# Patient Record
Sex: FEMALE | Race: WHITE | HISPANIC OR LATINO | Employment: PART TIME | URBAN - METROPOLITAN AREA
[De-identification: names, ages, dates, MRNs, and addresses within clinical notes are randomized per-mention and may not be internally consistent; named-entity substitution may affect disease eponyms.]

---
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
## 2018-01-26 DIAGNOSIS — G43.909 MIGRAINE WITHOUT STATUS MIGRAINOSUS, NOT INTRACTABLE, UNSPECIFIED MIGRAINE TYPE: ICD-10-CM

## 2018-01-26 NOTE — TELEPHONE ENCOUNTER
"Requested Prescriptions   Pending Prescriptions Disp Refills     amitriptyline (ELAVIL) 25 MG tablet [Pharmacy Med Name: AMITRIPTYLINE HCL 25 MG TAB] 90 tablet 1    Last Written Prescription Date:  06/09/2017  Last Fill Quantity: 90 TABLET,  # refills: 1   Last Office Visit with 06/09/2017:     Future Office Visit:      Sig: TAKE 1 TABLET (25 MG) BY MOUTH AT BEDTIME    Tricyclic Antidepressants Protocol Failed    1/26/2018  1:13 AM       Failed - Blood pressure under 140/90    BP Readings from Last 3 Encounters:   12/29/17 155/88   06/09/17 120/80   05/15/17 106/70                Passed - Recent (12 mo) or future (30 d) visit with authorizing provider's specialty     Patient had office visit in the last year or has a visit in the next 30 days with authorizing provider.  See \"Patient Info\" tab in inbasket, or \"Choose Columns\" in Meds & Orders section of the refill encounter.            Passed - Patient is age 18 or older       Passed - No active pregnancy on record       Passed - No positive pregnancy test in past 12 months          Americo RUEDAT  "

## 2018-01-29 NOTE — TELEPHONE ENCOUNTER
Follow up visit in 6 months, sooner as needed., one month sent with appointment message  Prescription approved per G Refill Protocol.  Pia Santiago RN, BSN

## 2018-02-02 ENCOUNTER — APPOINTMENT (OUTPATIENT)
Dept: GENERAL RADIOLOGY | Facility: CLINIC | Age: 18
End: 2018-02-02
Attending: EMERGENCY MEDICINE
Payer: COMMERCIAL

## 2018-02-02 ENCOUNTER — HOSPITAL ENCOUNTER (EMERGENCY)
Facility: CLINIC | Age: 18
Discharge: HOME | End: 2018-02-02
Attending: EMERGENCY MEDICINE | Admitting: EMERGENCY MEDICINE
Payer: COMMERCIAL

## 2018-02-02 ENCOUNTER — APPOINTMENT (OUTPATIENT)
Dept: ULTRASOUND IMAGING | Facility: CLINIC | Age: 18
End: 2018-02-02
Attending: EMERGENCY MEDICINE
Payer: COMMERCIAL

## 2018-02-02 VITALS
BODY MASS INDEX: 34.36 KG/M2 | RESPIRATION RATE: 20 BRPM | WEIGHT: 219.36 LBS | TEMPERATURE: 97.2 F | SYSTOLIC BLOOD PRESSURE: 134 MMHG | OXYGEN SATURATION: 100 % | DIASTOLIC BLOOD PRESSURE: 86 MMHG

## 2018-02-02 DIAGNOSIS — R07.89 CHEST WALL PAIN: ICD-10-CM

## 2018-02-02 DIAGNOSIS — R10.11 ABDOMINAL PAIN, RIGHT UPPER QUADRANT: ICD-10-CM

## 2018-02-02 LAB
ANION GAP SERPL CALCULATED.3IONS-SCNC: 6 MMOL/L (ref 3–14)
BUN SERPL-MCNC: 14 MG/DL (ref 7–30)
CALCIUM SERPL-MCNC: 8.8 MG/DL (ref 8.5–10.1)
CHLORIDE SERPL-SCNC: 107 MMOL/L (ref 96–110)
CO2 SERPL-SCNC: 26 MMOL/L (ref 20–32)
CREAT SERPL-MCNC: 0.62 MG/DL (ref 0.5–1)
ERYTHROCYTE [DISTWIDTH] IN BLOOD BY AUTOMATED COUNT: 12.4 % (ref 10–15)
GFR SERPL CREATININE-BSD FRML MDRD: >90 ML/MIN/1.7M2
GLUCOSE SERPL-MCNC: 113 MG/DL (ref 70–99)
HCG UR QL: NEGATIVE
HCT VFR BLD AUTO: 43.7 % (ref 35–47)
HGB BLD-MCNC: 14.8 G/DL (ref 11.7–15.7)
MCH RBC QN AUTO: 29.2 PG (ref 26.5–33)
MCHC RBC AUTO-ENTMCNC: 33.9 G/DL (ref 31.5–36.5)
MCV RBC AUTO: 86 FL (ref 78–100)
PLATELET # BLD AUTO: 291 10E9/L (ref 150–450)
POTASSIUM SERPL-SCNC: 3.6 MMOL/L (ref 3.4–5.3)
RBC # BLD AUTO: 5.06 10E12/L (ref 3.8–5.2)
SODIUM SERPL-SCNC: 139 MMOL/L (ref 133–144)
WBC # BLD AUTO: 8.8 10E9/L (ref 4–11)

## 2018-02-02 PROCEDURE — 93976 VASCULAR STUDY: CPT | Mod: XS

## 2018-02-02 PROCEDURE — 71046 X-RAY EXAM CHEST 2 VIEWS: CPT

## 2018-02-02 PROCEDURE — 85027 COMPLETE CBC AUTOMATED: CPT | Performed by: EMERGENCY MEDICINE

## 2018-02-02 PROCEDURE — 80048 BASIC METABOLIC PNL TOTAL CA: CPT | Performed by: EMERGENCY MEDICINE

## 2018-02-02 PROCEDURE — 81025 URINE PREGNANCY TEST: CPT | Performed by: EMERGENCY MEDICINE

## 2018-02-02 PROCEDURE — 99285 EMERGENCY DEPT VISIT HI MDM: CPT | Mod: 25

## 2018-02-02 ASSESSMENT — ENCOUNTER SYMPTOMS
HEMATURIA: 0
FEVER: 0
NUMBNESS: 1
ABDOMINAL PAIN: 1
DYSURIA: 0
COUGH: 1
WEAKNESS: 1
FREQUENCY: 0

## 2018-02-02 NOTE — ED NOTES
Patient alert and oriented times 3 .  Abc intact cough for 2 days now pain in right upper abdomen started at 1530. Ate at 2300 no change in pain

## 2018-02-02 NOTE — ED PROVIDER NOTES
History     Chief Complaint:  Abdominal Pain    HPI   Kailey Argueta is a 17 year old female smoker who presents to the ED for evaluation of abdominal pain. The patient reports that she has had a cough for the past 2 days. This afternoon at 1530, she also developed a sharp, intermittent abdominal pain localized to her RUQ under her rib cage, that she felt was initiated by her cough.  She has had 2 episodes of this pain, the second around 2300, both 10 minutes in duration. She also ate around 2300, and notes no change in her pain at that time. She denies any fevers, dysuria, hematuria, or frequency. Additionally, she denies any history of abdominal surgery. She notes that she had a similar pain in the past on the other side, and it was an ovarian cyst. She has taken 2 ibuprofen for the pain.  No traumatic injuries.      PE/DVT Risk Factors:  The patient denies any personal or familial history of PE, DVT, or clotting disorder. The patient reports no recent travel, surgery, or other immobilizations. She denies any birth control use.    Allergies:  NKDA    Medications:    Elavil  Lamotrigine  Voltaren  Vyvanse    Past Medical History:    ADHD  Migraines  Panic attack    Past Surgical History:    The patient does not have any pertinent past surgical history.    Family History:    No past pertinent family history.    Social History:  Marital Status:  Single [1]  Current Smoker  Negative for alcohol use.    Review of Systems   Constitutional: Negative for fever.   Respiratory: Positive for cough.    Gastrointestinal: Positive for abdominal pain.   Genitourinary: Negative for dysuria, frequency and hematuria.   Neurological: Positive for weakness and numbness.   All other systems reviewed and are negative.    Physical Exam     Patient Vitals for the past 24 hrs:   BP Temp Temp src Heart Rate Resp SpO2 Weight   02/02/18 0026 140/80 97.2  F (36.2  C) Temporal 96 20 100 % 99.5 kg (219 lb 5.7 oz)     Physical Exam  General:                         Well-nourished                        Speaking in full sentences                        Well appearing  Eyes:                        Conjunctiva without injection or scleral icterus                        PERRL  ENT:                        Moist mucous membranes                        Posterior oropharynx clear without erythema or exudate                        Nares patent                        Pinnae normal  Neck:                        Full ROM                        No stiffness appreciated  Resp:                        Lungs CTAB                        No crackles, wheezing or audible rubs                        Good air movement  CV:                                        Normal rate, regular rhythm                        S1 and S2 present                        No murmur, gallop or rub                        Mild tenderness to deep palpation over lower right thoracic margin  GI:                        BS present                        Abdomen soft without distention                        No focal RLQ pain or LLQ pain                        No guarding or rebound tenderness  Skin:                        Warm, dry, well perfused                        No rashes or open wounds on exposed skin  MSK:                        Moves all extremities                        No focal deformities or swelling  Neuro:                        Alert                        Answers questions appropriately                        Moves all extremities equally                        Gait stable  Psych:                        Normal affect, normal mood     PERC Rule for risk stratifying PE to low risk (calculator)  Background  Risk stratifies patients to low risk of PE if all 8 criteria are present including age <50, heart rate <100, O2 Sat >94%, no unilateral leg edema, no hemoptysis, no recent surgery or trauma, no prior VTE, and no hormone use.  Data  17 year old  has CARDIOVASCULAR SCREENING; LDL GOAL LESS  THAN 130; Panic attack; and Migraine without status migrainosus, not intractable, unspecified migraine type on her problem list.  SpO2: 100 %  Criteria                        Of  8 possible items (all criteria must be present):  Age <50 years  Heart rate <100 bpm  Oxygen Saturation >94%  No unilateral leg swelling  No hemoptysis  No surgery or trauma within 4 weeks  No prior DVT or PE  No hormone use (oral, transdermal and intravaginal estrogens)  Interpretation  All eight criteria are met AND low clinical PE suspicion: No further evaluation for PE required    Emergency Department Course     Imaging:  Radiographic findings were communicated with the patient who voiced understanding of the findings.  XR Chest PA & LAT:   No acute abnormality, as per radiology.     US Pelvic, Complete w Transvaginal & Abd/Pel Duplex Limited:  Normal pelvis, as per radiology.     Laboratory:  CBC: WBC: 8.8, HGB: 14.8, PLT: 291  BMP: Glucose 113 (H), o/w WNL (Creatinine: 0.62)    HCG: negative    Emergency Department Course:  Nursing notes and vitals reviewed. (0037) I performed an exam of the patient as documented above.     IV inserted. Blood drawn. This was sent to the lab for further testing, results above.    The patient provided a urine sample here in the emergency department. This was sent for laboratory testing, findings above.     The patient was sent for a Pelvic US & Chest XR while in the emergency department, findings above.     (1007) I rechecked the patient and discussed the results of her workup thus far.     Findings and plan explained to the Patient. Patient discharged home with instructions regarding supportive care, medications, and reasons to return. The importance of close follow-up was reviewed.    I personally reviewed the laboratory results with the Patient and answered all related questions prior to discharge.     Impression & Plan      Medical Decision Making:  Kailey Argueta is a previously healthy 17-year-old  female presenting to the emergency department for evaluation of right-sided abdominal pain.  VS on presentation reveal elevated BP though otherwise are unremarkable.  Differential diagnosis includes musculoskeletal pain, pneumothorax, PE, referred biliary process, zoster, ectopic pregnancy, urinary tract infection, referred ovarian process, acute appendicitis, among others.  Workup included imaging and laboratory studies.  Symptoms that are present are most suspicious for musculoskeletal pain.  She noted a distinct onset of symptoms when she had a coughing spell.  She does have reproducible tenderness to palpation to the right lower costal margin.  Chest x-ray is negative for pneumothorax, pneumonia, or hemothorax. PE unlikely as patient is low risk by Wells Criteria and PERC negative. Abdominal exam is soft without localizing tenderness.  Patient acknowledges similar symptoms with a prior ovarian cyst.  Pelvic ultrasound does not reveal evidence of cyst or mass.  Blood flow is documented bilaterally and in the absence of lead point, I feel ovarian torsion to be unlikely.  Patient is clinically well-appearing, and denies need for any analgesia.  She notes no associated urinary symptoms.  There are no overlying skin changes consistent with zoster.  Given the transient nature of symptoms, absence of localizing tenderness, and reassuring abdominal exam, acute appendicitis is felt to be unlikely and risks of further advanced imaging outweigh benefits.  Pregnancy test is negative.  Clinical impression discussed with patient.  At this point I feel she can be safely discharged home with close outpatient follow-up.  Recommended Tylenol or ibuprofen as needed for pain or discomfort.  Follow-up with PCP in 2-3 days if symptoms persist.  Return to ER with severe pain, vomiting, fevers, inability to tolerate p.o., or any other concerns.  All questions are answered prior to discharge.     Diagnosis:    ICD-10-CM   1. Abdominal  pain, right upper quadrant R10.11   2. Chest wall pain R07.89     Disposition:  discharged to home    Scribe Disclosure:  I, Zoila Tom, am serving as a scribe on 2/2/2018 at 12:33 AM to personally document services performed by Tenzin Roberts MD based on my observations and the provider's statements to me.     Zoila Santos  2/2/2018   [facility] EMERGENCY DEPARTMENT       Tenzin Roberts MD  02/02/18 2190

## 2018-02-02 NOTE — ED AVS SNAPSHOT
[facility] Emergency Department    [address]    [facility] [address]    Phone:  [phone]    Fax:  [phone]                                       Kailey Argueta   MRN: [id number]    Department:  [facility] Emergency Department   Date of Visit:  2/2/2018           After Visit Summary Signature Page     I have received my discharge instructions, and my questions have been answered. I have discussed any challenges I see with this plan with the nurse or doctor.    ..........................................................................................................................................  Patient/Patient Representative Signature      ..........................................................................................................................................  Patient Representative Print Name and Relationship to Patient    ..................................................               ................................................  Date                                            Time    ..........................................................................................................................................  Reviewed by Signature/Title    ...................................................              ..............................................  Date                                                            Time

## 2018-02-02 NOTE — ED AVS SNAPSHOT
[facility] Emergency Department    [address]    [facility] [address]    Phone:  [phone]    Fax:  [phone]                                       Kailey Argueta   MRN: [id number]    Department:  [facility] Emergency Department   Date of Visit:  2/2/2018           Patient Information     Date Of Birth          2000        Your diagnoses for this visit were:     Abdominal pain, right upper quadrant     Chest wall pain        You were seen by Tenzin Roberts MD.      Follow-up Information     Follow up with [facility]. Schedule an appointment as soon as possible for a visit in 2 days.    Contact information:    [address]  [facility] [address] [phone]          Follow up with [facility] Emergency Department.    Specialty:  EMERGENCY MEDICINE    Why:  If symptoms worsen    Contact information:    [address]  [facility] [id number] [phone]        Discharge Instructions       Discharge Instructions  Abdominal Pain    Abdominal pain (belly pain) can be caused by many things. Your evaluation today does not show the exact cause for your pain. Your provider today has decided that it is unlikely your pain is due to a life threatening problem, or a problem requiring surgery or hospital admission. Sometimes those problems cannot be found right away, so it is very important that you follow up as directed.  Sometimes only the changes which occur over time allow the cause of your pain to be found.    Generally, every Emergency Department visit should have a follow-up clinic visit with either a primary or a specialty clinic/provider. Please follow-up as instructed by your emergency provider today. With abdominal pain, we often recommend very close follow-up, such as the following day.    ADULTS:  Return to the Emergency Department right away if:      You get an oral temperature above 102oF or as directed by  your provider.    You have blood in your stools. This may be bright red or appear as black, tarry stools.      You keep vomiting (throwing up) or cannot drink liquids.    You see blood when you vomit.     You cannot have a bowel movement or you cannot pass gas.    Your stomach gets bloated or bigger.    Your skin or the whites of your eyes look yellow.    You faint.    You have bloody, frequent or painful urination (peeing).    You have new symptoms or anything that worries you.    CHILDREN:  Return to the Emergency Department right away if your child has any of the above-listed symptoms or the following:      Pushes your hand away or screams/cries when his/her belly is touched.    You notice your child is very fussy or weak.    Your child is very tired and is too tired to eat or drink.    Your child is dehydrated.  Signs of dehydration can be:  o Significant change in the amount of wet diapers/urine.  o Your infant or child starts to have dry mouth and lips, or no saliva (spit) or tears.    PREGNANT WOMEN:  Return to the Emergency Department right away if you have any of the above-listed symptoms or the following:      You have bleeding, leaking fluid or passing tissue from the vagina.    You have worse pain or cramping, or pain in your shoulder or back.    You have vomiting that will not stop.    You have a temperature of 100oF or more.    Your baby is not moving as much as usual.    You faint.    You get a bad headache with or without eye problems and abdominal pain.    You have a seizure.    You have unusual discharge from your vagina and abdominal pain.    Abdominal pain is pretty common during pregnancy.  Your pain may or may not be related to your pregnancy. You should follow-up closely with your OB provider so they can evaluate you and your baby.  Until you follow-up with your regular provider, do the following:       Avoid sex and do not put anything in your vagina.    Drink clear fluids.    Only take  "medications approved by your provider.    MORE INFORMATION:    Appendicitis:  A possible cause of abdominal pain in any person who still has their appendix is acute appendicitis. Appendicitis is often hard to diagnose.  Testing does not always rule out early appendicitis or other causes of abdominal pain. Close follow-up with your provider and re-evaluations may be needed to figure out the reason for your abdominal pain.    Follow-up:  It is very important that you make an appointment with your clinic and go to the appointment.  If you do not follow-up with your primary provider, it may result in missing an important development which could result in permanent injury or disability and/or lasting pain.  If there is any problem keeping your appointment, call your provider or return to the Emergency Department.    Medications:  Take your medications as directed by your provider today.  Before using over-the-counter medications, ask your provider and make sure to take the medications as directed.  If you have any questions about medications, ask your provider.    Diet:  Resume your normal diet as much as possible, but do not eat fried, fatty or spicy foods while you have pain.  Do not drink alcohol or have caffeine.  Do not smoke tobacco.    Probiotics: If you have been given an antibiotic, you may want to also take a probiotic pill or eat yogurt with live cultures. Probiotics have \"good bacteria\" to help your intestines stay healthy. Studies have shown that probiotics help prevent diarrhea (loose stools) and other intestine problems (including C. diff infection) when you take antibiotics. You can buy these without a prescription in the pharmacy section of the store.     If you were given a prescription for medicine here today, be sure to read all of the information (including the package insert) that comes with your prescription.  This will include important information about the medicine, its side effects, and any " warnings that you need to know about.  The pharmacist who fills the prescription can provide more information and answer questions you may have about the medicine.  If you have questions or concerns that the pharmacist cannot address, please call or return to the Emergency Department.       Remember that you can always come back to the Emergency Department if you are not able to see your regular provider in the amount of time listed above, if you get any new symptoms, or if there is anything that worries you.      24 Hour Appointment Hotline       To make an appointment at any [facility], call [phone] ([phone]). If you don't have a family doctor or clinic, we will help you find one. [facility] are conveniently located to serve the needs of you and your family.             Review of your medicines      Our records show that you are taking the medicines listed below. If these are incorrect, please call your family doctor or clinic.        Dose / Directions Last dose taken    amitriptyline 25 MG tablet   Commonly known as:  ELAVIL   Quantity:  30 tablet        TAKE 1 TABLET (25 MG) BY MOUTH AT BEDTIME   Refills:  0        diclofenac 1 % Gel topical gel   Commonly known as:  VOLTAREN   Quantity:  100 g        Apply 4 grams to knees or 2 grams to hands four times daily using enclosed dosing card.   Refills:  1        LAMICTAL 100 MG tablet   Quantity:  60 tablet   Generic drug:  lamoTRIgine        2 tab QD   Refills:  0        VYVANSE 70 MG capsule   Dose:  70 mg   Quantity:  30 capsule   Generic drug:  lisdexamfetamine        Take 1 capsule (70 mg) by mouth every morning   Refills:  0                Procedures and tests performed during your visit     Basic metabolic panel (BMP)    CBC (platelets, no diff)    Chest XR,  PA & LAT    HCG qualitative urine    US Pelvic Complete w Transvaginal & Abd/Pel Duplex Limited      Orders Needing Specimen Collection     None      Pending Results     Date and  Time Order Name Status Description    2/2/2018 0045 US Pelvic Complete w Transvaginal & Abd/Pel Duplex Limited Preliminary     2/2/2018 0045 Chest XR,  PA & LAT Preliminary             Pending Culture Results     No orders found from 1/31/2018 to 2/3/2018.            Pending Results Instructions     If you had any lab results that were not finalized at the time of your Discharge, you can call the ED Lab Result RN at [phone]. You will be contacted by this team for any positive Lab results or changes in treatment. The nurses are available 7 days a week from 10A to 6:30P.  You can leave a message 24 hours per day and they will return your call.        Test Results From Your Hospital Stay        2/2/2018  1:34 AM      Component Results     Component Value Ref Range & Units Status    HCG Qual Urine Negative NEG^Negative Final    This test is for screening purposes.  Results should be interpreted along with   the clinical picture.  Confirmation testing is available if warranted by   ordering [id number], HCG Quantitative Pregnancy.           2/2/2018  1:02 AM      Component Results     Component Value Ref Range & Units Status    WBC 8.8 4.0 - 11.0 10e9/L Final    RBC Count 5.06 3.8 - 5.2 10e12/L Final    Hemoglobin 14.8 11.7 - 15.7 g/dL Final    Hematocrit 43.7 35.0 - 47.0 % Final    MCV 86 78 - 100 fl Final    MCH 29.2 26.5 - 33.0 pg Final    MCHC 33.9 31.5 - 36.5 g/dL Final    RDW 12.4 10.0 - 15.0 % Final    Platelet Count 291 150 - 450 10e9/L Final         2/2/2018  1:59 AM      Component Results     Component Value Ref Range & Units Status    Sodium 139 133 - 144 mmol/L Final    Potassium 3.6 3.4 - 5.3 mmol/L Final    Chloride 107 96 - 110 mmol/L Final    Carbon Dioxide 26 20 - 32 mmol/L Final    Anion Gap 6 3 - 14 mmol/L Final    Glucose 113 (H) 70 - 99 mg/dL Final    Urea Nitrogen 14 7 - 30 mg/dL Final    Creatinine 0.62 0.50 - 1.00 mg/dL Final    GFR Estimate >90 >60 mL/min/1.7m2 Final    Non African American GFR  Calc    GFR Estimate If Black >90 >60 mL/min/1.7m2 Final    African American GFR Calc    Calcium 8.8 8.5 - 10.1 mg/dL Final         2/2/2018  2:29 AM      Narrative     XR CHEST 2 VIEWS   2/2/2018 2:22 AM     HISTORY: Cough.     COMPARISON: 12/29/2017.    FINDINGS: The heart size is normal. The lungs are clear. No  pneumothorax or pleural effusion.        Impression     IMPRESSION: No acute abnormality.         2/2/2018  2:26 AM      Narrative     PELVIC ULTRASOUND WITH ENDOVAGINAL TRANSDUCER    2/2/2018 2:18 AM     HISTORY: Lower right pain, history of ovarian cysts, states this feels  similar.    TECHNIQUE:  Endovaginal sonography was added to the transabdominal  exam to better evaluate the uterus and ovaries.    COMPARISON: None.    FINDINGS: The uterus is normal in size and position measuring 6.9 x  3.6 x 4.1 cm. The endometrium is normal in thickness at 1.0 cm. The  ovaries are normal in size and appearance bilaterally. Color Doppler  and Doppler waveform analysis of the ovary shows blood flow  bilaterally. No adnexal mass. There is a trace amount of free pelvic  fluid, within normal limits.        Impression     IMPRESSION: Normal pelvis.                  Clinical Quality Measure: Blood Pressure Screening     Your blood pressure was checked while you were in the emergency department today. The last reading we obtained was  BP: 140/80 . Please read the guidelines below about what these numbers mean and what you should do about them.  If your systolic blood pressure (the top number) is less than 120 and your diastolic blood pressure (the bottom number) is less than 80, then your blood pressure is normal. There is nothing more that you need to do about it.  If your systolic blood pressure (the top number) is 120-139 or your diastolic blood pressure (the bottom number) is 80-89, your blood pressure may be higher than it should be. You should have your blood pressure rechecked within a year by a primary care  provider.  If your systolic blood pressure (the top number) is 140 or greater or your diastolic blood pressure (the bottom number) is 90 or greater, you may have high blood pressure. High blood pressure is treatable, but if left untreated over time it can put you at risk for heart attack, stroke, or kidney failure. You should have your blood pressure rechecked by a primary care provider within the next 4 weeks.  If your provider in the emergency department today gave you specific instructions to follow-up with your doctor or provider even sooner than that, you should follow that instruction and not wait for up to 4 weeks for your follow-up visit.        Thank you for choosing [facility]       Thank you for choosing [facility] for your care. Our goal is always to provide you with excellent care. Hearing back from our patients is one way we can continue to improve our services. Please take a few minutes to complete the written survey that you may receive in the mail after you visit with us. Thank you!        2smshart Information     [email or web address] gives you secure access to your electronic health record. If you see a primary care provider, you can also send messages to your care team and make appointments. If you have questions, please call your primary care clinic.  If you do not have a primary care provider, please call [phone] and they will assist you.        Care EveryWhere ID     This is your Care EveryWhere ID. This could be used by other organizations to access your [facility] medical records  [id number]        Equal Access to Services     MEHDI BLACKBURN : Katherin Cota, wapedro forrest, qaybta coraalmary calvillo. So [facility] [phone].    ATENCIÓN: Si habla español, tiene a quevedo disposición servicios gratuitos de asistencia lingüística. Llame al [phone].    We comply with applicable federal civil rights laws and [place] laws. We do not discriminate on the  basis of race, color, national origin, age, disability, sex, sexual orientation, or gender identity.            After Visit Summary       This is your record. Keep this with you and show to your community pharmacist(s) and doctor(s) at your next visit.

## 2018-02-06 ENCOUNTER — OFFICE VISIT (OUTPATIENT)
Dept: FAMILY MEDICINE | Facility: CLINIC | Age: 18
End: 2018-02-06
Payer: COMMERCIAL

## 2018-02-06 VITALS
TEMPERATURE: 99.1 F | SYSTOLIC BLOOD PRESSURE: 122 MMHG | BODY MASS INDEX: 34.06 KG/M2 | DIASTOLIC BLOOD PRESSURE: 81 MMHG | HEIGHT: 67 IN | RESPIRATION RATE: 20 BRPM | WEIGHT: 217 LBS | OXYGEN SATURATION: 97 % | HEART RATE: 86 BPM

## 2018-02-06 DIAGNOSIS — R10.31 ABDOMINAL PAIN, RIGHT LOWER QUADRANT: Primary | ICD-10-CM

## 2018-02-06 DIAGNOSIS — Z71.6 ENCOUNTER FOR SMOKING CESSATION COUNSELING: ICD-10-CM

## 2018-02-06 PROCEDURE — 99214 OFFICE O/P EST MOD 30 MIN: CPT | Performed by: FAMILY MEDICINE

## 2018-02-06 NOTE — MR AVS SNAPSHOT
"              After Visit Summary   2/6/2018    Kailey Argueta    MRN: [id number]           Patient Information     Date Of Birth          2000        Visit Information        Provider Department      2/6/2018 1:15 PM Layne Elizondo MD [facility]        Today's Diagnoses     Abdominal pain, right lower quadrant    -  1    Encounter for smoking cessation counseling           Follow-ups after your visit        Who to contact     If you have questions or need follow up information about today's clinic visit or your schedule please contact [facility] directly at [phone].  Normal or non-critical lab and imaging results will be communicated to you by [employer], letter or phone within 4 business days after the clinic has received the results. If you do not hear from us within 7 days, please contact the clinic through [employer] or phone. If you have a critical or abnormal lab result, we will notify you by phone as soon as possible.  Submit refill requests through [employer] or call your pharmacy and they will forward the refill request to us. Please allow 3 business days for your refill to be completed.          Additional Information About Your Visit        MyChart Information     [employer] gives you secure access to your electronic health record. If you see a primary care provider, you can also send messages to your care team and make appointments. If you have questions, please call your primary care clinic.  If you do not have a primary care provider, please call [phone] and they will assist you.        Care EveryWhere ID     This is your Care EveryWhere ID. This could be used by other organizations to access your [facility] medical records  [id number]        Your Vitals Were     Pulse Temperature Respirations Height Last Period Pulse Oximetry    86 99.1  F (37.3  C) (Oral) 20 5' 7\" (1.702 m) 01/17/2018 97%    BMI (Body Mass Index)                   33.99 kg/m2            Blood " Pressure from Last 3 Encounters:   02/06/18 122/81   02/02/18 134/86   12/29/17 155/88    Weight from Last 3 Encounters:   02/06/18 217 lb (98.4 kg) (99 %)*   02/02/18 219 lb 5.7 oz (99.5 kg) (99 %)*   12/29/17 218 lb 4.1 oz (99 kg) (99 %)*     * Growth percentiles are based on [facility] 2-20 Years data.              Today, you had the following     No orders found for display       Primary Care Provider Office Phone # Fax #    [facility] [phone] [address] [facility] [address]        Equal Access to Services     MEHDI BLACKBURN : Katherin Cota, wapedro forrest, qaybta kaalmada adelucie, mary nielsen. So [facility] [phone].    ATENCIÓN: Si habla español, tiene a quevedo disposición servicios gratuitos de asistencia lingüística. Llame al [phone].    We comply with applicable federal civil rights laws and [place] laws. We do not discriminate on the basis of race, color, national origin, age, disability, sex, sexual orientation, or gender identity.            Thank you!     Thank you for choosing [facility]  for your care. Our goal is always to provide you with excellent care. Hearing back from our patients is one way we can continue to improve our services. Please take a few minutes to complete the written survey that you may receive in the mail after your visit with us. Thank you!             Your Updated Medication List - Protect others around you: Learn how to safely use, store and throw away your medicines at www.disposemymeds.org.          This list is accurate as of 2/6/18  1:33 PM.  Always use your most recent med list.                   Brand Name Dispense Instructions for use Diagnosis    amitriptyline 25 MG tablet    ELAVIL    30 tablet    TAKE 1 TABLET (25 MG) BY MOUTH AT BEDTIME    Migraine without status migrainosus, not intractable, unspecified migraine type       diclofenac 1 % Gel topical gel     VOLTAREN    100 g    Apply 4 grams to knees or 2 grams to hands four times daily using enclosed dosing card.    Right foot pain, Tendinitis of right foot, Pes cavus, congenital       LAMICTAL 100 MG tablet   Generic drug:  lamoTRIgine     60 tablet    2 tab QD        VYVANSE 70 MG capsule   Generic drug:  lisdexamfetamine     30 capsule    Take 1 capsule (70 mg) by mouth every morning

## 2018-02-06 NOTE — PROGRESS NOTES
SUBJECTIVE:   Kailey Argueta is a 17 year old female who presents to clinic today for the following health issues:      ED/UC Followup:    Facility:  [facility]  Date of visit: 2/2/18  Reason for visit: rib pain  Current Status: improvement     Since going to [facility], pt went home, and went to bed, when woke up, the pain has improved, significantly, then went away completely.    smoking      Duration: started December 2017    Description (location/character/radiation): smokes about 2 a day.    Accompanying signs and symptoms: no couging.    History (similar episodes/previous evaluation): None    Precipitating or alleviating factors: None    Therapies tried and outcome: switched over to electronic cigarette        Problem list and histories reviewed & adjusted, as indicated.  Additional history: as documented    Patient Active Problem List   Diagnosis     CARDIOVASCULAR SCREENING; LDL GOAL LESS THAN 130     Panic attack     Migraine without status migrainosus, not intractable, unspecified migraine type     History reviewed. No pertinent surgical history.    Social History   Substance Use Topics     Smoking status: Current Some Day Smoker     Smokeless tobacco: Never Used     Alcohol use No     Family History   Problem Relation Age of Onset     DIABETES Mother      Hypertension Mother          Current Outpatient Prescriptions   Medication Sig Dispense Refill     amitriptyline (ELAVIL) 25 MG tablet TAKE 1 TABLET (25 MG) BY MOUTH AT BEDTIME 30 tablet 0     diclofenac (VOLTAREN) 1 % GEL topical gel Apply 4 grams to knees or 2 grams to hands four times daily using enclosed dosing card. 100 g 1     lamoTRIgine (LAMICTAL) 100 MG tablet 2 tab QD 60 tablet      lisdexamfetamine (VYVANSE) 70 MG capsule Take 1 capsule (70 mg) by mouth every morning 30 capsule 0       Reviewed and updated as needed this visit by clinical staff  Tobacco  Allergies  Meds  Med Hx  Surg Hx  Fam Hx  Soc Hx      Reviewed and updated as needed this visit by  "Provider         ROS:  C: NEGATIVE for fever, chills, change in weight  R: NEGATIVE for significant cough or SOB  CV: NEGATIVE for chest pain, palpitations or peripheral edema    OBJECTIVE:     /81 (BP Location: Right arm, Patient Position: Chair, Cuff Size: Adult Large)  Pulse 86  Temp 99.1  F (37.3  C) (Oral)  Resp 20  Ht 5' 7\" (1.702 m)  Wt 217 lb (98.4 kg)  LMP 01/17/2018  SpO2 97%  BMI 33.99 kg/m2  Body mass index is 33.99 kg/(m^2).  GENERAL: healthy, alert and no distress  RESP: lungs clear to auscultation - no rales, rhonchi or wheezes  CV: regular rate and rhythm, normal S1 S2, no S3 or S4, no murmur, click or rub, no peripheral edema and peripheral pulses strong  ABDOMEN: soft, nontender, no hepatosplenomegaly, no masses and bowel sounds normal          ASSESSMENT/PLAN:             1. Abdominal pain, right lower quadrant  Resolved.    2. Encounter for smoking cessation counseling  Talked about smoking cessation. Advised against E cigarette.          Layne Elizondo MD  [facility]    "

## 2018-02-16 ENCOUNTER — OFFICE VISIT (OUTPATIENT)
Dept: FAMILY MEDICINE | Facility: CLINIC | Age: 18
End: 2018-02-16
Payer: COMMERCIAL

## 2018-02-16 VITALS
WEIGHT: 217.8 LBS | TEMPERATURE: 97.6 F | HEIGHT: 66 IN | BODY MASS INDEX: 35 KG/M2 | RESPIRATION RATE: 20 BRPM | SYSTOLIC BLOOD PRESSURE: 128 MMHG | DIASTOLIC BLOOD PRESSURE: 84 MMHG | OXYGEN SATURATION: 98 % | HEART RATE: 76 BPM

## 2018-02-16 DIAGNOSIS — R07.89 ATYPICAL CHEST PAIN: Primary | ICD-10-CM

## 2018-02-16 PROCEDURE — 99213 OFFICE O/P EST LOW 20 MIN: CPT | Performed by: FAMILY MEDICINE

## 2018-02-16 ASSESSMENT — ENCOUNTER SYMPTOMS
DEPRESSION: 1
WHEEZING: 0
PALPITATIONS: 0
NAUSEA: 1
SHORTNESS OF BREATH: 1
NERVOUS/ANXIOUS: 0
COUGH: 1
VOMITING: 0

## 2018-02-16 NOTE — MR AVS SNAPSHOT
After Visit Summary   2/16/2018    Kailey Argueta    MRN: [id number]           Patient Information     Date Of Birth          2000        Visit Information        Provider Department      2/16/2018 2:40 PM Didier Dennison MD [facility]        Today's Diagnoses     Atypical chest pain    -  1      Care Instructions    Prilosec (omeprazole) 2 x 20mg tablets daily for two weeks.          Follow-ups after your visit        Follow-up notes from your care team     Return in about 2 weeks (around 3/2/2018).      Who to contact     If you have questions or need follow up information about today's clinic visit or your schedule please contact [facility] directly at [phone].  Normal or non-critical lab and imaging results will be communicated to you by MiiPharoshart, letter or phone within 4 business days after the clinic has received the results. If you do not hear from us within 7 days, please contact the clinic through MiiPharoshart or phone. If you have a critical or abnormal lab result, we will notify you by phone as soon as possible.  Submit refill requests through [employer] or call your pharmacy and they will forward the refill request to us. Please allow 3 business days for your refill to be completed.          Additional Information About Your Visit        MyChart Information     [employer] gives you secure access to your electronic health record. If you see a primary care provider, you can also send messages to your care team and make appointments. If you have questions, please call your primary care clinic.  If you do not have a primary care provider, please call [phone] and they will assist you.        Care EveryWhere ID     This is your Care EveryWhere ID. This could be used by other organizations to access your [facility] medical records  [id number]        Your Vitals Were     Pulse Temperature Respirations Height Last Period Pulse Oximetry    76 97.6  F (36.4  " C) (Oral) 20 5' 6\" (1.676 m) 01/17/2018 98%    BMI (Body Mass Index)                   35.15 kg/m2            Blood Pressure from Last 3 Encounters:   02/16/18 128/84   02/06/18 122/81   02/02/18 134/86    Weight from Last 3 Encounters:   02/16/18 217 lb 12.8 oz (98.8 kg)   02/06/18 217 lb (98.4 kg) (99 %)*   02/02/18 219 lb 5.7 oz (99.5 kg) (99 %)*     * Growth percentiles are based on [facility] 2-20 Years data.              Today, you had the following     No orders found for display         Today's Medication Changes          These changes are accurate as of 2/16/18  3:26 PM.  If you have any questions, ask your nurse or doctor.               Stop taking these medicines if you haven't already. Please contact your care team if you have questions.     VYVANSE 70 MG capsule   Generic drug:  lisdexamfetamine   Stopped by:  Didier Dennison MD                    Primary Care Provider Office Phone # Fax #    [facility] [phone] [address] [facility] [address]        Equal Access to Services     MEHDI BLACKBURN : Katherin jonaso Soolga, waaxda luqadaha, qaybta kaalmada adeegyada, mary nielsen. So [facility] [phone].    ATENCIÓN: Si habla español, tiene a quevedo disposición servicios gratuitos de asistencia lingüística. [facility] [phone].    We comply with applicable federal civil rights laws and [place] laws. We do not discriminate on the basis of race, color, national origin, age, disability, sex, sexual orientation, or gender identity.            Thank you!     Thank you for choosing [facility]  for your care. Our goal is always to provide you with excellent care. Hearing back from our patients is one way we can continue to improve our services. Please take a few minutes to complete the written survey that you may receive in the mail after your visit with us. Thank you!             Your Updated Medication List - " Protect others around you: Learn how to safely use, store and throw away your medicines at www.disposemymeds.org.          This list is accurate as of 2/16/18  3:26 PM.  Always use your most recent med list.                   Brand Name Dispense Instructions for use Diagnosis    amitriptyline 25 MG tablet    ELAVIL    30 tablet    TAKE 1 TABLET (25 MG) BY MOUTH AT BEDTIME    Migraine without status migrainosus, not intractable, unspecified migraine type       LAMICTAL 100 MG tablet   Generic drug:  lamoTRIgine     60 tablet    2 tab QD

## 2018-02-16 NOTE — PROGRESS NOTES
HPI     SUBJECTIVE:   Kailey Argueta is a 18 year old female who presents to clinic today for the following health issues:      Chest Pain      Onset: Ongoing for 13 hours.    Description (location/character/radiation/duration): Squeezing pain in the middle of the chest towards the heart. Ongoing pain for 13 hours. Pt states she also gets numbness in her arms and her face.    Intensity:  8/10    Accompanying signs and symptoms:        Shortness of breath: YES       Sweating: no        Nausea/vomitting: YES- Just Nausea       Palpitations: YES       Other (fevers/chills/cough/heartburn/lightheadedness): YES- Chills, Minor Cough, and Lightheadness    History (similar episodes/previous evaluation): None    Precipitating or alleviating factors:       Worse with exertion: YES- Lifting things makes pt feel more lightheaded       Worse with breathing: YES       Related to eating: no        Better with burping: no     Therapies tried and outcome: Rest.     **Pt states she had a 2 5 hour energy at work before the onset of symptoms and is concerned if it has anything to do with her caffeine intake**.  Pain is in central chest.  Does feel lightheaded, like she can't get a full breath.  No palpitations.  Slihgt cough.  No wheezing.  No heart burn, but nausea without vomiting.  No change in bowel habits.  Noting more issues with depression in the last two months.  Has had panic attack in the past, nothing recently and this does not feel the same.  Sleep is difficult - difficulty falling asleep, but no trouble staying asleep.  Some passing thoughts of harming self, but no plans, nothing sustained.  Last appt with psych was over one year ago.    Meds reviewed.      Review of Systems   Respiratory: Positive for cough and shortness of breath. Negative for wheezing.    Cardiovascular: Positive for chest pain. Negative for palpitations.   Gastrointestinal: Positive for nausea. Negative for vomiting.   Psychiatric/Behavioral: Positive  for depression. The patient is not nervous/anxious.          Physical Exam   Constitutional: She is oriented to person, place, and time and well-developed, well-nourished, and in no distress. No distress.   HENT:   Right Ear: Tympanic membrane, external ear and ear canal normal.   Left Ear: Tympanic membrane, external ear and ear canal normal.   Mouth/Throat: Oropharynx is clear and moist. No oropharyngeal exudate.   Eyes: Conjunctivae and EOM are normal.   Cardiovascular: Normal rate, regular rhythm and normal heart sounds.    Pulmonary/Chest: Effort normal and breath sounds normal.   Musculoskeletal: She exhibits no edema.   Lymphadenopathy:     She has no cervical adenopathy.   Neurological: She is alert and oriented to person, place, and time.   Skin: Skin is warm and dry. No rash noted.   Nursing note and vitals reviewed.    (R07.89) Atypical chest pain  (primary encounter diagnosis)  Comment: primary concerns is for anxiety or other mood d/c - advise f/u with psychiatry.  Avoid caffeine, if from this should fade within the next 12-24 hours. Avoid further caffeine.  Trial PPI for reflux.  If sx worsen f/u ASAP  Plan:       RTC in 2w    Didier Dennison MD

## 2018-03-05 DIAGNOSIS — G43.909 MIGRAINE WITHOUT STATUS MIGRAINOSUS, NOT INTRACTABLE, UNSPECIFIED MIGRAINE TYPE: ICD-10-CM

## 2018-03-05 NOTE — TELEPHONE ENCOUNTER
"Requested Prescriptions   Pending Prescriptions Disp Refills     amitriptyline (ELAVIL) 25 MG tablet [Pharmacy Med Name: AMITRIPTYLINE HCL 25 MG TAB] 30 tablet 0    Last Written Prescription Date:  1/29/18  Last Fill Quantity: 30,  # refills: 0   Last Office Visit: 2/16/2018   Future Office Visit:      Sig: TAKE 1 TABLET (25 MG) BY MOUTH AT BEDTIME    Tricyclic Antidepressants Protocol Passed    3/5/2018  1:20 AM       Passed - Blood pressure under 140/90 in past 12 months    BP Readings from Last 3 Encounters:   02/16/18 128/84   02/06/18 122/81   02/02/18 134/86          Passed - Recent (12 mo) or future (30 days) visit within the authorizing provider's specialty     Patient had office visit in the last year or has a visit in the next 30 days with authorizing provider.  See \"Patient Info\" tab in inbasket, or \"Choose Columns\" in Meds & Orders section of the refill encounter.        Passed - Patient is age 18 or older       Passed - No active pregnancy on record       Passed - No positive pregnancy test in past 12 months          "

## 2018-03-06 NOTE — TELEPHONE ENCOUNTER
Pt has been seen 2 times since prescription was sent in January bu migraines hasn't been addressed.  Ok to refill?    Missael Frazier RN, BSN

## 2018-04-22 DIAGNOSIS — G43.909 MIGRAINE WITHOUT STATUS MIGRAINOSUS, NOT INTRACTABLE, UNSPECIFIED MIGRAINE TYPE: ICD-10-CM

## 2018-04-22 NOTE — LETTER
April 24, 2018      Kailey Argueta  [address] [facility] [address]        Dear Kailey,     We recently received a call from your pharmacy requesting a refill of your medication (amitriptyline).    A review of your chart indicates that an appointment is required.  Please contact our office at [phone] to schedule your doctor's appointment for your migraines.     We have authorized one refill of your medication to allow time for you to schedule your appointment.    Taking care of your health is important to us and ongoing visits with your provider are vital to your care.  We look forward to seeing you in the near future.    Sincerely,    Susan Haase, CNP/Pia VIEYRA

## 2018-04-23 NOTE — TELEPHONE ENCOUNTER
"Requested Prescriptions   Pending Prescriptions Disp Refills     amitriptyline (ELAVIL) 25 MG tablet [Pharmacy Med Name: AMITRIPTYLINE HCL 25 MG TAB] 30 tablet 0    Last Written Prescription Date:  3/6/18  Last Fill Quantity: 30,  # refills: 0   Last Office Visit: 2/16/2018   Future Office Visit:      Sig: TAKE 1 TABLET (25 MG) BY MOUTH AT BEDTIME    Tricyclic Agents ( Annual appt and no PHQ9) Passed    4/22/2018  3:00 PM       Passed - Blood Pressure under 140/90 in past 12 mos    BP Readings from Last 3 Encounters:   02/16/18 128/84   02/06/18 122/81   02/02/18 134/86                Passed - Recent (12 mo) or future (30 days) visit within authorizing provider's specialty    Patient had office visit in the last 12 months or has a visit in the next 30 days with authorizing provider or within the authorizing provider's specialty.  See \"Patient Info\" tab in inbasket, or \"Choose Columns\" in Meds & Orders section of the refill encounter.           Passed - Patient is age 18 or older       Passed - Patient is not pregnant       Passed - No positive pregnancy test on record in past 12 mos          "

## 2018-04-24 NOTE — TELEPHONE ENCOUNTER
Follow up visit in 6 months, sooner as needed.  Due for [facility] migraine visit, letter sent  Prescription approved per [facility] Refill Protocol.  Pia Santiago RN, BSN

## 2018-05-23 DIAGNOSIS — G43.909 MIGRAINE WITHOUT STATUS MIGRAINOSUS, NOT INTRACTABLE, UNSPECIFIED MIGRAINE TYPE: ICD-10-CM

## 2018-05-24 NOTE — TELEPHONE ENCOUNTER
"Last Written Prescription Date:  4/24/18  Last Fill Quantity: 30 tablet,  # refills: 0   Last office visit: 6/9/2017 with prescribing provider:  Haase   Future Office Visit:      Notes to Pharmacy: Last refill, inform pt needs appointment with [facility] before next refill due    Requested Prescriptions   Pending Prescriptions Disp Refills     amitriptyline (ELAVIL) 25 MG tablet [Pharmacy Med Name: AMITRIPTYLINE HCL 25 MG TAB] 30 tablet 0     Sig: TAKE 1 TABLET (25 MG) BY MOUTH AT BEDTIME    Tricyclic Agents ( Annual appt and no PHQ9) Passed    5/23/2018 12:40 PM       Passed - Blood Pressure under 140/90 in past 12 mos    BP Readings from Last 3 Encounters:   02/16/18 128/84   02/06/18 122/81   02/02/18 134/86                Passed - Recent (12 mo) or future (30 days) visit within authorizing provider's specialty    Patient had office visit in the last 12 months or has a visit in the next 30 days with authorizing provider or within the authorizing provider's specialty.  See \"Patient Info\" tab in inbasket, or \"Choose Columns\" in Meds & Orders section of the refill encounter.           Passed - Patient is age 18 or older       Passed - Patient is not pregnant       Passed - No positive pregnancy test on record in past 12 mos          "

## 2018-05-25 NOTE — TELEPHONE ENCOUNTER
See last refill, maybe auto fill, sent 2 weeks with another message, letter sent to pt last month  Prescription approved per [facility] Refill Protocol.  Pia Santiago, RN, BSN

## 2018-09-29 ENCOUNTER — OFFICE VISIT (OUTPATIENT)
Dept: URGENT CARE | Facility: URGENT CARE | Age: 18
End: 2018-09-29
Payer: COMMERCIAL

## 2018-09-29 VITALS
SYSTOLIC BLOOD PRESSURE: 120 MMHG | OXYGEN SATURATION: 98 % | HEART RATE: 87 BPM | DIASTOLIC BLOOD PRESSURE: 80 MMHG | TEMPERATURE: 98.4 F

## 2018-09-29 DIAGNOSIS — J01.90 ACUTE SINUSITIS WITH COEXISTING CONDITION REQUIRING PROPHYLACTIC TREATMENT: Primary | ICD-10-CM

## 2018-09-29 PROCEDURE — 99213 OFFICE O/P EST LOW 20 MIN: CPT | Performed by: FAMILY MEDICINE

## 2018-09-29 RX ORDER — FLUTICASONE PROPIONATE 50 MCG
1-2 SPRAY, SUSPENSION (ML) NASAL DAILY
Qty: 1 BOTTLE | Refills: 11 | Status: SHIPPED | OUTPATIENT
Start: 2018-09-29 | End: 2019-04-08

## 2018-09-29 RX ORDER — CEFDINIR 300 MG/1
300 CAPSULE ORAL 2 TIMES DAILY
Qty: 20 CAPSULE | Refills: 0 | Status: SHIPPED | OUTPATIENT
Start: 2018-09-29 | End: 2018-12-17

## 2018-09-29 NOTE — PROGRESS NOTES
SUBJECTIVE: Kailey Argueta is a 18 year old female patient complaining of 2 days sinus congestion, fullness, pain and pressure    OBJECTIVE: The patient appears alert and mild distress.   EARS: External ears normal. Canals clear. TM's normal.  NOSE/SINUS: positive findings: mucosa erythematous and swollen  Sinus palpation: Frontal sinus tender to palpation   THROAT: moderate erythema   NECK:positive findings: moderate anterior cervical nodes   CHEST: Clear    ASSESSMENT: Acute Sinusitis    PLAN: See orders.   In addition, I have suggested that the patient   Push fluids.

## 2018-09-29 NOTE — MR AVS SNAPSHOT
After Visit Summary   9/29/2018    Kailey Argueta    MRN: [id number]           Patient Information     Date Of Birth          2000        Visit Information        Provider Department      9/29/2018 12:30 PM Ankur Briggs MD [facility]        Today's Diagnoses     Acute sinusitis with coexisting condition requiring prophylactic treatment    -  1       Follow-ups after your visit        Who to contact     If you have questions or need follow up information about today's clinic visit or your schedule please contact [facility] directly at [phone].  Normal or non-critical lab and imaging results will be communicated to you by [employer], letter or phone within 4 business days after the clinic has received the results. If you do not hear from us within 7 days, please contact the clinic through [email or web address] or phone. If you have a critical or abnormal lab result, we will notify you by phone as soon as possible.  Submit refill requests through [employer] or call your pharmacy and they will forward the refill request to us. Please allow 3 business days for your refill to be completed.          Additional Information About Your Visit        MyChart Information     [employer] gives you secure access to your electronic health record. If you see a primary care provider, you can also send messages to your care team and make appointments. If you have questions, please call your primary care clinic.  If you do not have a primary care provider, please call [phone] and they will assist you.        Care EveryWhere ID     This is your Care EveryWhere ID. This could be used by other organizations to access your [facility] medical records  [id number]        Your Vitals Were     Pulse Temperature Pulse Oximetry             87 98.4  F (36.9  C) (Oral) 98%          Blood Pressure from Last 3 Encounters:   09/29/18 120/80   02/16/18 128/84   02/06/18 122/81    Weight from Last 3  Encounters:   02/16/18 217 lb 12.8 oz (98.8 kg)   02/06/18 217 lb (98.4 kg) (99 %)*   02/02/18 219 lb 5.7 oz (99.5 kg) (99 %)*     * Growth percentiles are based on [facility] 2-20 Years data.              Today, you had the following     No orders found for display         Today's Medication Changes          These changes are accurate as of 9/29/18  1:04 PM.  If you have any questions, ask your nurse or doctor.               Start taking these medicines.        Dose/Directions    cefdinir 300 MG capsule   Commonly known as:  OMNICEF   Used for:  Acute sinusitis with coexisting condition requiring prophylactic treatment   Started by:  Ankur Briggs MD        Dose:  300 mg   Take 1 capsule (300 mg) by mouth 2 times daily   Quantity:  20 capsule   Refills:  0       fluticasone 50 MCG/ACT spray   Commonly known as:  FLONASE   Used for:  Acute sinusitis with coexisting condition requiring prophylactic treatment   Started by:  Ankur Briggs MD        Dose:  1-2 spray   Spray 1-2 sprays into both nostrils daily   Quantity:  1 Bottle   Refills:  11            Where to get your medicines      These medications were sent to [facility]/pharmacy #[id number] - [facility] [address]  [address] [facility] [address]     Phone:  [phone]     cefdinir 300 MG capsule    fluticasone 50 MCG/ACT spray                Primary Care Provider Office Phone # Fax #    [facility] [phone] [address] [facility] [address]        Equal Access to Services     NEL BLACKBURN AH: Hadii aad ku hadasho Soomaali, waaxda luqadaha, qaybta kaalmada adeegyada, waxay janet nielsen. So [facility] [phone].    ATENCIÓN: Si habla español, tiene a quevedo disposición servicios gratuitos de asistencia lingüística. Llame al [phone].    We comply with applicable federal civil rights laws and [place] laws. We do not discriminate on the basis of race, color, national origin, age,  disability, sex, sexual orientation, or gender identity.            Thank you!     Thank you for choosing [facility]  for your care. Our goal is always to provide you with excellent care. Hearing back from our patients is one way we can continue to improve our services. Please take a few minutes to complete the written survey that you may receive in the mail after your visit with us. Thank you!             Your Updated Medication List - Protect others around you: Learn how to safely use, store and throw away your medicines at www.disposemymeds.org.          This list is accurate as of 9/29/18  1:04 PM.  Always use your most recent med list.                   Brand Name Dispense Instructions for use Diagnosis    amitriptyline 25 MG tablet    ELAVIL    14 tablet    TAKE 1 TABLET (25 MG) BY MOUTH AT BEDTIME    Migraine without status migrainosus, not intractable, unspecified migraine type       cefdinir 300 MG capsule    OMNICEF    20 capsule    Take 1 capsule (300 mg) by mouth 2 times daily    Acute sinusitis with coexisting condition requiring prophylactic treatment       fluticasone 50 MCG/ACT spray    FLONASE    1 Bottle    Spray 1-2 sprays into both nostrils daily    Acute sinusitis with coexisting condition requiring prophylactic treatment       LAMICTAL 100 MG tablet   Generic drug:  lamoTRIgine     60 tablet    2 tab QD

## 2018-10-04 ENCOUNTER — HOSPITAL ENCOUNTER (EMERGENCY)
Facility: CLINIC | Age: 18
Discharge: HOME | End: 2018-10-05
Attending: EMERGENCY MEDICINE | Admitting: EMERGENCY MEDICINE
Payer: COMMERCIAL

## 2018-10-04 DIAGNOSIS — F31.9 BIPOLAR AFFECTIVE DISORDER, REMISSION STATUS UNSPECIFIED (H): ICD-10-CM

## 2018-10-04 PROCEDURE — 99284 EMERGENCY DEPT VISIT MOD MDM: CPT | Mod: Z6 | Performed by: EMERGENCY MEDICINE

## 2018-10-04 PROCEDURE — 90791 PSYCH DIAGNOSTIC EVALUATION: CPT

## 2018-10-04 PROCEDURE — 99285 EMERGENCY DEPT VISIT HI MDM: CPT | Mod: 25 | Performed by: EMERGENCY MEDICINE

## 2018-10-04 ASSESSMENT — ENCOUNTER SYMPTOMS
DYSPHORIC MOOD: 1
NERVOUS/ANXIOUS: 1

## 2018-10-04 NOTE — ED AVS SNAPSHOT
" [facility], Emergency Department    [address]    [facility] [place] [address]    Phone:  [phone]    Fax:  [phone]                                       Kailey Argueta   MRN: [id number]    Department:  [facility], Emergency Department   Date of Visit:  10/4/2018           Patient Information     Date Of Birth          2000        Your diagnoses for this visit were:     Bipolar affective disorder, remission status unspecified (H)        You were seen by Elvis Ramirez MD.        Discharge Instructions         Bipolar Disorder  Bipolar disorder is an illness that causes strong mood swings between depression and  angel . It used to be called \"manic depression.\" The mood swings are different from the normal ups and downs we all experience in our lives. They are more severe, last longer, and can interfere with work and relationships. These episodes are changes from our usual moods and behavior. Their severity can be mild, or drastic and explosive.    In a manic episode, you may think fast and do things quickly. It may seem like you are getting a lot done. At first, this may feel very good. But in the extreme this can lead to a lifestyle that is disorganized, chaotic, and includes risky behavior (spending sprees, sexual acting-out, or drug use). In later stages, it may affect eating (no interest in food) and sleeping (unable to sleep for days at a time). Speech may speed up and become difficult for others to understand. You may appear to others as if you are in your own world.    In a depressive episode, you may feel a lack of interest in normal activities. Sometimes there is sadness or guilt without any clear reason. Thinking may become slow and there can be a lack energy or feeling of hopelessness. Some people have thoughts of harming themselves at this stage. Thoughts can even turn to suicide.  Between these phases you may actually feel OK. This does not mean that the illness is gone. People " with this disorder will usually have to treat it all of their life. Medicine and good care can greatly reduce the symptoms.  The exact cause of this illness is unknown. However, there is a genetic link that makes a person more likely to get this problem. Also, the use of drugs such as speed (amphetamine) and cocaine increase the chances of this illness appearing.  Home care  Here is what you can do at home:    Ongoing care and support help people manage this disease. Find a healthcare provider and therapist who meet your needs. Seek help when you feel like you may be heading into either a manic episode or a depressive state.    Be sure to take your medicine and get regular blood work to check the levels of medicine in your body. Take the medicine and get the follow-up lab work as prescribed, even if you think you don t need to do it.    Be certain to tell each of your healthcare providers about all of the prescription and over-the-counter medicines, and supplements you take. Certain supplements interact with medicines and result in dangerous side effects. You can also use your pharmacist as a resource person when you have questions about medicine interactions.    Talk with your family and trusted friends about your thoughts and feelings. Ask them to help you recognize behavior changes early so you can get help and medicines can be adjusted.    Alcohol and drugs can bring on an episode, and make them worse    If your life is severely impacted by this illness, the Americans with Disabilities Act (ADA) may provide help. The ADA protects people with chronic physical and mental health problems. If you are having trouble keeping jobs, managing workplace issues, or caring for yourself because of your bipolar disorder, contact your local ADA office to see if it can help. The US Department of Justice operates a toll-free ADA information line at: 879.647.2433 (Voice); or 242-304-9230 (TTY). It can help you locate a local  office.    Follow-up care  Follow up with your healthcare provider or therapist as advised. They can help you to find ways to improve your life.  Call 911  Call 911 if any of these happen:    You have suicidal thoughts, a plan, and the means to  harm yourself, or serious thoughts of hurting someone else    Trouble breathing    Confusion    Drowsiness or trouble wakening    Fainting or loss of consciousness    Rapid heart rate, very low heart rate, or a new irregular heart rate    Seizure    New chest pain that becomes more severe, lasts longer, or spreads into your shoulder, arm, neck, jaw, or back  When to seek medical advice  Call your healthcare provider right away if any of these happen:    Feeling like your symptoms are getting worse (depression, agitation, and excess energy)    Unable to eat or sleep for more than 48 hours    Feeling out of control (racing thoughts, or poor concentration)    Feeling like you want to harm yourself or another    Being unable to care for yourself  Date Last Reviewed: 10/1/2017    8694-6147 [employer]. [address] [place] [address]. All rights reserved. This information is not intended as a substitute for professional medical care. Always follow your healthcare professional's instructions.          24 Hour Appointment Hotline       To make an appointment at any [facility], call [phone] ([phone]). If you don't have a family doctor or clinic, we will help you find one. [facility] are conveniently located to serve the needs of you and your family.             Review of your medicines      Our records show that you are taking the medicines listed below. If these are incorrect, please call your family doctor or clinic.        Dose / Directions Last dose taken    amitriptyline 25 MG tablet   Commonly known as:  ELAVIL   Quantity:  14 tablet        TAKE 1 TABLET (25 MG) BY MOUTH AT BEDTIME   Refills:  0        cefdinir 300 MG capsule    Commonly known as:  OMNICEF   Dose:  300 mg   Quantity:  20 capsule        Take 1 capsule (300 mg) by mouth 2 times daily   Refills:  0        fluticasone 50 MCG/ACT spray   Commonly known as:  FLONASE   Dose:  1-2 spray   Quantity:  1 Bottle        Spray 1-2 sprays into both nostrils daily   Refills:  11        LAMICTAL 100 MG tablet   Quantity:  60 tablet   Generic drug:  lamoTRIgine        2 tab QD   Refills:  0                Orders Needing Specimen Collection     Ordered          10/04/18 2321  Drug abuse screen 6 urine (tox) - STAT, [facility]: STAT, Needs to be Collected     Scheduled Task Status   10/04/18 2322 Collect Drug abuse screen 6 urine (tox) Open   Order Class:  PCU Collect                10/04/18 2321  HCG qualitative urine - STAT, [facility]: STAT, Needs to be Collected     Scheduled Task Status   10/04/18 2322 Collect HCG qualitative urine Open   Order Class:  PCU Collect                  Pending Results     No orders found from 10/2/2018 to 10/5/2018.            Pending Culture Results     No orders found from 10/2/2018 to 10/5/2018.            Pending Results Instructions     If you had any lab results that were not finalized at the time of your Discharge, you can call the ED Lab Result RN at [phone]. You will be contacted by this team for any positive Lab results or changes in treatment. The nurses are available 7 days a week from 10A to 6:30P.  You can leave a message 24 hours per day and they will return your call.        Thank you for choosing [facility]       Thank you for choosing [facility] for your care. Our goal is always to provide you with excellent care. Hearing back from our patients is one way we can continue to improve our services. Please take a few minutes to complete the written survey that you may receive in the mail after you visit with us. Thank you!        [employer] Information     "[employer]" gives you secure access to your electronic health record. If you see a primary care provider,  you can also send messages to your care team and make appointments. If you have questions, please call your primary care clinic.  If you do not have a primary care provider, please call [phone] and they will assist you.        Care EveryWhere ID     This is your Care EveryWhere ID. This could be used by other organizations to access your [place] medical records  [id number]        Equal Access to Services     MEHDI BLACKBURN : Hadii christiano Cota, wawyattda lon, qasarahta kaalmanicole bond, mary nielsen. So [facility] [phone].    ATENCIÓN: Si habla español, tiene a quevedo disposición servicios gratuitos de asistencia lingüística. Llame al [phone].    We comply with applicable federal civil rights laws and [place] laws. We do not discriminate on the basis of race, color, national origin, age, disability, sex, sexual orientation, or gender identity.            After Visit Summary       This is your record. Keep this with you and show to your community pharmacist(s) and doctor(s) at your next visit.

## 2018-10-04 NOTE — ED AVS SNAPSHOT
[facility], [place], Emergency Department    [address]    [facility] [address]    Phone:  [phone]    Fax:  [phone]                                       Kailey Argueta   MRN: [id number]    Department:  [facility], Emergency Department   Date of Visit:  10/4/2018           After Visit Summary Signature Page     I have received my discharge instructions, and my questions have been answered. I have discussed any challenges I see with this plan with the nurse or doctor.    ..........................................................................................................................................  Patient/Patient Representative Signature      ..........................................................................................................................................  Patient Representative Print Name and Relationship to Patient    ..................................................               ................................................  Date                                   Time    ..........................................................................................................................................  Reviewed by Signature/Title    ...................................................              ..............................................  Date                                               Time          22EPIC Rev 08/18

## 2018-10-05 VITALS
RESPIRATION RATE: 18 BRPM | HEART RATE: 94 BPM | TEMPERATURE: 97.8 F | OXYGEN SATURATION: 98 % | SYSTOLIC BLOOD PRESSURE: 140 MMHG | WEIGHT: 217 LBS | DIASTOLIC BLOOD PRESSURE: 97 MMHG | BODY MASS INDEX: 35.02 KG/M2
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
## 2018-10-05 NOTE — DISCHARGE INSTRUCTIONS
"  Bipolar Disorder  Bipolar disorder is an illness that causes strong mood swings between depression and  angel . It used to be called \"manic depression.\" The mood swings are different from the normal ups and downs we all experience in our lives. They are more severe, last longer, and can interfere with work and relationships. These episodes are changes from our usual moods and behavior. Their severity can be mild, or drastic and explosive.    In a manic episode, you may think fast and do things quickly. It may seem like you are getting a lot done. At first, this may feel very good. But in the extreme this can lead to a lifestyle that is disorganized, chaotic, and includes risky behavior (spending sprees, sexual acting-out, or drug use). In later stages, it may affect eating (no interest in food) and sleeping (unable to sleep for days at a time). Speech may speed up and become difficult for others to understand. You may appear to others as if you are in your own world.    In a depressive episode, you may feel a lack of interest in normal activities. Sometimes there is sadness or guilt without any clear reason. Thinking may become slow and there can be a lack energy or feeling of hopelessness. Some people have thoughts of harming themselves at this stage. Thoughts can even turn to suicide.  Between these phases you may actually feel OK. This does not mean that the illness is gone. People with this disorder will usually have to treat it all of their life. Medicine and good care can greatly reduce the symptoms.  The exact cause of this illness is unknown. However, there is a genetic link that makes a person more likely to get this problem. Also, the use of drugs such as speed (amphetamine) and cocaine increase the chances of this illness appearing.  Home care  Here is what you can do at home:    Ongoing care and support help people manage this disease. Find a healthcare provider and therapist who meet your needs. Seek " help when you feel like you may be heading into either a manic episode or a depressive state.    Be sure to take your medicine and get regular blood work to check the levels of medicine in your body. Take the medicine and get the follow-up lab work as prescribed, even if you think you don t need to do it.    Be certain to tell each of your healthcare providers about all of the prescription and over-the-counter medicines, and supplements you take. Certain supplements interact with medicines and result in dangerous side effects. You can also use your pharmacist as a resource person when you have questions about medicine interactions.    Talk with your family and trusted friends about your thoughts and feelings. Ask them to help you recognize behavior changes early so you can get help and medicines can be adjusted.    Alcohol and drugs can bring on an episode, and make them worse    If your life is severely impacted by this illness, the Americans with Disabilities Act (ADA) may provide help. The ADA protects people with chronic physical and mental health problems. If you are having trouble keeping jobs, managing workplace issues, or caring for yourself because of your bipolar disorder, contact your local ADA office to see if it can help. The US Department of Justice operates a toll-free ADA information line at: 238.621.8063 (Voice); or 113-701-1518 (TTY). It can help you locate a local office.    Follow-up care  Follow up with your healthcare provider or therapist as advised. They can help you to find ways to improve your life.  Call 911  Call 911 if any of these happen:    You have suicidal thoughts, a plan, and the means to  harm yourself, or serious thoughts of hurting someone else    Trouble breathing    Confusion    Drowsiness or trouble wakening    Fainting or loss of consciousness    Rapid heart rate, very low heart rate, or a new irregular heart rate    Seizure    New chest pain that becomes more severe, lasts  longer, or spreads into your shoulder, arm, neck, jaw, or back  When to seek medical advice  Call your healthcare provider right away if any of these happen:    Feeling like your symptoms are getting worse (depression, agitation, and excess energy)    Unable to eat or sleep for more than 48 hours    Feeling out of control (racing thoughts, or poor concentration)    Feeling like you want to harm yourself or another    Being unable to care for yourself  Date Last Reviewed: 10/1/2017    7851-1100 [employer]. [address] [place], [place] [id number]. All rights reserved. This information is not intended as a substitute for professional medical care. Always follow your healthcare professional's instructions.

## 2018-10-26 ENCOUNTER — OFFICE VISIT (OUTPATIENT)
Dept: URGENT CARE | Facility: URGENT CARE | Age: 18
End: 2018-10-26
Payer: COMMERCIAL

## 2018-10-26 VITALS
DIASTOLIC BLOOD PRESSURE: 86 MMHG | SYSTOLIC BLOOD PRESSURE: 144 MMHG | HEART RATE: 113 BPM | TEMPERATURE: 99.6 F | WEIGHT: 217 LBS | OXYGEN SATURATION: 97 % | BODY MASS INDEX: 35.02 KG/M2

## 2018-10-26 DIAGNOSIS — R07.0 THROAT PAIN: Primary | ICD-10-CM

## 2018-10-26 DIAGNOSIS — J03.90 TONSILLITIS: ICD-10-CM

## 2018-10-26 LAB
DEPRECATED S PYO AG THROAT QL EIA: NORMAL
HETEROPH AB SER QL: NEGATIVE
SPECIMEN SOURCE: NORMAL

## 2018-10-26 PROCEDURE — 36415 COLL VENOUS BLD VENIPUNCTURE: CPT | Performed by: PHYSICIAN ASSISTANT

## 2018-10-26 PROCEDURE — 87081 CULTURE SCREEN ONLY: CPT | Performed by: PHYSICIAN ASSISTANT

## 2018-10-26 PROCEDURE — 99213 OFFICE O/P EST LOW 20 MIN: CPT | Performed by: PHYSICIAN ASSISTANT

## 2018-10-26 PROCEDURE — 86308 HETEROPHILE ANTIBODY SCREEN: CPT | Performed by: PHYSICIAN ASSISTANT

## 2018-10-26 PROCEDURE — 87880 STREP A ASSAY W/OPTIC: CPT | Performed by: PHYSICIAN ASSISTANT

## 2018-10-26 RX ORDER — AZITHROMYCIN 200 MG/5ML
POWDER, FOR SUSPENSION ORAL
Qty: 40 ML | Refills: 0 | Status: SHIPPED | OUTPATIENT
Start: 2018-10-26 | End: 2018-12-17

## 2018-10-26 ASSESSMENT — ENCOUNTER SYMPTOMS
SORE THROAT: 1
COUGH: 0
VOMITING: 0
DIARRHEA: 0
FEVER: 1
HEADACHES: 1
NAUSEA: 0
CHILLS: 1

## 2018-10-26 NOTE — MR AVS SNAPSHOT
After Visit Summary   10/26/2018    Kailey Argueta    MRN: [id number]           Patient Information     Date Of Birth          2000        Visit Information        Provider Department      10/26/2018 1:15 PM Opal Porras PA-C [facility]        Today's Diagnoses     Throat pain    -  1    Tonsillitis           Follow-ups after your visit        Who to contact     If you have questions or need follow up information about today's clinic visit or your schedule please contact [facility] directly at [phone].  Normal or non-critical lab and imaging results will be communicated to you by [employer], letter or phone within 4 business days after the clinic has received the results. If you do not hear from us within 7 days, please contact the clinic through [employer] or phone. If you have a critical or abnormal lab result, we will notify you by phone as soon as possible.  Submit refill requests through [email or web address] or call your pharmacy and they will forward the refill request to us. Please allow 3 business days for your refill to be completed.          Additional Information About Your Visit        MyChart Information     [email or web address] gives you secure access to your electronic health record. If you see a primary care provider, you can also send messages to your care team and make appointments. If you have questions, please call your primary care clinic.  If you do not have a primary care provider, please call [phone] and they will assist you.        Care EveryWhere ID     This is your Care EveryWhere ID. This could be used by other organizations to access your [facility] medical records  [id number]        Your Vitals Were     Pulse Temperature Pulse Oximetry BMI (Body Mass Index)          113 99.6  F (37.6  C) (Oral) 97% 35.02 kg/m2         Blood Pressure from Last 3 Encounters:   10/26/18 144/86   10/05/18 (!) 140/97   09/29/18 120/80    Weight from Last 3  Encounters:   10/26/18 217 lb (98.4 kg)   10/04/18 217 lb (98.4 kg)   02/16/18 217 lb 12.8 oz (98.8 kg)              We Performed the Following     Beta strep group A culture     Mononucleosis screen     Strep, Rapid Screen          Today's Medication Changes          These changes are accurate as of 10/26/18  4:23 PM.  If you have any questions, ask your nurse or doctor.               Start taking these medicines.        Dose/Directions    azithromycin 200 MG/5ML suspension   Commonly known as:  ZITHROMAX   Used for:  Tonsillitis   Started by:  Opal Porras PA-C        Take 12.5 ml on day 1 then 6.25 ml on days 2 - 5   Quantity:  40 mL   Refills:  0            Where to get your medicines      These medications were sent to [facility]/pharmacy #[id number] - [place], [place] - [address]  [address] [address] [facility] [address]     Phone:  [phone]     azithromycin 200 MG/5ML suspension                Primary Care Provider Office Phone # Fax #    [facility] [phone] [address] [facility] [address]        Equal Access to Services     NEL [facility] : Hadii christiano jonaso Soolga, waaxda luarashadaha, qaybta kaalmada adeegyada, mary cid . So [facility] [phone].    ATENCIÓN: Si habla español, tiene a quevedo disposición servicios gratuitos de asistencia lingüística. Llame al [phone].    We comply with applicable federal civil rights laws and [place] laws. We do not discriminate on the basis of race, color, national origin, age, disability, sex, sexual orientation, or gender identity.            Thank you!     Thank you for choosing [facility]  for your care. Our goal is always to provide you with excellent care. Hearing back from our patients is one way we can continue to improve our services. Please take a few minutes to complete the written survey that you may receive in the mail after your visit with us. Thank  you!             Your Updated Medication List - Protect others around you: Learn how to safely use, store and throw away your medicines at www.disposemymeds.org.          This list is accurate as of 10/26/18  4:23 PM.  Always use your most recent med list.                   Brand Name Dispense Instructions for use Diagnosis    amitriptyline 25 MG tablet    ELAVIL    14 tablet    TAKE 1 TABLET (25 MG) BY MOUTH AT BEDTIME    Migraine without status migrainosus, not intractable, unspecified migraine type       azithromycin 200 MG/5ML suspension    ZITHROMAX    40 mL    Take 12.5 ml on day 1 then 6.25 ml on days 2 - 5    Tonsillitis       cefdinir 300 MG capsule    OMNICEF    20 capsule    Take 1 capsule (300 mg) by mouth 2 times daily    Acute sinusitis with coexisting condition requiring prophylactic treatment       fluticasone 50 MCG/ACT spray    FLONASE    1 Bottle    Spray 1-2 sprays into both nostrils daily    Acute sinusitis with coexisting condition requiring prophylactic treatment       LAMICTAL 100 MG tablet   Generic drug:  lamoTRIgine     60 tablet    2 tab QD

## 2018-10-26 NOTE — PROGRESS NOTES
SUBJECTIVE:   Kailey Argueta is a 18 year old female presenting with a chief complaint of   Chief Complaint   Patient presents with     Urgent Care     Pharyngitis     Feels like throat is closing, hard time breathing, painnful to swallow, 12 hours        She is an established patient of [facility].    UR Adult    Onset of symptoms was 2 day(s) ago.  Course of illness is worsening.    Severity moderate  Current and Associated symptoms: fever and sore throat  Treatment measures tried include Fluids and None tried.  Predisposing factors include None.        Review of Systems   Constitutional: Positive for chills and fever.   HENT: Positive for sore throat.    Respiratory: Negative for cough.    Gastrointestinal: Negative for diarrhea, nausea and vomiting.   Neurological: Positive for headaches.       Past Medical History:   Diagnosis Date     ADHD (attention deficit hyperactivity disorder)      Bipolar 1 disorder (H)      Headache      Family History   Problem Relation Age of Onset     Diabetes Mother      Hypertension Mother      Current Outpatient Prescriptions   Medication Sig Dispense Refill     azithromycin (ZITHROMAX) 200 MG/5ML suspension Take 12.5 ml on day 1 then 6.25 ml on days 2 - 5 40 mL 0     amitriptyline (ELAVIL) 25 MG tablet TAKE 1 TABLET (25 MG) BY MOUTH AT BEDTIME (Patient not taking: Reported on 9/29/2018) 14 tablet 0     cefdinir (OMNICEF) 300 MG capsule Take 1 capsule (300 mg) by mouth 2 times daily 20 capsule 0     fluticasone (FLONASE) 50 MCG/ACT spray Spray 1-2 sprays into both nostrils daily 1 Bottle 11     lamoTRIgine (LAMICTAL) 100 MG tablet 2 tab QD 60 tablet      Social History   Substance Use Topics     Smoking status: Current Some Day Smoker     Smokeless tobacco: Never Used     Alcohol use No       OBJECTIVE  /86  Pulse 113  Temp 99.6  F (37.6  C) (Oral)  Wt 217 lb (98.4 kg)  SpO2 97%  BMI 35.02 kg/m2    Physical Exam   Constitutional: She appears well-developed and  well-nourished. No distress.   HENT:   Head: Normocephalic and atraumatic.   Right Ear: Tympanic membrane normal.   Left Ear: Tympanic membrane normal.   Mouth/Throat: Posterior oropharyngeal erythema present. Tonsils are 2+ on the right. Tonsils are 2+ on the left. Tonsillar exudate.   Eyes: Conjunctivae are normal.   Neck: Normal range of motion.   Cardiovascular: Regular rhythm and normal heart sounds.    Pulmonary/Chest: Effort normal and breath sounds normal. No respiratory distress.   Neurological: She is alert.   Skin: Skin is warm and dry.   Psychiatric: She has a normal mood and affect.       Labs:  Results for orders placed or performed in visit on 10/26/18 (from the past 24 hour(s))   Strep, Rapid Screen   Result Value Ref Range    Specimen Description Throat     Rapid Strep A Screen       NEGATIVE: No Group A streptococcal antigen detected by immunoassay, await culture report.   Mononucleosis screen   Result Value Ref Range    Mononucleosis Screen Negative NEG^Negative         ASSESSMENT:      ICD-10-CM    1. Throat pain R07.0 Strep, Rapid Screen     Beta strep group A culture     Mononucleosis screen   2. Tonsillitis J03.90 azithromycin (ZITHROMAX) 200 MG/5ML suspension        Medical Decision Making:    Differential Diagnosis:  URI Adult/Peds:  Strep pharyngitis, Tonsilitis, Viral pharyngitis, Viral syndrome and Viral upper respiratory illness    Serious Comorbid Conditions:  Adult:  None    PLAN:  Acute tonsillitis: Zithromax Rx. Suspension Rx as patient finds it very difficult to swallow pills due to pain.Tylenol or motrin prn pain. We will communicate any positive findings on the throat culture result. Follow up if any worsening symptoms. She agrees.    Followup:    If not improving or if condition worsens, follow up with your Primary Care Provider

## 2018-10-27 LAB
BACTERIA SPEC CULT: NORMAL
SPECIMEN SOURCE: NORMAL

## 2018-12-16 ENCOUNTER — NURSE TRIAGE (OUTPATIENT)
Dept: NURSING | Facility: CLINIC | Age: 18
End: 2018-12-16

## 2018-12-16 NOTE — TELEPHONE ENCOUNTER
Kailey is 5 to 7 weeks pregnant  is calling and states that Kailey is pregnant and is having black discharge and red spotting.  Second time for spotting.      Reason for Disposition    Patient sounds very sick or weak to the triager    Additional Information    Negative: Shock suspected (e.g., cold/pale/clammy skin, too weak to stand, low BP, rapid pulse)    Negative: Difficult to awaken or acting confused  (e.g., disoriented, slurred speech)    Negative: Passed out (i.e., lost consciousness, collapsed and was not responding)    Negative: Sounds like a life-threatening emergency to the triager    Negative: SEVERE abdominal pain    Negative: [1] SEVERE vaginal bleeding (i.e., soaking 2 pads / hour, large blood clots) AND [2] present 2 or more hours    Negative: [1] MODERATE vaginal bleeding (i.e., soaking 1 pad / hour; clots) AND [2] present > 6 hours    Negative: [1] MODERATE vaginal bleeding (i.e., soaking 1 pad / hour; clots) AND [2] pregnant > 12 weeks    Negative: Passed tissue (e.g., gray-white)    Negative: Shoulder pain    Negative: Pale skin (pallor) of new onset or worsening    Protocols used: PREGNANCY - VAGINAL BLEEDING LESS THAN 20 WEEKS EGA-ADULT-

## 2018-12-17 ENCOUNTER — APPOINTMENT (OUTPATIENT)
Dept: ULTRASOUND IMAGING | Facility: CLINIC | Age: 18
End: 2018-12-17
Attending: EMERGENCY MEDICINE
Payer: COMMERCIAL

## 2018-12-17 ENCOUNTER — HOSPITAL ENCOUNTER (EMERGENCY)
Facility: CLINIC | Age: 18
Discharge: HOME | End: 2018-12-17
Attending: EMERGENCY MEDICINE | Admitting: EMERGENCY MEDICINE
Payer: COMMERCIAL

## 2018-12-17 VITALS
DIASTOLIC BLOOD PRESSURE: 91 MMHG | BODY MASS INDEX: 35.51 KG/M2 | SYSTOLIC BLOOD PRESSURE: 131 MMHG | RESPIRATION RATE: 16 BRPM | TEMPERATURE: 97.9 F | OXYGEN SATURATION: 99 % | WEIGHT: 220 LBS

## 2018-12-17 DIAGNOSIS — O20.0 THREATENED MISCARRIAGE: ICD-10-CM

## 2018-12-17 LAB
ABO + RH BLD: NORMAL
ABO + RH BLD: NORMAL
ALBUMIN UR-MCNC: NEGATIVE MG/DL
APPEARANCE UR: ABNORMAL
B-HCG SERPL-ACNC: ABNORMAL IU/L (ref 0–5)
BASOPHILS # BLD AUTO: 0 10E9/L (ref 0–0.2)
BASOPHILS NFR BLD AUTO: 0.5 %
BILIRUB UR QL STRIP: NEGATIVE
BLD GP AB SCN SERPL QL: NORMAL
BLOOD BANK CMNT PATIENT-IMP: NORMAL
COLOR UR AUTO: YELLOW
DIFFERENTIAL METHOD BLD: NORMAL
EOSINOPHIL # BLD AUTO: 0.5 10E9/L (ref 0–0.7)
EOSINOPHIL NFR BLD AUTO: 5.6 %
ERYTHROCYTE [DISTWIDTH] IN BLOOD BY AUTOMATED COUNT: 12.8 % (ref 10–15)
GLUCOSE UR STRIP-MCNC: NEGATIVE MG/DL
HCT VFR BLD AUTO: 46.1 % (ref 35–47)
HGB BLD-MCNC: 15.3 G/DL (ref 11.7–15.7)
HGB UR QL STRIP: ABNORMAL
IMM GRANULOCYTES # BLD: 0 10E9/L (ref 0–0.4)
IMM GRANULOCYTES NFR BLD: 0.2 %
KETONES UR STRIP-MCNC: NEGATIVE MG/DL
LEUKOCYTE ESTERASE UR QL STRIP: NEGATIVE
LYMPHOCYTES # BLD AUTO: 3.3 10E9/L (ref 0.8–5.3)
LYMPHOCYTES NFR BLD AUTO: 37.7 %
MCH RBC QN AUTO: 29.8 PG (ref 26.5–33)
MCHC RBC AUTO-ENTMCNC: 33.2 G/DL (ref 31.5–36.5)
MCV RBC AUTO: 90 FL (ref 78–100)
MONOCYTES # BLD AUTO: 0.5 10E9/L (ref 0–1.3)
MONOCYTES NFR BLD AUTO: 5.2 %
MUCOUS THREADS #/AREA URNS LPF: PRESENT /LPF
NEUTROPHILS # BLD AUTO: 4.5 10E9/L (ref 1.6–8.3)
NEUTROPHILS NFR BLD AUTO: 50.8 %
NITRATE UR QL: NEGATIVE
NRBC # BLD AUTO: 0 10*3/UL
NRBC BLD AUTO-RTO: 0 /100
PH UR STRIP: 5 PH (ref 5–7)
PLATELET # BLD AUTO: 274 10E9/L (ref 150–450)
RBC # BLD AUTO: 5.14 10E12/L (ref 3.8–5.2)
RBC #/AREA URNS AUTO: 1 /HPF (ref 0–2)
SOURCE: ABNORMAL
SP GR UR STRIP: 1.02 (ref 1–1.03)
SPECIMEN EXP DATE BLD: NORMAL
SPECIMEN SOURCE: NORMAL
SQUAMOUS #/AREA URNS AUTO: 1 /HPF (ref 0–1)
UROBILINOGEN UR STRIP-MCNC: 0 MG/DL (ref 0–2)
WBC # BLD AUTO: 8.8 10E9/L (ref 4–11)
WBC #/AREA URNS AUTO: 2 /HPF (ref 0–5)
WET PREP SPEC: NORMAL

## 2018-12-17 PROCEDURE — 86850 RBC ANTIBODY SCREEN: CPT | Performed by: EMERGENCY MEDICINE

## 2018-12-17 PROCEDURE — 99285 EMERGENCY DEPT VISIT HI MDM: CPT | Mod: 25

## 2018-12-17 PROCEDURE — 87591 N.GONORRHOEAE DNA AMP PROB: CPT | Performed by: EMERGENCY MEDICINE

## 2018-12-17 PROCEDURE — 86901 BLOOD TYPING SEROLOGIC RH(D): CPT | Performed by: EMERGENCY MEDICINE

## 2018-12-17 PROCEDURE — 81001 URINALYSIS AUTO W/SCOPE: CPT | Performed by: EMERGENCY MEDICINE

## 2018-12-17 PROCEDURE — 84702 CHORIONIC GONADOTROPIN TEST: CPT | Performed by: EMERGENCY MEDICINE

## 2018-12-17 PROCEDURE — 86900 BLOOD TYPING SEROLOGIC ABO: CPT | Performed by: EMERGENCY MEDICINE

## 2018-12-17 PROCEDURE — 85025 COMPLETE CBC W/AUTO DIFF WBC: CPT | Performed by: EMERGENCY MEDICINE

## 2018-12-17 PROCEDURE — 87210 SMEAR WET MOUNT SALINE/INK: CPT | Performed by: EMERGENCY MEDICINE

## 2018-12-17 PROCEDURE — 93976 VASCULAR STUDY: CPT

## 2018-12-17 PROCEDURE — 87491 CHLMYD TRACH DNA AMP PROBE: CPT | Performed by: EMERGENCY MEDICINE

## 2018-12-17 RX ORDER — ACETAMINOPHEN 325 MG/1
650 TABLET ORAL EVERY 6 HOURS PRN
Qty: 20 TABLET | Refills: 0 | Status: SHIPPED | OUTPATIENT
Start: 2018-12-17 | End: 2019-04-08

## 2018-12-17 ASSESSMENT — ENCOUNTER SYMPTOMS
NAUSEA: 0
FEVER: 0
VOMITING: 0
LIGHT-HEADEDNESS: 0
ABDOMINAL PAIN: 1
FATIGUE: 1

## 2018-12-17 NOTE — ED AVS SNAPSHOT
[facility] Emergency Department  [address]  [facility] [address]  Phone:  [phone]  Fax:  [phone]                                    Kailey Argueta   MRN: [id number]    Department:  [facility] Emergency Department   Date of Visit:  12/17/2018           After Visit Summary Signature Page    I have received my discharge instructions, and my questions have been answered. I have discussed any challenges I see with this plan with the nurse or doctor.    ..........................................................................................................................................  Patient/Patient Representative Signature      ..........................................................................................................................................  Patient Representative Print Name and Relationship to Patient    ..................................................               ................................................  Date                                   Time    ..........................................................................................................................................  Reviewed by Signature/Title    ...................................................              ..............................................  Date                                               Time          22EPIC Rev 08/18

## 2018-12-17 NOTE — ED TRIAGE NOTES
"Patient is approximately 6-7 weeks pregnant and is having cramping and \"black\" vaginal bleeding.   "

## 2018-12-17 NOTE — ED PROVIDER NOTES
"  History     Chief Complaint:  Vaginal Bleeding    HPI   Kailey Argueta is a 18 year old female  who is currently about 6 weeks pregnant based on LMP 10/31/18 who presents to the emergency department for evaluation of vaginal bleeding. For the last 48 hours, she reports an onset of bleeding. Initially, it was bright red blood, but as the bleeding has continued the discharge has become darker almost \"black.\" This color change prompted her to present to the ED. Here, she reports additional abdominal cramping only when she walks, as well as increased fatigue. She states that she has been going through about 1 pad per day and has only passed about 2 small clots this morning. She denies any fever, emesis, dysuria or other symptoms. Patient has yet to see OBGYN. No h/o STDs.    Allergies:  NKDA    Medications:    The patient is currently on no regular medications.    Past Medical History:    ADHD  Migraines  Bipolar I disorder    Past Surgical History:    The patient does not have any pertinent past surgical history.    Family History:    Diabetes   HTN    Social History:  Marital Status:  Single [1]  Boyfriend at bedside  ~6 weeks pregnant.   Positive for tobacco use. \"1 cigarette per week\"  Positive for alcohol use.   Positive for marijuana use.      Review of Systems   Constitutional: Positive for fatigue. Negative for fever.   HENT: Negative.    Gastrointestinal: Positive for abdominal pain. Negative for nausea and vomiting.   Genitourinary: Positive for vaginal bleeding.   Neurological: Negative for light-headedness.   All other systems reviewed and are negative.      Physical Exam     Patient Vitals for the past 24 hrs:   BP Temp Temp src Heart Rate Resp SpO2 Weight   18 1336 (!) 131/91 97.9  F (36.6  C) Temporal 99 16 99 % 99.8 kg (220 lb)     Physical Exam  Nursing note and vitals reviewed.  Constitutional: Well nourished. Resting comfortably.   Eyes: Conjunctiva normal.  Pupils are equal, round, and " reactive to light.   ENT: Nose normal. Mucous membranes pink and moist.    Neck: Normal range of motion.  CVS: Normal rate, regular rhythm.  Normal heart sounds.  No murmur.  Pulmonary: Lungs clear to auscultation bilaterally. No wheezes/rales/rhonchi.  GI: Abdomen soft. Nontender, nondistended. No rigidity or guarding.  Pelvic: Normal external genitalia.  No active vaginal bleeding, scant amount over older appearing blood in vaginal vault, cervical os closed.  No cervical discharge.  No CMT or adnexal tenderness noted.  Chaperone EMT Nicole    MSK: No calf tenderness or swelling.  Neuro: Alert. Follows simple commands.  Skin: Skin is warm and dry. No rash noted.   Psychiatric: Normal affect.       Emergency Department Course   Imaging:  Radiographic findings were communicated with the patient who voiced understanding of the findings.    US Pelvic, Complete w Transvaginal:  Probable intrauterine gestational sac and fetal pole.  Fetal cardiac activity not detected. This may just be too early. Early  intrauterine fetal demise not excluded. Recommend follow-up ultrasound  in one week. No sonographic evidence of hemorrhage, as per radiology.     Laboratory:  CBC: WBC: 8.8, HGB: 15.3, PLT: 274    UA with Microscopic: Blood: Small (A), Mucous: Present (A), o/w WNL    HCG (blood): 13,876 (H)    Blood type: O+    Wet prep: Pending    Neisseria gonorrhoeae: Pending    Chlamydia trachomatis: Pending    Emergency Department Course:  (1189) Nursing notes and vitals reviewed. I performed an exam of the patient as documented above.      IV inserted. Medicine administered as documented above. Blood drawn. This was sent to the lab for further testing, results above.     The patient was sent for a pelvic US while in the emergency department, findings above.      The patient provided a urine sample here in the emergency department. This was sent for laboratory testing, findings above.     (3439) I rechecked the patient and discussed  the results of her workup thus far.      Findings and plan explained to the Patient. Patient discharged home with instructions regarding supportive care, medications, and reasons to return. The importance of close follow-up was reviewed. She was prescribed Tylenol. I also provided a referral for an OB and pressed the importance of follow up within 48 hours.      I personally reviewed the laboratory and imaging results with the Patient and answered all related questions prior to discharge.        Impression & Plan      Medical Decision Making:  Kailey Argueta is a 18 year old female who presents for evaluation of vaginal bleeding.  The workup here suggests threatened miscarriage.  Supportive outpatient management is therefore indicated.  I discussed with patient her ultrasound suggests early IUP though no fetal heart tones detected which could be related to early pregnancy.  Patient is not a candidate for RhoGam.  UA without infection.  H/H stable.  Abdomen is non-peritoneal.  Plan is home, close follow-up with OB which I provided with plans to repeat BHCG in 48 hours.  Counseled likely follow-up repeat  ultrasound in 1 week.  Return to ED for worsening pain, heavy vaginal bleeding (more than 1 pad soaked every hour).  Questions were answered.        Critical Care time:  none    Diagnosis:    ICD-10-CM    1. Threatened miscarriage O20.0 ABO/Rh type and screen       Disposition:  discharged to home    Discharge Medications:     Dose / Directions   acetaminophen 325 MG tablet  Commonly known as:  TYLENOL      Dose:  650 mg  Take 2 tablets (650 mg) by mouth every 6 hours as needed for mild pain  Quantity:  20 tablet  Refills:  0   Scribe Disclosure:  I, Roxy Alvarenga, am serving as a scribe on 12/17/2018 at 3:48  PM to personally document services performed by Tara Boudreaux DO based on my observations and the provider's statements to me.     Roxy Alvarenga  12/17/2018   [facility] EMERGENCY  DEPARTMENT       Tara Boudreaux,   12/17/18 1739

## 2018-12-18 LAB
C TRACH DNA SPEC QL NAA+PROBE: NEGATIVE
N GONORRHOEA DNA SPEC QL NAA+PROBE: NEGATIVE
SPECIMEN SOURCE: NORMAL
SPECIMEN SOURCE: NORMAL

## 2019-01-16 ENCOUNTER — APPOINTMENT (OUTPATIENT)
Dept: ULTRASOUND IMAGING | Facility: CLINIC | Age: 19
End: 2019-01-16
Payer: COMMERCIAL

## 2019-01-16 ENCOUNTER — HOSPITAL ENCOUNTER (EMERGENCY)
Facility: CLINIC | Age: 19
Discharge: HOME | End: 2019-01-16
Attending: EMERGENCY MEDICINE | Admitting: EMERGENCY MEDICINE
Payer: COMMERCIAL

## 2019-01-16 VITALS
OXYGEN SATURATION: 93 % | HEIGHT: 67 IN | RESPIRATION RATE: 18 BRPM | BODY MASS INDEX: 35.31 KG/M2 | DIASTOLIC BLOOD PRESSURE: 81 MMHG | WEIGHT: 225 LBS | TEMPERATURE: 98.1 F | SYSTOLIC BLOOD PRESSURE: 142 MMHG

## 2019-01-16 DIAGNOSIS — O26.899 ABDOMINAL PAIN DURING INTRAUTERINE PREGNANCY: ICD-10-CM

## 2019-01-16 DIAGNOSIS — R10.9 ABDOMINAL PAIN DURING INTRAUTERINE PREGNANCY: ICD-10-CM

## 2019-01-16 DIAGNOSIS — Z34.91 FIRST TRIMESTER PREGNANCY: ICD-10-CM

## 2019-01-16 LAB
ALBUMIN SERPL-MCNC: 3.6 G/DL (ref 3.4–5)
ALBUMIN UR-MCNC: NEGATIVE MG/DL
ALP SERPL-CCNC: 73 U/L (ref 40–150)
ALT SERPL W P-5'-P-CCNC: 32 U/L (ref 0–50)
ANION GAP SERPL CALCULATED.3IONS-SCNC: 4 MMOL/L (ref 3–14)
APPEARANCE UR: ABNORMAL
AST SERPL W P-5'-P-CCNC: 15 U/L (ref 0–45)
B-HCG FREE SERPL-ACNC: >2000 IU/L
B-HCG SERPL-ACNC: ABNORMAL IU/L (ref 0–5)
BACTERIA #/AREA URNS HPF: ABNORMAL /HPF
BASOPHILS # BLD AUTO: 0 10E9/L (ref 0–0.2)
BASOPHILS NFR BLD AUTO: 0.3 %
BILIRUB SERPL-MCNC: 0.8 MG/DL (ref 0.2–1.3)
BILIRUB UR QL STRIP: NEGATIVE
BUN SERPL-MCNC: 5 MG/DL (ref 7–30)
CALCIUM SERPL-MCNC: 8.8 MG/DL (ref 8.5–10.1)
CHLORIDE SERPL-SCNC: 108 MMOL/L (ref 94–109)
CO2 SERPL-SCNC: 26 MMOL/L (ref 20–32)
COLOR UR AUTO: YELLOW
CREAT SERPL-MCNC: 0.6 MG/DL (ref 0.52–1.04)
DIFFERENTIAL METHOD BLD: NORMAL
EOSINOPHIL # BLD AUTO: 0.2 10E9/L (ref 0–0.7)
EOSINOPHIL NFR BLD AUTO: 3 %
ERYTHROCYTE [DISTWIDTH] IN BLOOD BY AUTOMATED COUNT: 12.7 % (ref 10–15)
GFR SERPL CREATININE-BSD FRML MDRD: >90 ML/MIN/{1.73_M2}
GLUCOSE SERPL-MCNC: 82 MG/DL (ref 70–99)
GLUCOSE UR STRIP-MCNC: NEGATIVE MG/DL
HCT VFR BLD AUTO: 42.6 % (ref 35–47)
HGB BLD-MCNC: 14.3 G/DL (ref 11.7–15.7)
HGB UR QL STRIP: NEGATIVE
IMM GRANULOCYTES # BLD: 0 10E9/L (ref 0–0.4)
IMM GRANULOCYTES NFR BLD: 0.1 %
KETONES UR STRIP-MCNC: NEGATIVE MG/DL
LEUKOCYTE ESTERASE UR QL STRIP: NEGATIVE
LYMPHOCYTES # BLD AUTO: 2.5 10E9/L (ref 0.8–5.3)
LYMPHOCYTES NFR BLD AUTO: 33.6 %
MCH RBC QN AUTO: 29.4 PG (ref 26.5–33)
MCHC RBC AUTO-ENTMCNC: 33.6 G/DL (ref 31.5–36.5)
MCV RBC AUTO: 88 FL (ref 78–100)
MONOCYTES # BLD AUTO: 0.5 10E9/L (ref 0–1.3)
MONOCYTES NFR BLD AUTO: 6.2 %
MUCOUS THREADS #/AREA URNS LPF: PRESENT /LPF
NEUTROPHILS # BLD AUTO: 4.2 10E9/L (ref 1.6–8.3)
NEUTROPHILS NFR BLD AUTO: 56.8 %
NITRATE UR QL: NEGATIVE
NRBC # BLD AUTO: 0 10*3/UL
NRBC BLD AUTO-RTO: 0 /100
PH UR STRIP: 7 PH (ref 5–7)
PLATELET # BLD AUTO: 275 10E9/L (ref 150–450)
POTASSIUM SERPL-SCNC: 3.5 MMOL/L (ref 3.4–5.3)
PROT SERPL-MCNC: 6.9 G/DL (ref 6.8–8.8)
RBC # BLD AUTO: 4.87 10E12/L (ref 3.8–5.2)
RBC #/AREA URNS AUTO: 1 /HPF (ref 0–2)
SODIUM SERPL-SCNC: 138 MMOL/L (ref 133–144)
SOURCE: ABNORMAL
SP GR UR STRIP: 1.02 (ref 1–1.03)
SQUAMOUS #/AREA URNS AUTO: 3 /HPF (ref 0–1)
UROBILINOGEN UR STRIP-MCNC: 0 MG/DL (ref 0–2)
WBC # BLD AUTO: 7.4 10E9/L (ref 4–11)
WBC #/AREA URNS AUTO: 1 /HPF (ref 0–5)

## 2019-01-16 PROCEDURE — 25000132 ZZH RX MED GY IP 250 OP 250 PS 637: Performed by: EMERGENCY MEDICINE

## 2019-01-16 PROCEDURE — 81001 URINALYSIS AUTO W/SCOPE: CPT | Performed by: EMERGENCY MEDICINE

## 2019-01-16 PROCEDURE — 96365 THER/PROPH/DIAG IV INF INIT: CPT

## 2019-01-16 PROCEDURE — 25000128 H RX IP 250 OP 636: Performed by: EMERGENCY MEDICINE

## 2019-01-16 PROCEDURE — 84702 CHORIONIC GONADOTROPIN TEST: CPT | Performed by: EMERGENCY MEDICINE

## 2019-01-16 PROCEDURE — 76801 OB US < 14 WKS SINGLE FETUS: CPT

## 2019-01-16 PROCEDURE — 99284 EMERGENCY DEPT VISIT MOD MDM: CPT | Mod: 25

## 2019-01-16 PROCEDURE — 85025 COMPLETE CBC W/AUTO DIFF WBC: CPT | Performed by: EMERGENCY MEDICINE

## 2019-01-16 PROCEDURE — 84702 CHORIONIC GONADOTROPIN TEST: CPT

## 2019-01-16 PROCEDURE — 96374 THER/PROPH/DIAG INJ IV PUSH: CPT

## 2019-01-16 PROCEDURE — 80053 COMPREHEN METABOLIC PANEL: CPT | Performed by: EMERGENCY MEDICINE

## 2019-01-16 RX ORDER — ONDANSETRON 2 MG/ML
4 INJECTION INTRAMUSCULAR; INTRAVENOUS ONCE
Status: COMPLETED | OUTPATIENT
Start: 2019-01-16 | End: 2019-01-16

## 2019-01-16 RX ORDER — ACETAMINOPHEN 325 MG/1
650 TABLET ORAL ONCE
Status: COMPLETED | OUTPATIENT
Start: 2019-01-16 | End: 2019-01-16

## 2019-01-16 RX ORDER — LAMOTRIGINE 150 MG/1
300 TABLET ORAL DAILY
COMMUNITY
End: 2019-04-08

## 2019-01-16 RX ADMIN — ONDANSETRON 4 MG: 2 INJECTION INTRAMUSCULAR; INTRAVENOUS at 18:53

## 2019-01-16 RX ADMIN — SODIUM CHLORIDE 1000 ML: 9 INJECTION, SOLUTION INTRAVENOUS at 18:53

## 2019-01-16 RX ADMIN — ACETAMINOPHEN 650 MG: 325 TABLET, FILM COATED ORAL at 18:53

## 2019-01-16 ASSESSMENT — ENCOUNTER SYMPTOMS
NAUSEA: 1
HEMATURIA: 0
DYSURIA: 0
ABDOMINAL PAIN: 1
BACK PAIN: 0
APPETITE CHANGE: 0
CONSTIPATION: 1

## 2019-01-16 ASSESSMENT — MIFFLIN-ST. JEOR: SCORE: 1823.22

## 2019-01-16 NOTE — ED PROVIDER NOTES
"  History     Chief Complaint:  Abdominal Pain    The history is provided by the patient.      Kailey Argueta is a 18 year old female with history of bipolar disorder and migraines who presents for evaluation of abdominal pain. Yesterday morning she woke from sleep with \"stabbing,\" non-radiating, right lower quadrant abdominal pain exacerbated with movement and palpation. She notes pain is improved when she \"curl[s] up into a ball.\" Pain is constant and associated with nausea and a single episode of emesis with small streaks of blood. Her appetite is normal. She has been constipated recently with last BM yesterday morning. She denies vaginal discharge, concern for STD, dysuria, or hematuria. She remarks she has had some \"mucous strands\" in her urine. She has been using ibuprofen for pain with last dose 30 minutes prior to arrival.    Notably patient's LMP was 10/31/18. She became pregnant () and was seen here for threatened miscarriage 18. She has not follow up after that ER visit and believes she had a miscarriage because she doesn't \"feel pregnant\" and doesn't have \"any of the symptoms.\" She has been using \"pull out\" for contraception in the last month. She has no history of abdominal surgeries. She smokes marijuana daily \"for pain.\"     Allergies:  No Known Drug Allergies      Medications:    acetaminophen (TYLENOL) 325 MG tablet  lamoTRIgine (LAMICTAL) 150 MG tablet     Past Medical History:    ADHD (attention deficit and hyperactivity disorder)  Bipolar 1 disorder  Migraines    Past Surgical History:    The patient does not have any pertinent past surgical history.     Family History:    Diabetes  Hypertension    Social History:  Relationship status: Single  Tobacco use: Former  Alcohol use: Yes, rarely.  Drug use: Yes, marijuana daily.   The patient presents with her mother and boyfriend.    Marital Status:  Single     Review of Systems   Constitutional: Negative for appetite change. " "  Gastrointestinal: Positive for abdominal pain, constipation, nausea and vomiting.   Genitourinary: Positive for menstrual problem. Negative for dysuria, hematuria and vaginal discharge.        Positive for mucus is urine   Musculoskeletal: Negative for back pain.   All other systems reviewed and are negative.    Physical Exam     Patient Vitals for the past 24 hrs:   BP Heart Rate Resp SpO2 Height Weight   01/16/19 1753 (!) 139/100 82 18 94 % 1.702 m (5' 7\") 102.1 kg (225 lb)        Physical Exam  General: Well-developed. Obese. Well appearing young  woman. Cooperative.  Head:  Atraumatic.  Eyes:  Conjunctivae, lids, and sclerae are normal.  ENT:    Normal nose. Moist mucous membranes.  Neck:  Supple. Normal range of motion.  CV:  Regular rate and rhythm. Normal heart sounds with no murmurs, rubs, or gallops detected.  Resp:  No respiratory distress. Clear to auscultation bilaterally without decreased breath sounds, wheezing, rales, or rhonchi.  GI:  Soft. Non-distended. Tenderness in the right inferior pelvis. No tenderness at McBurney's point. No rebound, guarding, or rigidity.    MS:  Normal ROM.   Skin:  Warm. Non-diaphoretic. No pallor.  Neuro:  Awake. A&Ox3. Normal strength.  Psych: Normal mood and affect. Normal speech.  Vitals reviewed.       Emergency Department Course     Imaging:  Radiology findings were communicated with the patient and family who voiced understanding of the findings.  US OB < 14 Weeks Single   Preliminary Result   IMPRESSION:    1. Single live intrauterine pregnancy at 10 weeks 0 days estimated   gestational age based upon crown-rump length measurement on this   study. The estimated date of delivery is 8/14/2019.   2. Tiny subchorionic hemorrhage.   3. Unremarkable appearance of the right ovary.   4. No free fluid in the pelvis.        Laboratory:  Laboratory findings were communicated with the patient and family who voiced understanding of the findings.    CBC: AWNL (WBC " "7.4, HGB 14.3, )  CMP: BUN: 5(L) o/w WNL (Creatinine 0.60)   HCG Quantitative Urine: 75,728(H)   ISTAT HCG quantitative pregnancy POCT: >2,000.0(H)  UA with Microscopic: Bacteria: moderate(A), Squamous epithelial: 3(H), Mucous: present(A) o/w WNL     Interventions:   Normal Saline 1000 mL IV    Tylenol 650 mg Oral    Zofran 4 mg IV    Emergency Department Course:  Nursing notes and vitals reviewed.   IV was inserted and blood was drawn for laboratory testing, results above.   The patient was sent for a US while in the emergency department, results above.    The patient provided a urine sample here in the emergency department. This was sent for laboratory testing, findings above.      I performed an exam of the patient as documented above.    I updated the patient.    I reassessed the patient. She states her pain is \"dulled down\" but she and boyfriend would like her to get stronger pain medications. I informed her that the pain medications would not be safe during pregnancy. Her boyfriend then states that she \"will have to resort to weed.\"     Findings and plan explained to the patient, significant other, and mother. She was discharged home with instructions regarding supportive care, medications, and reasons to return. The importance of close follow-up was reviewed.      I personally reviewed the laboratory results with the patient and significant other and mother and answered all related questions prior to discharge.    Impression & Plan      Medical Decision Making:  Kailey is a 18-year-old  who believes she had a miscarriage last month.  She is presenting with right lower quadrant pain that she describes as stabbing.  She believes there was some streaks of blood in a single episode of emesis.  She has had no vaginal bleeding or discharge and no other concerns.  She appears well on exam and comfortable.  She has tenderness in the low right pelvis without tenderness at McBurney's " "point and no rebound, guarding, or rigidity.  My suspicion for an acute intra-abdominal process is very low as this seems to be a pelvic etiology.  Patient did not ever have follow-up after her last visit for threatened miscarriage. She felt follow up wasn't necessary because she didn't \"feel pregnant.\" As such, pregnancy was repeated and hCG was found to be significantly elevated at 75,728, consistent with continued pregnancy rather than miscarriage. EGA 11w0d by stated LMP.  Fortunately, she has no leukocytosis or anemia and no significant electrolyte abnormalities or LATOSHA.  She has no hepatitis or evidence of biliary obstruction and there is no evidence of urinary tract infection either.  She was sent for ultrasound of the pelvis which reveals a single live IUP at 10 weeks 0 days with a tiny subchorionic hemorrhage and an unremarkable right ovary.  There is no evidence of ovarian torsion or ectopic pregnancy and, again, I have very low suspicion for an intra-abdominal process such as appendicitis given her exam.  Patient's pain may simply be round ligament pain. Pelvic exam can safely be deferred without vaginal bleeding or discharge and completed pelvic ultrasound.  Patient was given IV fluids in addition to Zofran and Tylenol and had improvement, though not resolution, of her pain.  She and her boyfriend are requesting further pain management which I discussed is contraindicated because of her pregnancy. Boyfriend indicates the patient will \"have to resort to weed.\"  I do not believe she needs narcotics as there is no acute process at hand and there is no need for further workup or intervention.  I have recommended continued warm packs or Tylenol.  I have discussed appropriate changes in lifestyle now that she has a confirmed IUP including, but not limited to, cessation of marijuana and alcohol as well as initiation of prenatal vitamins which I prescribed, and cessation of ibuprofen.  She was instructed that " this is not safe in pregnancy.  I recommended she call OB to schedule appointment soon as possible to initiate prenatal care and I have provided return precautions including, but not limited to, vaginal bleeding, severe pain, and fever.  I answered all the patient's, her boyfriend's, and her mother's questions and they verbalized understanding.  Amenable to discharge.    Diagnosis:    ICD-10-CM    1. Abdominal pain during intrauterine pregnancy O99.89     R10.9    2. First trimester pregnancy Z34.91        Disposition:   Discharged home     Discharge Medications:  Current Discharge Medication List      START taking these medications    Details   Prenatal MV-Min-Fe Fum-FA-DHA (PRENATAL 1) 30-0.975-200 MG CAPS Take 1 capsule by mouth daily  Qty: 30 capsule, Refills: 8          Scribe Disclosure:  I, Gilda Sainz, am serving as a scribe at 5:58 PM on 1/16/2019 to document services personally performed by Briseida Flores MD, based on my observations and the provider's statements to me.     Gilda Sainz  1/16/2019   [facility] EMERGENCY DEPARTMENT       Briseida Flores MD  01/21/19 7512

## 2019-01-16 NOTE — ED AVS SNAPSHOT
[facility] Emergency Department  [address]  [facility] [address]  Phone:  [phone]  Fax:  [phone]                                    Kailey Argueta   MRN: [id number]    Department:  [facility] Emergency Department   Date of Visit:  1/16/2019           After Visit Summary Signature Page    I have received my discharge instructions, and my questions have been answered. I have discussed any challenges I see with this plan with the nurse or doctor.    ..........................................................................................................................................  Patient/Patient Representative Signature      ..........................................................................................................................................  Patient Representative Print Name and Relationship to Patient    ..................................................               ................................................  Date                                   Time    ..........................................................................................................................................  Reviewed by Signature/Title    ...................................................              ..............................................  Date                                               Time          22EPIC Rev 08/18

## 2019-01-16 NOTE — ED TRIAGE NOTES
"Yesterday woke with \"excrutiating, stabbing pain on lower right side\", took a total of 10 200mg ibuprofen t/o the day yesterday with no help.  Awoke today with same pain. Chronically takes ibuprofen of 2 or more a day. This morning emesis with bright pink blood specks.  Only one emesis.  Denies nausea. Pain rating 8.5/10 at this time to RLQ abd.  ABCD's intact; A/O x 4.   "

## 2019-01-17 NOTE — DISCHARGE INSTRUCTIONS
Tylenol as needed for pain. Other options not safe.  No drugs or alcohol.  Warm pack to abdomen as needed.  Start prenatal vitamins.  Call OB to schedule appointment as soon as possible.  Return with vaginal bleeding, severe pain, fever, or any other issues.

## 2019-01-21 ASSESSMENT — ENCOUNTER SYMPTOMS: VOMITING: 1

## 2019-02-06 ENCOUNTER — TELEPHONE (OUTPATIENT)
Dept: FAMILY MEDICINE | Facility: CLINIC | Age: 19
End: 2019-02-06

## 2019-02-06 NOTE — TELEPHONE ENCOUNTER
"Patient called stating that she had an  on 19 and now she is having \"extreme\" cramping in her pelvic area. Pain is in the front and the back. She is actively bleeding without clots. Stated pain is an 8 and goes up to an 11. Pain woke her up out of bed. Has been about an hour. She has taken Ibuprofen, heating pad and CBD oil. Patient did not want nurse to go over home care instruction. Patient stated \"I just want to know if it's normal?\"    Appears patient has been to the ED multiple times in past year for Abdominal pain and had similar symptoms.     Requested patient come into the clinic today for a pelvic exam. Patient stated she is \"too busy\" to come in today and will not be able to make it in until Saturday. Stated \"I have a lot of things to do.\"     Patient was tearful at the beginning of call by the end of call was calm and appeared unconcerned.     Appointment scheduled for 19.     Pt expressed understanding and acceptance of the plan.  Pt had no further questions at this time.  Advised can call back to clinic at any time with concerns.     Tiana Navarrete RN Flex    "

## 2019-04-02 ENCOUNTER — HOSPITAL ENCOUNTER (EMERGENCY)
Facility: CLINIC | Age: 19
Discharge: HOME | End: 2019-04-02
Attending: EMERGENCY MEDICINE | Admitting: EMERGENCY MEDICINE
Payer: COMMERCIAL

## 2019-04-02 ENCOUNTER — APPOINTMENT (OUTPATIENT)
Dept: ULTRASOUND IMAGING | Facility: CLINIC | Age: 19
End: 2019-04-02
Attending: EMERGENCY MEDICINE
Payer: COMMERCIAL

## 2019-04-02 VITALS
TEMPERATURE: 98.2 F | RESPIRATION RATE: 16 BRPM | DIASTOLIC BLOOD PRESSURE: 67 MMHG | SYSTOLIC BLOOD PRESSURE: 110 MMHG | HEART RATE: 71 BPM | OXYGEN SATURATION: 98 %

## 2019-04-02 DIAGNOSIS — R93.89 INCREASED ENDOMETRIAL STRIPE THICKNESS: ICD-10-CM

## 2019-04-02 DIAGNOSIS — O03.4 RETAINED PRODUCTS OF CONCEPTION FOLLOWING ABORTION: ICD-10-CM

## 2019-04-02 DIAGNOSIS — N93.9 ABNORMAL UTERINE BLEEDING: ICD-10-CM

## 2019-04-02 LAB
ABO + RH BLD: NORMAL
ABO + RH BLD: NORMAL
ALBUMIN UR-MCNC: NEGATIVE MG/DL
APPEARANCE UR: CLEAR
B-HCG FREE SERPL-ACNC: 23.5 IU/L
B-HCG SERPL-ACNC: 19 IU/L (ref 0–5)
BASOPHILS # BLD AUTO: 0 10E9/L (ref 0–0.2)
BASOPHILS NFR BLD AUTO: 0.2 %
BILIRUB UR QL STRIP: NEGATIVE
COLOR UR AUTO: YELLOW
DIFFERENTIAL METHOD BLD: NORMAL
EOSINOPHIL # BLD AUTO: 0.4 10E9/L (ref 0–0.7)
EOSINOPHIL NFR BLD AUTO: 3.9 %
ERYTHROCYTE [DISTWIDTH] IN BLOOD BY AUTOMATED COUNT: 12.5 % (ref 10–15)
GLUCOSE UR STRIP-MCNC: NEGATIVE MG/DL
HCT VFR BLD AUTO: 43.5 % (ref 35–47)
HGB BLD-MCNC: 14.5 G/DL (ref 11.7–15.7)
HGB UR QL STRIP: ABNORMAL
IMM GRANULOCYTES # BLD: 0 10E9/L (ref 0–0.4)
IMM GRANULOCYTES NFR BLD: 0.2 %
KETONES UR STRIP-MCNC: NEGATIVE MG/DL
LEUKOCYTE ESTERASE UR QL STRIP: NEGATIVE
LYMPHOCYTES # BLD AUTO: 3.7 10E9/L (ref 0.8–5.3)
LYMPHOCYTES NFR BLD AUTO: 38.8 %
MCH RBC QN AUTO: 29.4 PG (ref 26.5–33)
MCHC RBC AUTO-ENTMCNC: 33.3 G/DL (ref 31.5–36.5)
MCV RBC AUTO: 88 FL (ref 78–100)
MONOCYTES # BLD AUTO: 0.5 10E9/L (ref 0–1.3)
MONOCYTES NFR BLD AUTO: 5.4 %
MUCOUS THREADS #/AREA URNS LPF: PRESENT /LPF
NEUTROPHILS # BLD AUTO: 4.9 10E9/L (ref 1.6–8.3)
NEUTROPHILS NFR BLD AUTO: 51.5 %
NITRATE UR QL: NEGATIVE
NRBC # BLD AUTO: 0 10*3/UL
NRBC BLD AUTO-RTO: 0 /100
PH UR STRIP: 6 PH (ref 5–7)
PLATELET # BLD AUTO: 257 10E9/L (ref 150–450)
RBC # BLD AUTO: 4.94 10E12/L (ref 3.8–5.2)
RBC #/AREA URNS AUTO: 2 /HPF (ref 0–2)
SOURCE: ABNORMAL
SP GR UR STRIP: 1.02 (ref 1–1.03)
SPECIMEN EXP DATE BLD: NORMAL
SPECIMEN SOURCE: ABNORMAL
SQUAMOUS #/AREA URNS AUTO: 2 /HPF (ref 0–1)
UROBILINOGEN UR STRIP-MCNC: NORMAL MG/DL (ref 0–2)
WBC # BLD AUTO: 9.6 10E9/L (ref 4–11)
WBC #/AREA URNS AUTO: 1 /HPF (ref 0–5)
WET PREP SPEC: ABNORMAL

## 2019-04-02 PROCEDURE — 93976 VASCULAR STUDY: CPT

## 2019-04-02 PROCEDURE — 86901 BLOOD TYPING SEROLOGIC RH(D): CPT | Performed by: EMERGENCY MEDICINE

## 2019-04-02 PROCEDURE — 87491 CHLMYD TRACH DNA AMP PROBE: CPT | Performed by: EMERGENCY MEDICINE

## 2019-04-02 PROCEDURE — 81001 URINALYSIS AUTO W/SCOPE: CPT | Performed by: EMERGENCY MEDICINE

## 2019-04-02 PROCEDURE — 84702 CHORIONIC GONADOTROPIN TEST: CPT

## 2019-04-02 PROCEDURE — 87591 N.GONORRHOEAE DNA AMP PROB: CPT | Performed by: EMERGENCY MEDICINE

## 2019-04-02 PROCEDURE — 84702 CHORIONIC GONADOTROPIN TEST: CPT | Performed by: EMERGENCY MEDICINE

## 2019-04-02 PROCEDURE — 25000128 H RX IP 250 OP 636: Performed by: EMERGENCY MEDICINE

## 2019-04-02 PROCEDURE — 87210 SMEAR WET MOUNT SALINE/INK: CPT | Performed by: EMERGENCY MEDICINE

## 2019-04-02 PROCEDURE — 96361 HYDRATE IV INFUSION ADD-ON: CPT

## 2019-04-02 PROCEDURE — 85025 COMPLETE CBC W/AUTO DIFF WBC: CPT | Performed by: EMERGENCY MEDICINE

## 2019-04-02 PROCEDURE — 99284 EMERGENCY DEPT VISIT MOD MDM: CPT | Mod: 25

## 2019-04-02 PROCEDURE — 96360 HYDRATION IV INFUSION INIT: CPT

## 2019-04-02 RX ADMIN — SODIUM CHLORIDE 1000 ML: 9 INJECTION, SOLUTION INTRAVENOUS at 15:56

## 2019-04-02 ASSESSMENT — ENCOUNTER SYMPTOMS
DIZZINESS: 0
LIGHT-HEADEDNESS: 1
HEMATURIA: 0
FREQUENCY: 0
DYSURIA: 0
SHORTNESS OF BREATH: 0

## 2019-04-02 NOTE — ED PROVIDER NOTES
History     Chief Complaint:  Vaginal Bleeding    HPI   Kailey Argueta is a 19 year old female with a history of migraines who presents to the ED for evaluation of vaginal bleeding. The patient reports that she had an  with subsequent IUD placement back on 19, and there were no known complications with the surgery. She has had persistent vaginal bleeding since then, in addition to a sharp, intermittent pelvic pain, so she presented to the ED for evaluation. Here in the ED, she states that she had greater pelvic pain on the left than the right. She does state she has a history of ovarian cyst rupture. She has been using light pads, and she changes these every 1.5 hours. She has had some recent lightheadedness, particularly with standing up. She has had headaches, though she has a history of this and these feel like her typical headaches. Her pain is somewhat alleviated curled up into a fetal position. Her last bowel movement was today, and she states she has had some hard stools intermittently. She has had urinary frequency, though denies other urinary changes. She denies any dizziness, diarrhea, chest pain, shortness of breath, leg swelling, or other symptoms or concerns. Of note, she states the string from her IUD is still in place.    Allergies:  NKDA     Medications:    Flonase  Lamictal    Past Medical History:    ADHD  Bipolar disorder  Migraines  Panic attack    Past Surgical History:    The patient does not have any pertinent past surgical history.    Family History:    HTN  Diabetes    Social History:  Marital Status:  Single [1]  Former Smoker  Alcohol use: yes  Drug use: marijuana     Review of Systems   Respiratory: Negative for shortness of breath.    Cardiovascular: Negative for chest pain and leg swelling.   Genitourinary: Positive for pelvic pain and vaginal bleeding. Negative for dysuria, frequency, hematuria and urgency.   Neurological: Positive for light-headedness. Negative for  dizziness.   All other systems reviewed and are negative.      Physical Exam     Patient Vitals for the past 24 hrs:   BP Temp Pulse Resp SpO2   04/02/19 1730 110/67 -- 71 -- 98 %   04/02/19 1704 -- -- -- -- 98 %   04/02/19 1703 132/80 -- 91 -- --   04/02/19 1552 120/84 -- 67 -- 99 %   04/02/19 1430 (!) 155/100 98.2  F (36.8  C) 96 16 97 %     Physical Exam  Vital signs and nursing notes reviewed.     Constitutional: laying on gurney appears comfortable  HENT: Oropharynx is clear and moist  Eyes: Conjunctivae are normal bilaterally. Pupils equal  Neck: normal range of motion  Cardiovascular: Normal rate, regular rhythm, normal heart sounds.   Pulmonary/Chest: Effort normal and breath sounds normal. No respiratory distress.   Abdominal: Soft. Bowel sounds are normal. No tenderness to palpation. No rebound or guarding.   Pelvic: IUD string visualized. No cervical motion tenderness. No active bleeding. No discharge.   Musculoskeletal: No joint swelling or edema.   Neurological: Alert and oriented. No focal weakness  Skin: Skin is warm and dry. No rash noted.   Psych: normal affect    Emergency Department Course     Imaging:  Radiographic findings were communicated with the patient who voiced understanding of the findings.  US Pelvic, Complete w Transvaginal & Abd/Pel Duplex Limited:  1. An intrauterine device is present within an atypical location low position in the uterus with its stem in the endocervical canal.  2. The endometrial stripe is relatively prominent in thickness in the uterine fundus, measuring 0.8 cm in thickness. There is no convincing evidence of retained products of conception, but retained products of conception cannot be excluded with this thickness of endometrium.  3. Otherwise, unremarkable appearance of uterus and ovaries. Blood flow is visualized in both ovaries.   4. No free fluid in the pelvis, as per radiology.     Laboratory:  CBC: WBC: 9.6, HGB: 14.5, PLT: 257    UA with Microscopic: blood  small (A), SE 2 (H), mucous present (A), o/w WNL  Wet prep: clue cells seen (A), otherwise negative  Chlamydia trachomatis: pending  Neisseria gonorrhoea: pending  ISTAT HC.5 (H)  HC (H)  Rh: O+    Interventions:  1556 NS 1L IV    Emergency Department Course:  Nursing notes and vitals reviewed. (2879) I performed an exam of the patient as documented above.     IV inserted. Medicine administered as documented above. Blood drawn. This was sent to the lab for further testing, results above.    The patient was sent for a Pelvic US while in the emergency department, findings above.     (1243) I consulted with Dr. Gonzales, [facility], regarding the patient's history and presentation here in the emergency department.    (7965) I rechecked the patient and discussed the results of her workup thus far.     Findings and plan explained to the Patient. Patient discharged home with instructions regarding supportive care, medications, and reasons to return. The importance of close follow-up was reviewed.    I personally reviewed the laboratory results with the Patient and answered all related questions prior to discharge.    Impression & Plan     Medical Decision Making:  Kailey Argueta is a pleasant 19 year old female who presents with fairly persistent vaginal bleeding since she had her IUD placed after having suction procedure for an  at [facility] on 19. She denies having any significant abdominal pain, fevers, chills, etc. On examination, her pelvic exam did not show anything to suggest PID. She had no active bleeding, and IUD appeared to be in position. Ultrasound did not show any evidence of ovarian pathology, though did show evidence of what could represent small retained products. She also had very minimally elevated HCG, which is odd, suggesting she does in fact have some retained products. Her labs are otherwise unremarkable. I discussed the case with Dr. Gonzales from OBGYN. He agreed that the  patient should be seen in their clinic to discuss a D&C to ensure she has no persistent products remaining in the uterus. I discussed this with her, she is in agreement with the plan, and will contact Dr. Gonzales's office tomorrow for close follow up. I did discuss with her if she has significant worsening heavy bleeding, fever, chills, significant abdominal pain he is to return here for evaluation. The patient understands the plan and was discharged home.     Diagnosis:    ICD-10-CM    1. Abnormal uterine bleeding N93.9    2. Increased endometrial stripe thickness R93.89    3. Retained products of conception following  O03.4      Disposition:  discharged to home    Scribe Disclosure:  I, Zoila Santos, am serving as a scribe on 2019 at 3:10 PM to personally document services performed by Jeremi Jackson MD based on my observations and the provider's statements to me.     Zoila Santos  2019   [facility] EMERGENCY DEPARTMENT       Jeremi Jackson MD  19

## 2019-04-02 NOTE — ED AVS SNAPSHOT
[facility] Emergency Department  [address]  [facility] [address]  Phone:  [phone]  Fax:  [phone]                                    Kailey Argueta   MRN: [id number]    Department:  [facility] Emergency Department   Date of Visit:  4/2/2019           After Visit Summary Signature Page    I have received my discharge instructions, and my questions have been answered. I have discussed any challenges I see with this plan with the nurse or doctor.    ..........................................................................................................................................  Patient/Patient Representative Signature      ..........................................................................................................................................  Patient Representative Print Name and Relationship to Patient    ..................................................               ................................................  Date                                   Time    ..........................................................................................................................................  Reviewed by Signature/Title    ...................................................              ..............................................  Date                                               Time          22EPIC Rev 08/18

## 2019-04-02 NOTE — ED TRIAGE NOTES
Patient presents to the ED with ongoing vaginal bleeding. Reports has been having bleeding since having an  and an IUD placed . Reports is alternating between mild spotting and some heavier bleeding. Also reports some cramping.

## 2019-04-08 ENCOUNTER — OFFICE VISIT (OUTPATIENT)
Dept: OBGYN | Facility: CLINIC | Age: 19
End: 2019-04-08
Payer: COMMERCIAL

## 2019-04-08 VITALS
BODY MASS INDEX: 29.66 KG/M2 | HEIGHT: 67 IN | SYSTOLIC BLOOD PRESSURE: 120 MMHG | WEIGHT: 189 LBS | DIASTOLIC BLOOD PRESSURE: 80 MMHG

## 2019-04-08 DIAGNOSIS — Z98.890 HISTORY OF ELECTIVE ABORTION: ICD-10-CM

## 2019-04-08 DIAGNOSIS — Z97.5 IUD (INTRAUTERINE DEVICE) IN PLACE: ICD-10-CM

## 2019-04-08 DIAGNOSIS — N92.6 IRREGULAR BLEEDING: ICD-10-CM

## 2019-04-08 DIAGNOSIS — F31.9 BIPOLAR DEPRESSION (H): Primary | ICD-10-CM

## 2019-04-08 PROCEDURE — 84702 CHORIONIC GONADOTROPIN TEST: CPT | Performed by: OBSTETRICS & GYNECOLOGY

## 2019-04-08 PROCEDURE — 36415 COLL VENOUS BLD VENIPUNCTURE: CPT | Performed by: OBSTETRICS & GYNECOLOGY

## 2019-04-08 PROCEDURE — 99203 OFFICE O/P NEW LOW 30 MIN: CPT | Performed by: OBSTETRICS & GYNECOLOGY

## 2019-04-08 ASSESSMENT — MIFFLIN-ST. JEOR: SCORE: 1654.93

## 2019-04-08 NOTE — PROGRESS NOTES
SUBJECTIVE:                                                   Kailey Argueta is a 19 year old female who presents to clinic today for the following health issue(s):  Patient presents with:  Hospital F/U: patient states last night she had heavy bleeding and large clotting chunks the size of a baseball. had an  in , IUD Lamonte placed at the same time. was seen in ER a few days ago because because of extreme sharp stabbing pain in her right abdomin/ovary. patient also c/o weight loss. 10-15 lbs in the last couple of months        HPI:  NP    Had 16wk AB at [facility] in January. Had lamonte placed at same time. Was having constant spotting. Started having pain/pulling/stabbing in left side mostly starting 1wk ago. Went into ER. Epic records reviewed. Had US, showed IUD low in CAROLIN with 1cm ES concerning for retained products. Hgb wnl.  Uses menstrual cup for periods. Also HCG quant elevated yet at 23.5.  Last night had heavy bleeding and baseball sized clot that passed.    BF states he can feel a hard thing during sex, like IUD, not just strings.     Greyish white discharge since .      Hx bipolar, depression/anxiety. Does not have a therapist right now, but is looking for one. Was with one since she was in grade school but thinks it stopped being beneficial, got too familiar and the provider wasn't helpful any more.  Was on lithium until 1yr ago, came off and has been trying to manage on her own.     Review of PMH, SocHx, SurHx, FHx, medications completed. Epic updated.      Patient's last menstrual period was 02/10/2019 (exact date)..   Patient is sexually active, .  Using IUD for contraception.    reports that she has been smoking cigarettes.  She has never used smokeless tobacco.  Tobacco Cessation Action Plan: Self help information given to patient  STD testing offered?  Declined    Health maintenance updated:  yes    Today's PHQ-2 Score:   PHQ-2 (  [employer]) 2018   Q1: Little interest or  pleasure in doing things 1   Q2: Feeling down, depressed or hopeless 1   PHQ-2 Score 2     Today's PHQ-9 Score:   PHQ-9 SCORE 6/24/2016   PHQ-9 Total Score 10     Today's JOE-7 Score:   JOE-7 SCORE 5/31/2016   Total Score 15       Problem list and histories reviewed & adjusted, as indicated.  Additional history: as documented.    Patient Active Problem List   Diagnosis     CARDIOVASCULAR SCREENING; LDL GOAL LESS THAN 130     Panic attack     Migraine without status migrainosus, not intractable, unspecified migraine type     Past Surgical History:   Procedure Laterality Date     AS INDUCED ABORTN BY D&C  01/2019    16 weeks. [facility]      Social History     Tobacco Use     Smoking status: Current Every Day Smoker     Types: Cigarettes     Smokeless tobacco: Never Used     Tobacco comment: 5 cigs per day   Substance Use Topics     Alcohol use: Yes     Alcohol/week: 0.0 oz     Comment: once a month      Problem (# of Occurrences) Relation (Name,Age of Onset)    Diabetes (2) Mother, Father    Hypertension (1) Mother    Skin Cancer (1) Mother            Current Outpatient Medications   Medication Sig     levonorgestrel (MARY BETH) 13.5 MG IUD 1 each by Intrauterine route once     No current facility-administered medications for this visit.      No Known Allergies    ROS:  12 point review of systems negative other than symptoms noted below.  Constitutional: Fatigue, Loss of Appetite and Weight Loss  Eyes: Vision Loss  Head: Earache, Nasal Congestion, Ringing and Sore Throat  Cardiovascular: Chest Pain and Lightheadedness  Respiratory: Cough and Wheezing  Gastrointestinal: Abdominal Pain, Bloating and Diarrhea  Genitourinary: Cramps, Heavy Bleeding with Period, Hot Flashes, Incontinence, Irregular Menses, Night Sweats, Spotting and Vaginal Discharge  Skin: Acne and Skin Dryness  Neurologic: Dizziness and Headaches  Musculoskeletal: Height Loss and Muscle Cramps  Endocrine: Excessive Thirst  Psychiatric: Anxiety, Depression,  "Difficulty Sleeping, Vizcarra and Significant Memory Loss  Blood/Lymph: Lymph Node Enlargement    OBJECTIVE:     /80   Ht 1.702 m (5' 7\")   Wt 85.7 kg (189 lb)   LMP 02/10/2019 (Exact Date)   BMI 29.60 kg/m    Body mass index is 29.6 kg/m .    Exam:  Constitutional:  Appearance: Well nourished, well developed alert, in no acute distress  Chest:  Respiratory Effort:  Breathing unlabored  Gastrointestinal: soft, nondistended, no masses, skin wnl, +LLQ ttp, no rebound/guard  Lymphatic: Lymph Nodes:  No other lymphadenopathy present  Skin:General Inspection:  No rashes present, no lesions present, no areas of discoloration  Neurologic/Psychiatric:  Mental Status:  Oriented X3        ASSESSMENT/PLAN:                                                        ICD-10-CM    1. Bipolar depression (H) F31.30 MENTAL HEALTH REFERRAL  - Adult; Outpatient Treatment; Individual/Couples/Family/Group Therapy/Health Psychology; Other: [facility] Providers [phone]; We will contact you to schedule the appointment or please call with any questi...   2. History of elective  Z98.890 HCG Quantitative Pregnancy, Blood ([id number])   3. Irregular bleeding N92.6 US Transvaginal Non OB     HCG Quantitative Pregnancy, Blood ([id number])   4. IUD (intrauterine device) in place Z97.5 US Transvaginal Non OB       -Hx EAB with + quant and US with concerns for RPOC. Passed large clot yesterday. Will repeat US, if still thickened, will discuss D&C. Repeat quant today.   Rec no sex until we figure out what is causing the continued elevation of HCG. Discussed could be new pregnancy.  -IUD mispositioned by ER US. Discussed lack of efficacy if not properly located, may also be contributing to cramping/pain. Bleeding irregularity not abnormal with IUD, berna Liza. May also be related to RPOC. Discussed removal of IUD. Pt desires replacement. Rec Kyleena for better bleeding profile and up to 5 yrs of efficacy. Pt will return for pelvic " US and IUD exchange. Discussed pre procedure NSAID/tylenol.   Additionally is using menstrual cup. Discussed this may contribute to dislodging an IUD. Would rec she avoid use at least for first 1-2 mo of IUD placement if not indefinitely.   Questions answered.   -Bipolar, depression, anxiety, ADHD. Referral to mental health for therapist. No red flags currently. Is off meds x 1 yr.     Sonia Benito Masters, [facility] [place]

## 2019-04-09 ENCOUNTER — TELEPHONE (OUTPATIENT)
Dept: FAMILY MEDICINE | Facility: CLINIC | Age: 19
End: 2019-04-09

## 2019-04-09 ENCOUNTER — TELEPHONE (OUTPATIENT)
Dept: OBGYN | Facility: CLINIC | Age: 19
End: 2019-04-09

## 2019-04-09 LAB — B-HCG SERPL-ACNC: 8 IU/L (ref 0–5)

## 2019-04-09 NOTE — TELEPHONE ENCOUNTER
"Pt calling having severe abd pain-pt is crying   Lookout fine yesterday is now feels like \"my entire uterus is madan in on itself\"  Pt also having heavy bleeding and is going through \"5-6 tampons in a couple hours\"    Advised ED for evaluation-pt agrees to go. Does have a f/u appt scheduled with Dr. Garay for IUD replacement see OV 4/8/19    Janee Xavier RN on 4/9/2019 at 3:39 PM      "

## 2019-04-09 NOTE — TELEPHONE ENCOUNTER
Pt calls, saw OB yesterday, c/o pelvic pain due to IUD, see past ER notes, recommend call OB now, will defer to them, pt agrees  Pia Santiago RN, BSN  Message handled by Nurse Triage.

## 2019-04-10 ENCOUNTER — TELEPHONE (OUTPATIENT)
Dept: OBGYN | Facility: CLINIC | Age: 19
End: 2019-04-10

## 2019-04-10 ENCOUNTER — HOSPITAL ENCOUNTER (EMERGENCY)
Facility: CLINIC | Age: 19
Discharge: HOME | End: 2019-04-10
Attending: EMERGENCY MEDICINE | Admitting: EMERGENCY MEDICINE
Payer: COMMERCIAL

## 2019-04-10 VITALS
DIASTOLIC BLOOD PRESSURE: 82 MMHG | WEIGHT: 189 LBS | RESPIRATION RATE: 16 BRPM | SYSTOLIC BLOOD PRESSURE: 122 MMHG | HEART RATE: 91 BPM | BODY MASS INDEX: 29.6 KG/M2 | TEMPERATURE: 97.7 F | OXYGEN SATURATION: 99 %

## 2019-04-10 DIAGNOSIS — T83.32XA MALPOSITIONED IUD, INITIAL ENCOUNTER: ICD-10-CM

## 2019-04-10 PROCEDURE — 58301 REMOVE INTRAUTERINE DEVICE: CPT

## 2019-04-10 PROCEDURE — 99283 EMERGENCY DEPT VISIT LOW MDM: CPT | Mod: 25

## 2019-04-10 ASSESSMENT — ENCOUNTER SYMPTOMS: ABDOMINAL PAIN: 1

## 2019-04-10 NOTE — DISCHARGE INSTRUCTIONS
Return to the Emergency Room or see your gynecologist if you have worsening pelvic pain, fever more than 101, or other new symptoms.

## 2019-04-10 NOTE — ED PROVIDER NOTES
History     Chief Complaint:  Abdominal Pain    HPI   Kailey Argueta is a 19 year old female who presents with concerns that her IUD strings are hanging out. She states that she noticed this 10 minutes before coming in. The patient states that she was taking out her tampon and then looked down and noticed that her IUD strings were hanging out. She currently has a Liza IUD in place.     She notes that she had some abdominal discomfort earlier in the day and also adds to the history by mentioning that she was informed that there IUD was placed low. Otherwise she denies any other medical concerns.     Allergies:  No Known Allergies     Medications:    Liza      Past Medical History:    ADHD (attention deficit hyperactivity disorder)   Anxiety   Bipolar 1 disorder (H)   Headache   Hypertension   IUD contraception     Past Surgical History:    Induced  by D&C     Family History:    Hypertension   Skin Cancer   Diabetes     Social History:  The patient  reports that she has been smoking cigarettes.  She has never used smokeless tobacco. She reports that she drinks alcohol. She reports that she has current or past drug history. Drug: Marijuana. Frequency: 7.00 times per week.      Review of Systems   Gastrointestinal: Positive for abdominal pain.   All other systems reviewed and are negative.      Physical Exam     Vital signs  Patient Vitals for the past 24 hrs:   BP Temp Temp src Pulse Resp SpO2 Weight   04/10/19 0409 122/82 97.7  F (36.5  C) Temporal 91 16 99 % 85.7 kg (189 lb)          Physical Exam    General: Patient is alert and interactive when I enter the room.  Appears in      Distress with the pain.   Head:  The scalp, face, and head appear normal  Eyes:  The pupils are equal, round, and reactive to light    Conjunctivae and sclerae are normal  ENT:    External acoustic canals are normal    The oropharynx is normal without erythema.     Uvula is in the midline  Neck:  Normal range of  motion  CV:  Regular rate. S1/S2. No murmurs.   Resp:  Lungs are clear without wheezes or rales. No distress  GI (first exam):    Abdomen is soft, no rigidity.    No distension.     She is minimally tender to palpation suprapubic quadrant.     There is no rebound or guarding.     Bowel sounds are normal.       No hernias or bruising are noted in detailed exam.     No CVA tenderness.   GI (second exam):    There is minimal tenderness to palpation  MS:  Normal tone. Joints grossly normal without effusions.     No asymmetric leg swelling, calf or thigh tenderness.      Normal motor assessment of all extremities.  Skin:  No rash or lesions noted. Normal capillary refill noted  Neuro:  Speech is normal and fluent. Face is symmetric.     Moving all extremities well.   Psych: Awake. Alert.  Normal affect.  Appropriate interactions.  Lymph: No anterior cervical lymphadenopathy noted      Emergency Department Course   Procedures:    PROCEDURE: Removal of foreign body from cervix/IUD malpositioned  CONSENT: Verbal  INDICATION:  Malpositioned IUD  POST-PROCEDURE DIAGNOSIS: successful removal of IUD cervical os   PROVIDER: Didier Grider MD  DESCRIPTION OF PROCEDURE:    Informed verbal consent. Site was correctly identified.  The IUD was identified in the cervix and easily removed with gentle pressure on the strings downward. The IUD was intact after removal. No complications.     Emergency Department Course:  Past medical records, nursing notes, and vitals reviewed.  0424: I performed an exam of the patient as documented above.    I removed the IUD as detailed above.     Clinical findings and plan explained to the Patient. Patient discharged home with instructions regarding supportive care, medications, and reasons to return as well as the importance of close follow-up were reviewed.         Impression & Plan    Medical Decision Makin yo female w/ malpositioned IUD.  No signs of PID or perforation.  IUD removed  successfully.   See Gyn in follow up.  No need for advanced imaging with benign exam and hx.     Diagnosis:    ICD-10-CM    1. Malpositioned IUD, initial encounter T83.32XA        Disposition:  discharged to home    Melanie GUEVARA am serving as a scribe at 4:36 AM on 4/10/2019 to document services personally performed by Didier Grider MD based on my observations and the provider's statements to me.    [facility] EMERGENCY DEPARTMENT       Didier Grider MD  04/10/19 0544

## 2019-04-10 NOTE — ED TRIAGE NOTES
Pt in with concern that her IUD is not in place properly. In addition pt reports abdominal pain on the L side.

## 2019-04-10 NOTE — TELEPHONE ENCOUNTER
Seen in ED for IUD removal. IUD fell out. Pt has an appointment schedule to have IUD inserted on 4/25/19. Wanted to know if she should have the US Dr. Garay recommended. Informed she should. Pt has us scheduled.

## 2019-04-10 NOTE — ED AVS SNAPSHOT
[facility] Emergency Department  [address]  [facility] [address]  Phone:  [phone]  Fax:  [phone]                                    Kailey Argueta   MRN: [id number]    Department:  [facility] Emergency Department   Date of Visit:  4/10/2019           After Visit Summary Signature Page    I have received my discharge instructions, and my questions have been answered. I have discussed any challenges I see with this plan with the nurse or doctor.    ..........................................................................................................................................  Patient/Patient Representative Signature      ..........................................................................................................................................  Patient Representative Print Name and Relationship to Patient    ..................................................               ................................................  Date                                   Time    ..........................................................................................................................................  Reviewed by Signature/Title    ...................................................              ..............................................  Date                                               Time          22EPIC Rev 08/18

## 2019-04-19 ENCOUNTER — HEALTH MAINTENANCE LETTER (OUTPATIENT)
Age: 19
End: 2019-04-19

## 2019-04-25 ENCOUNTER — OFFICE VISIT (OUTPATIENT)
Dept: OBGYN | Facility: CLINIC | Age: 19
End: 2019-04-25
Payer: COMMERCIAL

## 2019-04-25 ENCOUNTER — ANCILLARY PROCEDURE (OUTPATIENT)
Dept: ULTRASOUND IMAGING | Facility: CLINIC | Age: 19
End: 2019-04-25
Attending: OBSTETRICS & GYNECOLOGY
Payer: COMMERCIAL

## 2019-04-25 VITALS — WEIGHT: 188.4 LBS | BODY MASS INDEX: 29.51 KG/M2 | DIASTOLIC BLOOD PRESSURE: 74 MMHG | SYSTOLIC BLOOD PRESSURE: 122 MMHG

## 2019-04-25 DIAGNOSIS — N92.6 IRREGULAR BLEEDING: ICD-10-CM

## 2019-04-25 DIAGNOSIS — N93.8 DUB (DYSFUNCTIONAL UTERINE BLEEDING): Primary | ICD-10-CM

## 2019-04-25 DIAGNOSIS — Z97.5 IUD (INTRAUTERINE DEVICE) IN PLACE: ICD-10-CM

## 2019-04-25 DIAGNOSIS — N84.0 ENDOMETRIAL POLYP: ICD-10-CM

## 2019-04-25 DIAGNOSIS — Z01.812 PRE-PROCEDURE LAB EXAM: ICD-10-CM

## 2019-04-25 LAB — HCG UR QL: NEGATIVE

## 2019-04-25 PROCEDURE — 76830 TRANSVAGINAL US NON-OB: CPT | Performed by: OBSTETRICS & GYNECOLOGY

## 2019-04-25 PROCEDURE — 99213 OFFICE O/P EST LOW 20 MIN: CPT | Performed by: OBSTETRICS & GYNECOLOGY

## 2019-04-25 PROCEDURE — 81025 URINE PREGNANCY TEST: CPT | Performed by: OBSTETRICS & GYNECOLOGY

## 2019-04-25 RX ORDER — ALPRAZOLAM 0.5 MG
.5-1 TABLET ORAL ONCE
Qty: 2 TABLET | Refills: 0 | Status: SHIPPED | OUTPATIENT
Start: 2019-04-25 | End: 2019-05-15

## 2019-04-25 RX ORDER — HYDROCODONE BITARTRATE AND ACETAMINOPHEN 5; 325 MG/1; MG/1
1-2 TABLET ORAL ONCE
Qty: 2 TABLET | Refills: 0 | Status: SHIPPED | OUTPATIENT
Start: 2019-04-25 | End: 2019-05-15

## 2019-04-25 NOTE — PROGRESS NOTES
Please schedule for surgery:    Patient Name:  Kailey Argueta ([id number]).  :  2000      Physician requests assist from:  None  Requested Dates:  19  Schedule based on:  above  Amount of time needed for the procedure:  1hr   Expected time off from work:  1-2d  Surgeon:  Sonia Garay, DO  Surgery permit to read:  Hysteroscopy, polypectomy, kyleena IUD placement  Preop Needed:  No  Location for surgery to performed:   In Clinic  Anesthesia:  Oral meds, local   No Known Allergies  DIAGNOSIS:  DUB, endometrial polyp    Special instructions:  none  Vendor Rep:  none  Meds Needed: Vicodin: 1 hour prior to procedure, Quantity 4, XANAX: 0.5mg 1 hour prior to procedure and TORADOL: 60mg IM 30-45 minutes before procedure        SUBJECTIVE:                                                   Kailey Argueta is a 19 year old female who presents to clinic today for the following health issue(s):  Patient presents with:  IUD: Insertion      HPI:  Here with  for IUD placement and US f/u.  Seen in ER 2 d after last visit for IUD expulsion. IUD removed.    LMP 4/10/19.  No bleeding since IUD came out.     HCG negative today.    Review of PMH, SocHx, SurHx, FHx, medications completed. Epic updated.      Patient's last menstrual period was 04/10/2019 (approximate)..   Patient is sexually active, .  Using none for contraception.    reports that she has been smoking cigarettes.  She has never used smokeless tobacco.        Today's PHQ-2 Score:   PHQ-2 (  [employer]) 2018   Q1: Little interest or pleasure in doing things 1   Q2: Feeling down, depressed or hopeless 1   PHQ-2 Score 2     Today's PHQ-9 Score:   PHQ-9 SCORE 2016   PHQ-9 Total Score 10     Today's JOE-7 Score:   JOE-7 SCORE 2016   Total Score 15       Problem list and histories reviewed & adjusted, as indicated.  Additional history: as documented.    Patient Active Problem List   Diagnosis     CARDIOVASCULAR SCREENING; LDL GOAL LESS  THAN 130     Panic attack     Migraine without status migrainosus, not intractable, unspecified migraine type     Past Surgical History:   Procedure Laterality Date     AS INDUCED ABORTN BY D&C  01/2019    16 weeks. PP      Social History     Tobacco Use     Smoking status: Current Every Day Smoker     Types: Cigarettes     Smokeless tobacco: Never Used     Tobacco comment: 5 cigs per day   Substance Use Topics     Alcohol use: Yes     Alcohol/week: 0.0 oz     Comment: once a month      Problem (# of Occurrences) Relation (Name,Age of Onset)    Diabetes (2) Mother, Father    Hypertension (1) Mother    Skin Cancer (1) Mother            No current outpatient medications on file.     No current facility-administered medications for this visit.      No Known Allergies    ROS:  12 point review of systems negative other than symptoms noted below.    OBJECTIVE:     /74   Wt 85.5 kg (188 lb 6.4 oz)   LMP 04/10/2019 (Approximate)   Breastfeeding? No   BMI 29.51 kg/m    Body mass index is 29.51 kg/m .    Exam:  Constitutional:  Appearance: Well nourished, well developed alert, in no acute distress  Chest:  Respiratory Effort:  Breathing unlabored  Neurologic/Psychiatric:  Mental Status:  Oriented X3      In-Clinic Test Results:  No results found for this or any previous visit (from the past 24 hour(s)).    ASSESSMENT/PLAN:                                                        ICD-10-CM    1. DUB (dysfunctional uterine bleeding) N93.8 ALPRAZolam (XANAX) 0.5 MG tablet     HYDROcodone-acetaminophen (NORCO) 5-325 MG tablet   2. Pre-procedure lab exam Z01.812 HCG Qual, Urine ([id number])     HCG Qual, Urine ([id number])   3. Endometrial polyp N84.0 ALPRAZolam (XANAX) 0.5 MG tablet     HYDROcodone-acetaminophen (NORCO) 5-325 MG tablet       -Reviewed US findings with pt. Area of flow at fundal region which may represent polyp or retained tissue as source of bleeding post AB. Rec hysteroscopy and resection if needed. Described  the procedure, risks and benefits. Scheduled for next week with Kyleena IUD placement same day  Questions answered.    Sonia Benito Masters, [facility] [place]

## 2019-04-26 ENCOUNTER — TELEPHONE (OUTPATIENT)
Dept: OBGYN | Facility: CLINIC | Age: 19
End: 2019-04-26

## 2019-04-26 NOTE — TELEPHONE ENCOUNTER
Please schedule for surgery:     Patient Name:  Kailey Argueta ([id number]).  :  2000        Physician requests assist from:  None  Requested Dates:  19  Schedule based on:  above  Amount of time needed for the procedure:  1hr     Expected time off from work:  1-2d  Surgeon:  Sonia Garay, DO  Surgery permit to read:  Hysteroscopy, polypectomy, kyleena IUD placement  Preop Needed:  No  Location for surgery to performed:   In Clinic  Anesthesia:  Oral meds, local   No Known Allergies  DIAGNOSIS:  DUB, endometrial polyp     Special instructions:  none  Vendor Rep:  none  Meds Needed: Vicodin: 1 hour prior to procedure, Quantity 4, XANAX: 0.5mg 1 hour prior to procedure and TORADOL: 60mg IM 30-45 minutes before procedure

## 2019-04-30 ENCOUNTER — OFFICE VISIT (OUTPATIENT)
Dept: OBGYN | Facility: CLINIC | Age: 19
End: 2019-04-30
Payer: COMMERCIAL

## 2019-04-30 ENCOUNTER — ALLIED HEALTH/NURSE VISIT (OUTPATIENT)
Dept: NURSING | Facility: CLINIC | Age: 19
End: 2019-04-30
Payer: COMMERCIAL

## 2019-04-30 VITALS
HEART RATE: 77 BPM | SYSTOLIC BLOOD PRESSURE: 132 MMHG | BODY MASS INDEX: 29.44 KG/M2 | WEIGHT: 188 LBS | TEMPERATURE: 97.7 F | DIASTOLIC BLOOD PRESSURE: 88 MMHG

## 2019-04-30 DIAGNOSIS — Z30.430 ENCOUNTER FOR INSERTION OF INTRAUTERINE CONTRACEPTIVE DEVICE: ICD-10-CM

## 2019-04-30 DIAGNOSIS — Z01.812 PRE-PROCEDURE LAB EXAM: ICD-10-CM

## 2019-04-30 DIAGNOSIS — O03.4 RETAINED PRODUCTS OF CONCEPTION FOLLOWING ABORTION: ICD-10-CM

## 2019-04-30 DIAGNOSIS — N93.8 DUB (DYSFUNCTIONAL UTERINE BLEEDING): Primary | ICD-10-CM

## 2019-04-30 DIAGNOSIS — R52 PAIN: Primary | ICD-10-CM

## 2019-04-30 LAB — HCG UR QL: NEGATIVE

## 2019-04-30 PROCEDURE — 58300 INSERT INTRAUTERINE DEVICE: CPT | Mod: 51 | Performed by: OBSTETRICS & GYNECOLOGY

## 2019-04-30 PROCEDURE — 58558 HYSTEROSCOPY BIOPSY: CPT | Performed by: OBSTETRICS & GYNECOLOGY

## 2019-04-30 PROCEDURE — 88305 TISSUE EXAM BY PATHOLOGIST: CPT | Performed by: OBSTETRICS & GYNECOLOGY

## 2019-04-30 PROCEDURE — 96372 THER/PROPH/DIAG INJ SC/IM: CPT

## 2019-04-30 PROCEDURE — 81025 URINE PREGNANCY TEST: CPT | Performed by: OBSTETRICS & GYNECOLOGY

## 2019-04-30 RX ORDER — ALPRAZOLAM 0.25 MG
0.5 TABLET ORAL ONCE
Status: COMPLETED | OUTPATIENT
Start: 2019-04-30 | End: 2019-04-30

## 2019-04-30 RX ORDER — HYDROCODONE BITARTRATE AND ACETAMINOPHEN 5; 325 MG/1; MG/1
1 TABLET ORAL ONCE
Status: COMPLETED | OUTPATIENT
Start: 2019-04-30 | End: 2019-04-30

## 2019-04-30 RX ORDER — KETOROLAC TROMETHAMINE 30 MG/ML
60 INJECTION, SOLUTION INTRAMUSCULAR; INTRAVENOUS ONCE
Status: COMPLETED | OUTPATIENT
Start: 2019-04-30 | End: 2019-04-30

## 2019-04-30 RX ADMIN — ALPRAZOLAM 0.5 MG: 0.25 TABLET ORAL at 11:05

## 2019-04-30 RX ADMIN — KETOROLAC TROMETHAMINE 60 MG: 30 INJECTION, SOLUTION INTRAMUSCULAR; INTRAVENOUS at 11:25

## 2019-04-30 RX ADMIN — HYDROCODONE BITARTRATE AND ACETAMINOPHEN 1 TABLET: 5; 325 TABLET ORAL at 11:05

## 2019-04-30 NOTE — NURSING NOTE
Pre-procedure Medications: Xanax and Vicodin  Pre-Procedure BP: 132/82, pulse 77  Temp: 97.7    LMP: 4/10/19    Serum Pregnancy Test: Urine pregnancy test negative    Allergies: no allergies    IF ALLERGIC TO ASA OR NSAIDS DO NOT GIVE TORADOL  Toradol injection: 60 mg IM  Time given: 1125  Site: LUQ - Gluteus  Lot #: [id number]  Expiration Date:  8/19  NDC: 80708-783-42    Anesthesia Used: Polocaine 1%  Amount given: 20 cc.    Expiration Date: 8/20    Post Procedure Blood Pressure: 126/88    Comments: Pt tolerated the procedure well. Is having some cramping. Discharge to home in stable condition.     Discharge instructions given: Yes    RN:  WOODROW Garcia    Surgeon: Dr. Jarrod Nunn CMA

## 2019-04-30 NOTE — PROGRESS NOTES
HYSTEROSCOPY, POLYPECTOMY, KYLEENA IUD PLACEMENT-  INDICATIONS:                                                                                                Kailey Argueta is a 19 year old female,   who presents for hysteroscopic resection of endometrial tissue seen on US with increased blood flow, and Kyleena IUD placement. The risks, benefits and alternatives of the procedure were discussed in detail previously.   She has elected to go ahead with the procedure today and her questions were answered.   Patient has been given the opportunity to ask questions about all forms of birth control, including all options appropriate for Kailey Argueta. Discussed that no method of birth control, except abstinence is 100% effective against pregnancy or sexually transmitted infection. She understands she may have the IUD removed at any time. IUD should be removed by a health care provider.  The entire insertion procedure was reviewed with the patient, including care after placement.    Consent was signed.     Is a pregnancy test required: Yes.  Was it positive or negative?  Negative       LMP 04/10/2019 (Approximate)            Preprocedure diagnosis: DUB, history , thickened endometrium with blood flow on US suspicious for potential polyp vs RPOC  Postop diagnosis: thickened endometrium concerning for RPOC  Procedure: hysteroscopy, endometrial sampling and resection of thickened tissue, Kyleena IUD placement  Surgeon: Sonia Garay DO  Anesthesia: local, oral meds  Deficit: 400mL  Specimen: endometrial tissue  Complications: none  EBL: 5mL  Findings: Fluffy endometrium; broad based fluffy tissue mass at the fundal anterior uterus with small calcifications at the periphery            HCG Qual Urine   Date Value Ref Range Status   2019 Negative NEG^Negative Final     Comment:     This test is for screening purposes.  Results should be interpreted along with   the clinical picture.  Confirmation testing is  available if warranted by   ordering OHN231, HCG Quantitative Pregnancy.         PROCEDURE:                                                      Hysteroscopy:                                                  Patient positioned in dorsal lithotomy position. Grave's speculum placed. Cervix visualized. Betadine used to cleanse cervix. Mepivicaine injected circumferentially on cervix. Single tooth tenaculum placed on anterior lip of cervix. Uterus sounded to 7cm. Gentle serial dilation performed. Hysteroscope placed, endometrial cavity surveyed, above findings noted. Pictures taken. Myosure Manual placed and resection of endometrial tissue performed. Final picture taken. Hysteroscope removed.     IUD Insertion:  Cervix re-prepped with Betadine solution swab x 3.  IUD prepared for placement, and IUD inserted according to 's instructions without difficulty or significant resitance, and deployed at the fundus. The strings were visualized and trimmed to 3.0 cm from the external os. Tenaculum was removed and hemostasis noted. Speculum removed.  Patient tolerated procedure well.    Kyleena   Lot # [id number]  Exp: 2021  S/N: [id number]  NDC: 08389-969-39      POST PROCEDURE:                                                                                              ASSESSMENT:     ICD-10-CM    1. DUB (dysfunctional uterine bleeding) N93.8 HYSTEROSCOPY W ENDOMETRIAL BX/POLYPECTOMY W/WO D&C     Surgical pathology exam   2. Encounter for insertion of intrauterine contraceptive device Z30.430 levonorgestrel (KYLEENA) 19.5 MG IUD 19.5 mg     INSERTION INTRAUTERINE DEVICE   3. Retained products of conception following  O03.4 HYSTEROSCOPY W ENDOMETRIAL BX/POLYPECTOMY W/WO D&C     Surgical pathology exam   4. Pre-procedure lab exam Z01.812 HCG Qual, Urine ([id number])      She  tolerated the procedure well. There were no complications. Patient was discharged in stable condition.      PLAN:  Given 's  handouts, including when to have IUD removed, list of danger s/sx, side effects and follow up recommended. Encouraged condom use for prevention of STD. Back up contraception advised for 7 days if progestin method. Advised to call for any fever, for prolonged or severe pain or bleeding, abnormal vaginal discharge, or unable to palpate strings. She was advised to use pain medications (ibuprofen) as needed for mild to moderate pain. Advised to follow-up in clinic in 4-6 weeks for IUD string check if unable to find strings or as directed by provider. No sex/tampons x 1 week. No Diva cup for 1 mo.  Condoms for contraception x 2 weeks.     Sonia Benito Masters, DO

## 2019-04-30 NOTE — PROGRESS NOTES
INDICATIONS:                                                                                                Kailey Argueta is a 19 year old female,   who presents for Hysteroscopy, polypectomy, Kyleena IUD placement  The risks, benefits and alternatives of  were discussed in detail previously.   She has elected to go ahead with the procedure today and her questions were answered. Consent was signed.     Is a pregnancy test required: Yes.  Was it positive or negative?  Negative       Was a consent obtained?  Yes         PROCEDURE:                                                                                                      Hysteroscopy, polypectomy, Kyleena IUD placement    POST PROCEDURE:                                                                                            She  tolerated the procedure well. There were no complications. Patient was discharged in stable condition.    Return to clinic in  weeks for recheck.  Call if severe cramping, fever, abnormal bleeding, abnormal discharge or pelvic pain develop.      TRIAGE

## 2019-05-02 LAB — COPATH REPORT: NORMAL

## 2019-05-06 ENCOUNTER — TELEPHONE (OUTPATIENT)
Dept: OBGYN | Facility: CLINIC | Age: 19
End: 2019-05-06

## 2019-05-06 NOTE — TELEPHONE ENCOUNTER
IUD Placement 4/30/19. Pt is having cramping with the IUD. Has been taking Ibuprofen for discomfort, 600 mg. Pt denies vag bleeding. Pt informed to try heating pad. Pt to call back if not better by Thursday or Friday.

## 2019-05-06 NOTE — TELEPHONE ENCOUNTER
Pt called specifying that she is experiencing symptoms since placement of IUD. Pt would like a call back at her home number on file and ok to LM on VM if unavailable to answer

## 2019-05-07 ENCOUNTER — HOSPITAL ENCOUNTER (EMERGENCY)
Facility: CLINIC | Age: 19
Discharge: HOME | End: 2019-05-07
Attending: EMERGENCY MEDICINE | Admitting: EMERGENCY MEDICINE
Payer: COMMERCIAL

## 2019-05-07 ENCOUNTER — APPOINTMENT (OUTPATIENT)
Dept: ULTRASOUND IMAGING | Facility: CLINIC | Age: 19
End: 2019-05-07
Attending: EMERGENCY MEDICINE
Payer: COMMERCIAL

## 2019-05-07 VITALS
OXYGEN SATURATION: 99 % | DIASTOLIC BLOOD PRESSURE: 92 MMHG | RESPIRATION RATE: 16 BRPM | HEART RATE: 61 BPM | SYSTOLIC BLOOD PRESSURE: 131 MMHG | TEMPERATURE: 98.1 F

## 2019-05-07 DIAGNOSIS — R10.2 PELVIC PAIN IN FEMALE: ICD-10-CM

## 2019-05-07 DIAGNOSIS — F32.A DEPRESSION, UNSPECIFIED DEPRESSION TYPE: ICD-10-CM

## 2019-05-07 LAB
ANION GAP SERPL CALCULATED.3IONS-SCNC: 6 MMOL/L (ref 3–14)
B-HCG SERPL-ACNC: 2 IU/L (ref 0–5)
BASOPHILS # BLD AUTO: 0 10E9/L (ref 0–0.2)
BASOPHILS NFR BLD AUTO: 0.5 %
BUN SERPL-MCNC: 13 MG/DL (ref 7–30)
CALCIUM SERPL-MCNC: 8.5 MG/DL (ref 8.5–10.1)
CHLORIDE SERPL-SCNC: 110 MMOL/L (ref 94–109)
CO2 SERPL-SCNC: 24 MMOL/L (ref 20–32)
CREAT SERPL-MCNC: 0.7 MG/DL (ref 0.52–1.04)
DIFFERENTIAL METHOD BLD: NORMAL
EOSINOPHIL # BLD AUTO: 0.2 10E9/L (ref 0–0.7)
EOSINOPHIL NFR BLD AUTO: 2.9 %
ERYTHROCYTE [DISTWIDTH] IN BLOOD BY AUTOMATED COUNT: 12.5 % (ref 10–15)
GFR SERPL CREATININE-BSD FRML MDRD: >90 ML/MIN/{1.73_M2}
GLUCOSE SERPL-MCNC: 102 MG/DL (ref 70–99)
HCT VFR BLD AUTO: 45.4 % (ref 35–47)
HGB BLD-MCNC: 14.9 G/DL (ref 11.7–15.7)
IMM GRANULOCYTES # BLD: 0 10E9/L (ref 0–0.4)
IMM GRANULOCYTES NFR BLD: 0.2 %
LYMPHOCYTES # BLD AUTO: 2.5 10E9/L (ref 0.8–5.3)
LYMPHOCYTES NFR BLD AUTO: 42.4 %
MCH RBC QN AUTO: 29.1 PG (ref 26.5–33)
MCHC RBC AUTO-ENTMCNC: 32.8 G/DL (ref 31.5–36.5)
MCV RBC AUTO: 89 FL (ref 78–100)
MONOCYTES # BLD AUTO: 0.4 10E9/L (ref 0–1.3)
MONOCYTES NFR BLD AUTO: 6.2 %
NEUTROPHILS # BLD AUTO: 2.8 10E9/L (ref 1.6–8.3)
NEUTROPHILS NFR BLD AUTO: 47.8 %
NRBC # BLD AUTO: 0 10*3/UL
NRBC BLD AUTO-RTO: 0 /100
PLATELET # BLD AUTO: 232 10E9/L (ref 150–450)
POTASSIUM SERPL-SCNC: 3.6 MMOL/L (ref 3.4–5.3)
RBC # BLD AUTO: 5.12 10E12/L (ref 3.8–5.2)
SODIUM SERPL-SCNC: 140 MMOL/L (ref 133–144)
WBC # BLD AUTO: 5.8 10E9/L (ref 4–11)

## 2019-05-07 PROCEDURE — 25000132 ZZH RX MED GY IP 250 OP 250 PS 637: Performed by: EMERGENCY MEDICINE

## 2019-05-07 PROCEDURE — 85025 COMPLETE CBC W/AUTO DIFF WBC: CPT | Performed by: EMERGENCY MEDICINE

## 2019-05-07 PROCEDURE — 99285 EMERGENCY DEPT VISIT HI MDM: CPT | Mod: 25

## 2019-05-07 PROCEDURE — 84702 CHORIONIC GONADOTROPIN TEST: CPT | Performed by: EMERGENCY MEDICINE

## 2019-05-07 PROCEDURE — 36415 COLL VENOUS BLD VENIPUNCTURE: CPT | Performed by: EMERGENCY MEDICINE

## 2019-05-07 PROCEDURE — 93976 VASCULAR STUDY: CPT

## 2019-05-07 PROCEDURE — 90791 PSYCH DIAGNOSTIC EVALUATION: CPT

## 2019-05-07 PROCEDURE — 80048 BASIC METABOLIC PNL TOTAL CA: CPT | Performed by: EMERGENCY MEDICINE

## 2019-05-07 RX ADMIN — NICOTINE POLACRILEX 2 MG: 2 GUM, CHEWING ORAL at 15:23

## 2019-05-07 ASSESSMENT — ENCOUNTER SYMPTOMS
CHILLS: 0
DYSURIA: 0
FEVER: 0
ABDOMINAL PAIN: 1
DYSPHORIC MOOD: 1
HEMATURIA: 0

## 2019-05-07 NOTE — ED TRIAGE NOTES
"ABC's intact.  Alert and oriented x4.    Pt states she presented to ED for abd pain today, but also states \"I can't stand my mind anymore.\"  Pt admits to suicidal ideation without plan at this time.      Pt c/o diarrhea for 3 days.    "

## 2019-05-07 NOTE — ED PROVIDER NOTES
History     Chief Complaint:  Abdominal Pain and Depression    HPI   Kailey Argueta is a 19 year old female who presents for evaluation of abdominal pain and depression.  She notes that she was recently seen by OB/GYN and a IUD was placed on Friday.  Since then she is having lower abdominal cramping which is mildly persistent, and worse than her usual IUD related discomfort.  She denies fevers, chills, dysuria, hematuria, or any other concerns.  Of note, she also notes that she has from increasingly depressed recently.  She has been awaiting a psychiatrist appointment but has not been able to get in.  To this effect, in triage she notes she felt somewhat suicidal, but on clarification and further history here, she states that she does not wish to die or harm herself, but does feel hopeless and at times feels that she would be fine if she .  She denies any alcohol or drug use, or any homicidal ideation.  She denies AH/VH.    Allergies:  NKDA      Medications:    The patient is not currently taking any prescribed medications.     Past Medical History:    ADHD   Anxiety  Bipolar 1 disorder   Migraine headaches  Hypertension     Past Surgical History:    Induced  by D&C     Family History:    Diabetes - Mother and father   Hypertension - Mother   Skin cancer - Mother     Social History:  Tobacco use:    Current every day smoker   Alcohol use:    Positive   Marital status:    Single     Review of Systems   Constitutional: Negative for chills and fever.   Gastrointestinal: Positive for abdominal pain.   Genitourinary: Negative for dysuria and hematuria.   Psychiatric/Behavioral: Positive for dysphoric mood. Negative for suicidal ideas.   All other systems reviewed and are negative.      Physical Exam   First Vitals:  BP: (!) 148/99  Pulse: 72  Temp: 98.1  F (36.7  C)  Resp: 16  SpO2: 98 %      Physical Exam  Constitutional: Alert, attentive  HENT:    Nose: Nose normal.    Mouth/Throat: Oropharynx is clear,  mucous membranes are moist  Eyes:  EOM are normal.    CV:  regular rate and rhythm; no murmurs, rubs or gallups  Chest: Effort normal and breath sounds normal.   GI:   Epigastrium - no tenderness, no guarding   RUQ - no tenderness or Veloz's sign   RLQ - No tenderness, no guarding, no Rovsing's sign   Suprapubic area - minimal tenderness, no guarding    LLQ - no tenderness, no guarding   LUQ - no tenderness, no guarding   No distension. Normal bowel sounds  MSK: Normal range of motion.   Neurological: Alert, attentive  Skin: Skin is warm and dry.  PSYCH:  Appearance:  awake, alert, adequately groomed, dressed in street clothes and appeared as age stated  Attitude:  cooperative  Eye Contact:  good  Mood:  Mildly depressed  Affect:  flat  Speech:  clear, coherent  Psychomotor Behavior:  no evidence of tardive dyskinesia, dystonia, or tics  Thought Process:  logical, linear and goal oriented  Associations:  no loose associations  Thought Content:  +inconsistent passive suicidal ideation, no plan; no homicidal ideation, no auditory or hallucinations present  Insight:  fair  Judgment:  intact  Oriented to:  time, person, and place  Attention Span and Concentration:  intact  Recent and Remote Memory:  intact  Language: fluent English  Fund of Knowledge: appropriate  Muscle Strength and Tone: normal  Gait and Station: Normal      Emergency Department Course     Imaging:  Radiographic findings were communicated with the patient who voiced understanding of the findings.    US Pelvis Cmplt w Transvag and Doppler LmtPel Duplex Limited:   IMPRESSION: Normal pelvic ultrasound. IUD appears in good position within the endometrial cavity. Ovaries are unremarkable. No ultrasound findings to suggest ovarian torsion.  Preliminary report per radiology.      Laboratory:  CBC: WNL (WBC 5.8, HGB 14.9, )  BMP: Chloride 110 high, Glucose 102 high, o/w WNL (Creatinine 0.70)    HCG Quantitative Pregnancy (Blood): 2     Emergency  Department Course:  Nursing notes and vitals reviewed.  1250: I performed an exam of the patient as documented above.     1448: I spoke to DEC regarding the patient.     1452: I updated and reassessed the patient.     A social work consult was ordered and DEC met with the patient.  Findings were discussed; please see note for details.     The patient was signed out to Dr. Tara Boudreaux pending DEC evaluation.      Impression & Plan      Medical Decision Making:  This is a 19-year-old female history of depression who presents for evaluation of mild abdominal cramping and concern for IUD placement as well as possible suicidal ideation.    1.  Cramping: The patient has a very benign abdominal exam without any significant tenderness.  Screening labs are within normal limits, she is not pregnant, and IUD is in good position on ultrasound.  She has having no symptoms to suggest cervicitis or PID and declines exam at this time which is very reasonable.  Plan outpatient follow-up for recheck if symptoms continue.  Strict return precautions for worse pain, vomiting, fever, or any other concerns.    2.  Depression symptoms: Her symptoms appear to be exacerbated by waiting to see a psychiatrist as well as a recent cramping as per above.  She is not actively planning on suicide but has had some passive suicidal ideation.  The patient did contract for safety during DEC evaluation and a therapy appointment has been scheduled for tomorrow. Plan followup as per above and strict RTED precautions for suicidal ideations, worsening mood, or other concerns.    Diagnosis:  1. Pelvic Pain  2. Depression    Disposition:  Home      I, Rishi Rooney, am serving as a scribe at 12:50 PM on 5/7/2019 to document services personally performed by Dr. Tinoco, based on my observations and the provider's statements to me.    [facility] EMERGENCY DEPARTMENT       Leopoldo Tinoco MD  05/07/19 2100       Leopoldo Tinoco,  MD  05/07/19 1529

## 2019-05-07 NOTE — ED NOTES
All patient questions have been answered, no further questions at this time. Discharge paperwork was gone over with patient. Patient verbalizes understanding of discharge teaching, medications and the importance of follow up.  Patient verbalizes feeling safe returning home at this time.    Patient was also given copy of safety plan and nurse went over it with patient. Patient verbalized understanding of it. Patient signed plan and was given a copy of it.

## 2019-05-07 NOTE — LETTER
May 7, 2019      To Whom It May Concern:      Kailey Argueta was seen in our Emergency Department today, 05/07/19.  I expect her condition to improve over the next 1 days.  She may return to work/school when improved.    Sincerely,        DOMENICO Padron

## 2019-05-07 NOTE — ED AVS SNAPSHOT
[facility] Emergency Department  [address]  [facility] [address]  Phone:  [phone]  Fax:  [phone]                                    Kailey Argueta   MRN: [id number]    Department:  [facility] Emergency Department   Date of Visit:  5/7/2019           After Visit Summary Signature Page    I have received my discharge instructions, and my questions have been answered. I have discussed any challenges I see with this plan with the nurse or doctor.    ..........................................................................................................................................  Patient/Patient Representative Signature      ..........................................................................................................................................  Patient Representative Print Name and Relationship to Patient    ..................................................               ................................................  Date                                   Time    ..........................................................................................................................................  Reviewed by Signature/Title    ...................................................              ..............................................  Date                                               Time          22EPIC Rev 08/18

## 2019-05-15 ENCOUNTER — OFFICE VISIT (OUTPATIENT)
Dept: FAMILY MEDICINE | Facility: CLINIC | Age: 19
End: 2019-05-15
Payer: COMMERCIAL

## 2019-05-15 VITALS
TEMPERATURE: 98.1 F | RESPIRATION RATE: 12 BRPM | HEART RATE: 73 BPM | SYSTOLIC BLOOD PRESSURE: 133 MMHG | OXYGEN SATURATION: 98 % | BODY MASS INDEX: 28.91 KG/M2 | DIASTOLIC BLOOD PRESSURE: 87 MMHG | WEIGHT: 184.6 LBS

## 2019-05-15 DIAGNOSIS — F32.89 OTHER DEPRESSION: Primary | ICD-10-CM

## 2019-05-15 PROCEDURE — 99213 OFFICE O/P EST LOW 20 MIN: CPT | Performed by: FAMILY MEDICINE

## 2019-05-15 RX ORDER — LAMOTRIGINE 100 MG/1
TABLET ORAL
Qty: 60 TABLET | Refills: 1 | Status: SHIPPED | OUTPATIENT
Start: 2019-05-15 | End: 2019-07-14

## 2019-05-15 RX ORDER — MIRTAZAPINE 15 MG/1
15 TABLET, FILM COATED ORAL AT BEDTIME
Qty: 30 TABLET | Refills: 1 | Status: SHIPPED | OUTPATIENT
Start: 2019-05-15 | End: 2019-07-14

## 2019-05-15 ASSESSMENT — ANXIETY QUESTIONNAIRES
GAD7 TOTAL SCORE: 17
1. FEELING NERVOUS, ANXIOUS, OR ON EDGE: NEARLY EVERY DAY
2. NOT BEING ABLE TO STOP OR CONTROL WORRYING: NEARLY EVERY DAY
5. BEING SO RESTLESS THAT IT IS HARD TO SIT STILL: SEVERAL DAYS
IF YOU CHECKED OFF ANY PROBLEMS ON THIS QUESTIONNAIRE, HOW DIFFICULT HAVE THESE PROBLEMS MADE IT FOR YOU TO DO YOUR WORK, TAKE CARE OF THINGS AT HOME, OR GET ALONG WITH OTHER PEOPLE: EXTREMELY DIFFICULT
6. BECOMING EASILY ANNOYED OR IRRITABLE: NEARLY EVERY DAY
7. FEELING AFRAID AS IF SOMETHING AWFUL MIGHT HAPPEN: NEARLY EVERY DAY
3. WORRYING TOO MUCH ABOUT DIFFERENT THINGS: MORE THAN HALF THE DAYS

## 2019-05-15 ASSESSMENT — PATIENT HEALTH QUESTIONNAIRE - PHQ9
SUM OF ALL RESPONSES TO PHQ QUESTIONS 1-9: 23
5. POOR APPETITE OR OVEREATING: MORE THAN HALF THE DAYS

## 2019-05-15 NOTE — PROGRESS NOTES
SUBJECTIVE:   Kailey Argueta is a 19 year old female who presents to clinic today for the following   health issues:severe anxiety and depression, relationship issues and status post elective  at [facility]        ED/UC Followup:    Facility:  [facility] Emergency Department   Date of visit: 19  Reason for visit: Abdominal pain, suicidal   Current Status: had about three panic attacks, since the discharge   Living and work situations stressful, has confidants to help her talk it through   Past Medical History:   Diagnosis Date     ADHD (attention deficit hyperactivity disorder)      Anxiety      Bipolar 1 disorder (H)      Headache      Hypertension      IUD contraception 2019    post ab       Past Surgical History:   Procedure Laterality Date     AS INDUCED ABORTN BY D&C  2019    16 weeks. [facility]       Family History   Problem Relation Age of Onset     Diabetes Mother      Hypertension Mother      Skin Cancer Mother      Diabetes Father        Social History     Tobacco Use     Smoking status: Current Every Day Smoker     Types: Cigarettes     Smokeless tobacco: Never Used     Tobacco comment: 5 cigs per day   Substance Use Topics     Alcohol use: Yes     Alcohol/week: 0.0 oz     Comment: once a month          [facility] prior Psych treatment and has Psych follow up . ER didn't want to prescribe her previously successful meds   Past Medical History:   Diagnosis Date     ADHD (attention deficit hyperactivity disorder)      Anxiety      Bipolar 1 disorder (H)      Headache      Hypertension      IUD contraception 2019    post ab       Past Surgical History:   Procedure Laterality Date     AS INDUCED ABORTN BY D&C  2019    16 weeks. [facility]       Family History   Problem Relation Age of Onset     Diabetes Mother      Hypertension Mother      Skin Cancer Mother      Diabetes Father        Social History     Tobacco Use     Smoking status: Current Every Day Smoker     Types: Cigarettes     Smokeless  tobacco: Never Used     Tobacco comment: 5 cigs per day   Substance Use Topics     Alcohol use: Yes     Alcohol/week: 0.0 oz     Comment: once a month     Inventory of mental status shows issues with anxiety , add and  depression.    Discussed regarding anxiety, sleep, anhedonia, irritability, energy,  perspective, self image, affect, current stressors, holistic parameters, work situation,  physical and social mitigating factors.    Past history has included past  episodes and positive  family history.    Prior treatment has included ssri and mood stabilizers  and talk therapy is arranged to continue and Psych eval with Hilary  .    Suicidal ideation is  absent.    No problems with vision, hearing, thyroid, chest pain, dyspnea,rash,  stomach upset, polyuria, polydipsia, dysuria,muscle aches, numbness,  cough, tics or balance issues. Memory is generally reliable .    LORELEI,thyroid normal, lungs clear, s1s2,soft abdoman, symmetrical dtrs,  no abdominal mass,good peripheral pulses, vs stable.  Discussed ssri vs dop/ser grouping vs wellbutrin in depression mgmt  Discussed anxiolytics as situational tools not specific therapy    (F32.89) Other depression  (primary encounter diagnosis)  Comment:   Plan: mirtazapine (REMERON) 15 MG tablet, lamoTRIgine        (LAMICTAL) 100 MG tablet        Previous lamicat success, role of mirtazepine for sleep     Contracts to see care anywhere and return here anytime needed

## 2019-05-16 ASSESSMENT — ANXIETY QUESTIONNAIRES: GAD7 TOTAL SCORE: 17

## 2019-05-30 ENCOUNTER — NURSE TRIAGE (OUTPATIENT)
Dept: NURSING | Facility: CLINIC | Age: 19
End: 2019-05-30

## 2019-05-31 NOTE — TELEPHONE ENCOUNTER
"Kailey reports she feels like she's \"having a mental breakdown\".    She states, \"I don't know what to do!\"    She is crying, hyperventilating, and speaking in single words and short phrases.    She started on medications to help with bipolar disorder 2 weeks ago.    She has not taken her doses yet tonight.  She's afraid of what her \"brain will do\"    The medications make her very sleepy and she has difficulty waking after taking them.    Per protocol, ER advises.  She is closest to [facility] [place]. Notified that [facility] and [facility] have mental health counselors in ER.    She reports she may go to [facility].    Mavis Chase RN  [facility]        Reason for Disposition    [1] Lightheadedness or dizziness AND [2] persists > 10 minutes AND [3] not relieved by reassurance provided by triager    Additional Information    Negative: [1] Difficulty breathing AND [2] persists > 10 minutes AND [3] not relieved by reassurance provided by triager    Protocols used: ANXIETY AND PANIC ATTACK-A-AH      "

## 2019-07-14 DIAGNOSIS — F32.89 OTHER DEPRESSION: ICD-10-CM

## 2019-07-15 RX ORDER — MIRTAZAPINE 15 MG/1
15 TABLET, FILM COATED ORAL AT BEDTIME
Qty: 30 TABLET | Refills: 0 | Status: SHIPPED | OUTPATIENT
Start: 2019-07-15 | End: 2019-09-20

## 2019-07-15 RX ORDER — LAMOTRIGINE 100 MG/1
TABLET ORAL
Qty: 60 TABLET | Refills: 0 | Status: SHIPPED | OUTPATIENT
Start: 2019-07-15 | End: 2019-09-20

## 2019-07-15 NOTE — TELEPHONE ENCOUNTER
"Requested Prescriptions   Pending Prescriptions Disp Refills     mirtazapine (REMERON) 15 MG tablet [Pharmacy Med Name: MIRTAZAPINE 15 MG TABLET]  Last Written Prescription Date:  05/15/2019  Last Fill Quantity: 30 tablet,  # refills: 1   Last Office Visit: 5/15/2019   Future Office Visit:    30 tablet 1     Sig: TAKE 1 TABLET (15 MG) BY MOUTH AT BEDTIME       Atypical Antidepressants Protocol Failed - 7/14/2019  8:41 AM        Failed - Patient has PHQ-9 score less than 5 in past 6 months.     Please review last PHQ-9 score.   PHQ-9 SCORE 5/31/2016 6/24/2016 5/15/2019   PHQ-9 Total Score 13 10 23     JOE-7 SCORE 5/31/2016 5/15/2019   Total Score 15 17           Passed - Medication active on med list        Passed - Patient is age 18 or older        Passed - No active pregnancy on record        Passed - No positive pregnancy test in past 12 mos        Passed - Recent (6 mo) or future (30 days) visit within the authorizing provider's specialty     Patient had office visit in the last 6 months or has a visit in the next 30 days with authorizing provider or within the authorizing provider's specialty.  See \"Patient Info\" tab in inbasket, or \"Choose Columns\" in Meds & Orders section of the refill encounter.            lamoTRIgine (LAMICTAL) 100 MG tablet [Pharmacy Med Name: LAMOTRIGINE 100 MG TABLET]  Last Written Prescription Date:  05/15/2019  Last Fill Quantity: 60 tablet,  # refills: 1   Last Office Visit: 5/15/2019   Future Office Visit:    60 tablet 1     Sig: TAKE 1 TABLET BY MOUTH TWICE A DAY       Anti-Seizure Meds Protocol  Failed - 7/14/2019  8:41 AM        Failed - Review Authorizing provider's last note.      Refer to last progress notes: confirm request is for original authorizing provider (cannot be through other providers).        Passed - Recent (12 mo) or future (30 days) visit within the authorizing provider's specialty     Patient had office visit in the last 12 months or has a visit in the next 30 " "days with authorizing provider or within the authorizing provider's specialty.  See \"Patient Info\" tab in inbasket, or \"Choose Columns\" in Meds & Orders section of the refill encounter.              Passed - Normal CBC on file in past 26 months     Recent Labs   Lab Test 05/07/19  1318   WBC 5.8   RBC 5.12   HGB 14.9   HCT 45.4              Passed - Normal ALT or AST on file in past 26 months     Recent Labs   Lab Test 01/16/19  1844   ALT 32     Recent Labs   Lab Test 01/16/19  1844   AST 15           Passed - Normal platelet count on file in past 26 months     Recent Labs   Lab Test 05/07/19  1318              Passed - Medication is active on med list        Passed - No active pregnancy on record        Passed - No positive pregnancy test in last 12 months          "

## 2019-07-15 NOTE — TELEPHONE ENCOUNTER
Medication is being filled for 1 time refill only due to:  due for Mood follow up visit and routine visit.  .  No future office visit scheduled.   Please call and help schedule.  Thank you!  Ricardo Doss, RN, BSN  (Covering RN, please route response(s) to your care team for any follow up that is needed. Thank you!)

## 2019-07-16 NOTE — TELEPHONE ENCOUNTER
Routed to [facility], please call pt to schedule appointment in next 30 days  Pia Santiago RN, BSN  Message handled by Nurse Triage.

## 2019-08-29 ENCOUNTER — NURSE TRIAGE (OUTPATIENT)
Dept: NURSING | Facility: CLINIC | Age: 19
End: 2019-08-29

## 2019-08-29 NOTE — TELEPHONE ENCOUNTER
"She has to convince someone to bring her. She is concerned about costs.  Savannah Álvarez RN-[facility]      Additional Information    Negative: Breathing stopped and hasn't returned    Negative: Choking on something    Negative: SEVERE difficulty breathing (e.g., struggling for each breath, speaks in single words, pulse > 120)    Negative: Bluish (or gray) lips or face    Negative: Difficult to awaken or acting confused (e.g., disoriented, slurred speech)    Negative: Passed out (i.e., fainted, collapsed and was not responding)    Negative: Wheezing started suddenly after medicine, an allergic food, or bee sting    Negative: Stridor    Negative: Slow, shallow and weak breathing    Negative: Sounds like a life-threatening emergency to the triager    Negative: Chest pain    Negative: Wheezing (high pitched whistling sound) and previous asthma attacks or use of asthma medicines    Negative: Difficulty breathing and only present when coughing    Negative: Difficulty breathing and only from stuffy or runny nose    Negative: MODERATE difficulty breathing (e.g., speaks in phrases, SOB even at rest, pulse 100-120) of new onset or worse than normal    Negative: Wheezing can be heard across the room    Negative: Drooling or spitting out saliva (because can't swallow)    Negative: Any history of prior \"blood clot\" in leg or lungs    Negative: Recent illness requiring prolonged bedrest (i.e., immobilization)    Negative: Hip or leg fracture in past 2 months (e.g., or had cast on leg or ankle)    Negative: Major surgery in the past month    Negative: Recent long-distance travel with prolonged time in car, bus, plane, or train (i.e., within past 2 weeks; 6 or  more hours duration)    Negative: Extra heart beats OR irregular heart beating   (i.e., \"palpitations\")    Negative: Fever > 103 F (39.4 C)    Negative: Fever > 101 F (38.3 C) and over 60 years of age    Negative: Fever > 100.0 F (37.8 C) and bedridden " (e.g., nursing home patient, stroke, chronic illness, recovering from surgery)    Negative: Fever > 100.0 F (37.8 C) and diabetes mellitus or weak immune system (e.g., HIV positive, cancer chemo, splenectomy, organ transplant, chronic steroids)    Negative: Periods where breathing stops and then resumes normally and bedridden (e.g., nursing home patient, CVA)    Negative: Pregnant or postpartum (< 1 month since delivery)    Negative: Patient sounds very sick or weak to the triager    MILD difficulty breathing (e.g., minimal/no SOB at rest, SOB with walking, pulse < 100) of new onset or worse than normal    Protocols used: BREATHING DIFFICULTY-A-OH

## 2019-09-20 ENCOUNTER — OFFICE VISIT (OUTPATIENT)
Dept: URGENT CARE | Facility: URGENT CARE | Age: 19
End: 2019-09-20
Payer: COMMERCIAL

## 2019-09-20 ENCOUNTER — NURSE TRIAGE (OUTPATIENT)
Dept: NURSING | Facility: CLINIC | Age: 19
End: 2019-09-20

## 2019-09-20 VITALS
TEMPERATURE: 97.6 F | OXYGEN SATURATION: 100 % | DIASTOLIC BLOOD PRESSURE: 70 MMHG | WEIGHT: 177 LBS | HEART RATE: 60 BPM | BODY MASS INDEX: 27.72 KG/M2 | SYSTOLIC BLOOD PRESSURE: 98 MMHG

## 2019-09-20 DIAGNOSIS — R19.7 DIARRHEA, UNSPECIFIED TYPE: ICD-10-CM

## 2019-09-20 DIAGNOSIS — R19.7 DIARRHEA, UNSPECIFIED TYPE: Primary | ICD-10-CM

## 2019-09-20 LAB
ANION GAP SERPL CALCULATED.3IONS-SCNC: 11 MMOL/L (ref 3–14)
BASOPHILS # BLD AUTO: 0 10E9/L (ref 0–0.2)
BASOPHILS NFR BLD AUTO: 0.2 %
BUN SERPL-MCNC: 8 MG/DL (ref 7–30)
C DIFF TOX B STL QL: NEGATIVE
CALCIUM SERPL-MCNC: 9.5 MG/DL (ref 8.5–10.1)
CHLORIDE SERPL-SCNC: 104 MMOL/L (ref 94–109)
CO2 SERPL-SCNC: 27 MMOL/L (ref 20–32)
CREAT SERPL-MCNC: 0.7 MG/DL (ref 0.52–1.04)
DIFFERENTIAL METHOD BLD: ABNORMAL
EOSINOPHIL # BLD AUTO: 0.5 10E9/L (ref 0–0.7)
EOSINOPHIL NFR BLD AUTO: 4.8 %
ERYTHROCYTE [DISTWIDTH] IN BLOOD BY AUTOMATED COUNT: 12.7 % (ref 10–15)
GFR SERPL CREATININE-BSD FRML MDRD: >90 ML/MIN/{1.73_M2}
GLUCOSE SERPL-MCNC: 97 MG/DL (ref 70–99)
HCT VFR BLD AUTO: 47 % (ref 35–47)
HGB BLD-MCNC: 15.8 G/DL (ref 11.7–15.7)
LYMPHOCYTES # BLD AUTO: 3.7 10E9/L (ref 0.8–5.3)
LYMPHOCYTES NFR BLD AUTO: 37.4 %
MCH RBC QN AUTO: 29.8 PG (ref 26.5–33)
MCHC RBC AUTO-ENTMCNC: 33.6 G/DL (ref 31.5–36.5)
MCV RBC AUTO: 89 FL (ref 78–100)
MONOCYTES # BLD AUTO: 0.6 10E9/L (ref 0–1.3)
MONOCYTES NFR BLD AUTO: 5.9 %
NEUTROPHILS # BLD AUTO: 5.1 10E9/L (ref 1.6–8.3)
NEUTROPHILS NFR BLD AUTO: 51.7 %
PLATELET # BLD AUTO: 278 10E9/L (ref 150–450)
POTASSIUM SERPL-SCNC: 3.6 MMOL/L (ref 3.4–5.3)
RBC # BLD AUTO: 5.31 10E12/L (ref 3.8–5.2)
SODIUM SERPL-SCNC: 142 MMOL/L (ref 133–144)
SPECIMEN SOURCE: NORMAL
WBC # BLD AUTO: 9.9 10E9/L (ref 4–11)

## 2019-09-20 PROCEDURE — 87209 SMEAR COMPLEX STAIN: CPT | Performed by: PHYSICIAN ASSISTANT

## 2019-09-20 PROCEDURE — 85025 COMPLETE CBC W/AUTO DIFF WBC: CPT | Performed by: PHYSICIAN ASSISTANT

## 2019-09-20 PROCEDURE — 36415 COLL VENOUS BLD VENIPUNCTURE: CPT | Performed by: PHYSICIAN ASSISTANT

## 2019-09-20 PROCEDURE — 80048 BASIC METABOLIC PNL TOTAL CA: CPT | Performed by: PHYSICIAN ASSISTANT

## 2019-09-20 PROCEDURE — 87506 IADNA-DNA/RNA PROBE TQ 6-11: CPT | Performed by: PHYSICIAN ASSISTANT

## 2019-09-20 PROCEDURE — 87177 OVA AND PARASITES SMEARS: CPT | Performed by: PHYSICIAN ASSISTANT

## 2019-09-20 PROCEDURE — 87493 C DIFF AMPLIFIED PROBE: CPT | Mod: 59 | Performed by: PHYSICIAN ASSISTANT

## 2019-09-20 PROCEDURE — 99213 OFFICE O/P EST LOW 20 MIN: CPT | Performed by: PHYSICIAN ASSISTANT

## 2019-09-20 NOTE — TELEPHONE ENCOUNTER
"Patient states she has had diarrhea and cramping x 1-2 weeks.  Describes 6-7 liquid stools in the past 24 hours.   Describes stools as \"light tan to almost black\" in color.   Denies blood in stools.  Abdominal cramping \"comes and goes.\" Describes as \"7-8\" on a 1-10 pain scale. Gets relief with passing a stool.  Has had \"hot and cold\" flashes the past week.    Currently afebrile by report.   Reports unable to tolerate food and beverages. States has lost 5 pounds.  Drinking water in decreased amounts.   Voiding as usual. Last voided 1 hour ago.  Last diarrhea stool 1 hour ago.    [place]   History of antibiotic use in \"late August.\"  On birth control. Last menstrual period 9/10/19.    Protocol-  Diarrhea- Adult  Care advice reviewed.   Disposition-  See Today in Office  Caller states understanding of the recommended disposition.   Transferred to  for appointment. Discussed Urgent Care option if no appointments available.  Advised to call back if further questions or concerns.     SONAL Landry RN  [facility]     Additional Information    Negative: Shock suspected (e.g., cold/pale/clammy skin, too weak to stand, low BP, rapid pulse)    Negative: Difficult to awaken or acting confused (e.g., disoriented, slurred speech)    Negative: Sounds like a life-threatening emergency to the triager    Negative: SEVERE abdominal pain (e.g., excruciating) and present > 1 hour    Negative: SEVERE abdominal pain and age > 60    Negative: Bloody, black, or tarry bowel movements    Negative: SEVERE diarrhea (e.g., 7 or more times / day more than normal) and age > 60 years    Negative: Constant abdominal pain lasting > 2 hours    Negative: Drinking very little and has signs of dehydration (e.g., no urine > 12 hours, very dry mouth, very lightheaded)    Negative: Patient sounds very sick or weak to the triager    SEVERE diarrhea (e.g., 7 or more times / day more than normal) " and present > 24 hours (1 day)    Protocols used: DIARRHEA-A-OH

## 2019-09-20 NOTE — LETTER
[facility] URGENT [facility]ARE  [address]  [address]  [place] [address]  Phone: [phone]  Fax: [phone]    September 20, 2019        Kailey Argueta  [address] [address]          To whom it may concern:    RE: Kailey Argueta    Patient was seen and treated today at our clinic. Please excuse from work on 9/20/2019.     Please contact me for questions or concerns.      Sincerely,        Caterina Snyder PA-C

## 2019-09-20 NOTE — PATIENT INSTRUCTIONS
"  Patient Education     [place] Diet  Your healthcare provider may recommend a bland diet if you have an upset stomach. It consists of foods that are mild and easy to digest. It is better to eat small frequent meals rather than 3 large meals a day.    Beverages  OK: Fruit juices, non-caffeinated teas and coffee, non-carbonated taylor  Avoid: Carbonated beverage, caffeinated tea and coffee, all alcoholic beverages  Bread  OK: Refined white, wheat or rye bread, andreina or soda crackers, Araceli toast, plain rolls, bagels  Avoid: Whole-grain bread  Cereal  OK: Refined cereals: cooked or ready to eat  Avoid: Whole-grain cereals and granola, or those containing bran, seeds or nuts  Desserts  OK: Peanut butter and all others except those to \"avoid\"  Avoid: Chocolate, cocoa, coconut, popcorn, nuts, seeds, jam, marmalade  Fruits  OK: Canned, cooked, frozen or fresh fruits without seeds or tough skin  Avoid: Olives, skin and seeds of fruit, dried fruit  Meats  OK: All fresh or preserved meat, fish and fowl  Avoid: Any that are prepared with those spices to \"avoid\"  Cheese and eggs  OK: Eggs, cottage cheese, cream cheese, other cheeses  Avoid: All cheeses made with those spices to \"avoid\"  Potatoes and pasta  OK: Potato, rice, macaroni, noodles, spaghetti  Avoid: None  Soups  OK: All soups without heavy seasoning  Avoid: Soups made with those spices to \"avoid\"  Vegetables  OK: Canned, cooked, fresh or frozen mildly flavored vegetables without seeds, skins or coarse fiber  Avoid: Vegetables prepared with those spices to \"avoid\"; skin and seeds of vegetables and those with coarse fiber, broccoli, cabbage, cauliflower, cucumber, green peppers, and corn  Spices  OK: Salt, lemon and limejuice, vinegar, all extracts, courtney, cinnamon, thyme, mace, allspice, paprika  Avoid: Chili powder, cloves, pepper, seed spices, garlic, gravy pickles, highly seasoned salad dressings  Date Last Reviewed: 5/1/2018 2000-2018 [employer] " [address] [place] [address]. All rights reserved. This information is not intended as a substitute for professional medical care. Always follow your healthcare professional's instructions.

## 2019-09-20 NOTE — PROGRESS NOTES
SUBJECTIVE:  Chief Complaint   Patient presents with     Urgent Care     Gastrointestinal Problem     x 2 weeks unable to eat or drink anything without having diarrhea. Feeling weak, shaking. No fevers.      Kailey Argueta is a 19 year old female whose symptoms began 2 weeks. Patient went to the [facility] and shortly after that her symptoms started. At first when she felt the symptoms, she had pain prior to having a bowel movement and once she had a bowel movement her symptoms improved. She reports having 5-6 bowel movements per day and usually occurring after she eats. She has not have fever or chills. No blood is noted in her stool. Her appetite is decreased.     Risk factors: She was on antibiotics last month, recently at the [facility]. NO recent travel. She has not been hospitalized recently.     Past Medical History:   Diagnosis Date     ADHD (attention deficit hyperactivity disorder)      Anxiety      Bipolar 1 disorder (H)      Headache      Hypertension      IUD contraception 01/2019    post ab   .  No current outpatient medications on file.     Social History     Tobacco Use     Smoking status: Current Every Day Smoker     Types: Cigarettes     Smokeless tobacco: Never Used     Tobacco comment: 5 cigs per day   Substance Use Topics     Alcohol use: Yes     Alcohol/week: 0.0 oz     Comment: once a month       ROS:  Review of systems negative except as stated above.    OBJECTIVE:  BP 98/70   Pulse 60   Temp 97.6  F (36.4  C) (Tympanic)   Wt 80.3 kg (177 lb)   SpO2 100%   BMI 27.72 kg/m    GENERAL APPEARANCE: healthy, alert and no distress  EYES: EOMI,  PERRL, conjunctiva clear  HENT: ear canals and TM's normal.  Nose and mouth without ulcers, erythema or lesions  NECK: supple, nontender, no lymphadenopathy  RESP: lungs clear to auscultation - no rales, rhonchi or wheezes  CV: regular rates and rhythm, normal S1 S2, no murmur noted  ABDOMEN:  soft, nontender, no HSM or masses and bowel sounds  normal  NEURO: Normal strength and tone, sensory exam grossly normal,  normal speech and mentation  SKIN: no suspicious lesions or rashes    Results for orders placed or performed in visit on 09/20/19   CBC with platelets and differential   Result Value Ref Range    WBC 9.9 4.0 - 11.0 10e9/L    RBC Count 5.31 (H) 3.8 - 5.2 10e12/L    Hemoglobin 15.8 (H) 11.7 - 15.7 g/dL    Hematocrit 47.0 35.0 - 47.0 %    MCV 89 78 - 100 fl    MCH 29.8 26.5 - 33.0 pg    MCHC 33.6 31.5 - 36.5 g/dL    RDW 12.7 10.0 - 15.0 %    Platelet Count 278 150 - 450 10e9/L    % Neutrophils 51.7 %    % Lymphocytes 37.4 %    % Monocytes 5.9 %    % Eosinophils 4.8 %    % Basophils 0.2 %    Absolute Neutrophil 5.1 1.6 - 8.3 10e9/L    Absolute Lymphocytes 3.7 0.8 - 5.3 10e9/L    Absolute Monocytes 0.6 0.0 - 1.3 10e9/L    Absolute Eosinophils 0.5 0.0 - 0.7 10e9/L    Absolute Basophils 0.0 0.0 - 0.2 10e9/L    Diff Method Automated Method    Basic metabolic panel   Result Value Ref Range    Sodium 142 133 - 144 mmol/L    Potassium 3.6 3.4 - 5.3 mmol/L    Chloride 104 94 - 109 mmol/L    Carbon Dioxide 27 20 - 32 mmol/L    Anion Gap 11 3 - 14 mmol/L    Glucose 97 70 - 99 mg/dL    Urea Nitrogen 8 7 - 30 mg/dL    Creatinine 0.70 0.52 - 1.04 mg/dL    GFR Estimate >90 >60 mL/min/[1.73_m2]    GFR Estimate If Black >90 >60 mL/min/[1.73_m2]    Calcium 9.5 8.5 - 10.1 mg/dL         ASSESSMENT / PLAN:  1. Diarrhea, unspecified type  19-year-old female presents the clinic for evaluation of 2 weeks of diarrhea.  Diarrhea occurs typically after she eats.  On exam abdomen is nontender, she does not have any rebound or rigidity.  I think the symptoms possibly due to a post viral gastroenteritis IBS.  I will do stool cultures today to ensure bacteria is not present. Encouraged BRAT diet. If symptoms do not resolve as expected follow-up with PCP.   - Enteric Bacteria and Virus Panel by JOSEPH Stool; Future  - CBC with platelets and differential  - Basic metabolic panel  -  Clostridium difficile toxin B PCR; Future  - Ova and Parasite Exam Routine; Future    Diagnosis and treatment plan was reviewed with patient and/or family.   We went over any labs or imaging. Discussed worsening symptoms or little to no relief despite treatment plan to follow-up with PCP or return to clinic.  Patient verbalizes understanding. All questions were addressed and answered.   Caterina Snyder PA-C

## 2019-09-23 LAB
O+P STL MICRO: NORMAL
O+P STL MICRO: NORMAL
SPECIMEN SOURCE: NORMAL

## 2019-10-10 ENCOUNTER — HOSPITAL ENCOUNTER (EMERGENCY)
Facility: CLINIC | Age: 19
Discharge: HOME | End: 2019-10-10
Attending: EMERGENCY MEDICINE | Admitting: EMERGENCY MEDICINE
Payer: COMMERCIAL

## 2019-10-10 ENCOUNTER — NURSE TRIAGE (OUTPATIENT)
Dept: NURSING | Facility: CLINIC | Age: 19
End: 2019-10-10

## 2019-10-10 VITALS
DIASTOLIC BLOOD PRESSURE: 99 MMHG | SYSTOLIC BLOOD PRESSURE: 140 MMHG | OXYGEN SATURATION: 99 % | HEART RATE: 88 BPM | TEMPERATURE: 98.6 F | RESPIRATION RATE: 20 BRPM

## 2019-10-10 DIAGNOSIS — F43.9 SITUATIONAL STRESS: ICD-10-CM

## 2019-10-10 DIAGNOSIS — S09.90XA CLOSED HEAD INJURY, INITIAL ENCOUNTER: ICD-10-CM

## 2019-10-10 DIAGNOSIS — R45.851 SUICIDAL THOUGHTS: ICD-10-CM

## 2019-10-10 DIAGNOSIS — S00.81XA ABRASION OF FOREHEAD, INITIAL ENCOUNTER: ICD-10-CM

## 2019-10-10 DIAGNOSIS — F32.A DEPRESSION, UNSPECIFIED DEPRESSION TYPE: ICD-10-CM

## 2019-10-10 PROCEDURE — 99285 EMERGENCY DEPT VISIT HI MDM: CPT | Mod: 25

## 2019-10-10 PROCEDURE — 90791 PSYCH DIAGNOSTIC EVALUATION: CPT

## 2019-10-10 ASSESSMENT — ENCOUNTER SYMPTOMS
NUMBNESS: 0
HEADACHES: 1
VOMITING: 0
NAUSEA: 0
DIZZINESS: 0
WEAKNESS: 0

## 2019-10-10 NOTE — ED TRIAGE NOTES
Patient states she hit head on metal on table yesterday.NO LOC.  Headache today nausea and no vomiting. ABC intact alert and no distress.

## 2019-10-10 NOTE — ED TRIAGE NOTES
"Patient states she has suicidal thoughts but\" would not do it \" And would like to talk to someone about this while she is here.   "

## 2019-10-10 NOTE — ED PROVIDER NOTES
"  History     Chief Complaint:  Head Injury and Suicidal     HPI:   The history is provided by the patient.      Kailey Argueta is a 19 year old female who presents for evaluation of a head injury and suicidal ideation. In regards to the head injury, the patient bent over and accidentally hit her head on the side of a table yesterday. She did not lose consciousness, but was sent home from work because she felt like her thinking was slow and she was \"babbling and talking like a 5 year old\". The patient has been having a 6/10 headache since hitting her head, which feels similar to her past migraines but is longer lasting. She denies nausea, vomiting, visual disturbance, or numbness/weakness.      In regards to her suicidal ideation, the patient states she has been struggling with anxiety and depression for years. Today, she states her ex-boyfriend posted something very hurtful about her on Facebook earlier in the week, which made her feel like she didn't want to be alive anymore. She states she has tried killing herself in the past by taking \"a bunch of pills\" but did not tell anyone at that time, vomited them, and was never hospitalized. She uses marijuana to help with her anxiety but denies any other drug use.      Allergies:  No known drug allergies    Medications:    The patient is not currently taking any prescribed medications.     Past Medical History:    ADHD  Anxiety  Bipolar 1 disorder  Hypertension  Migraines     Past Surgical History:    D&C   IUD insertion    Family History:    Diabetes- mother  Hypertension- mother  Skin cancer- mother  Diabetes- father     Social History:  No PCP.   Marital Status:  Single  Smoking status: current every day smoker, 5 cigarettes daily   Alcohol use: positive, once a month  Drug use: daily use of marijuana       Review of Systems   Eyes: Negative for visual disturbance.   Gastrointestinal: Negative for nausea and vomiting.   Neurological: Positive for headaches. Negative " for dizziness, weakness and numbness.   Psychiatric/Behavioral: Positive for suicidal ideas.   All other systems reviewed and are negative.    Physical Exam     Patient Vitals for the past 24 hrs:   BP Temp Temp src Pulse Resp SpO2   10/10/19 1156 (!) 140/99 98.6  F (37  C) Temporal 88 20 99 %        Physical Exam  General: Adult female sitting upright  Eyes: PERRL, Conjunctive within normal limits. EOMI.  HENT: No scalp tenderness or palpable hematoma. No hemotympanum.  Moist mucous membranes, oropharynx clear.   Neck: Nontender. Normal AROM.  CV: Normal S1S2. Regular rate and rhythm  Resp: Clear to auscultation bilaterally. Normal respiratory effort.  GI: Abdomen is soft, nontender and nondistended.  MSK: No edema. Nontender. Normal active range of motion.  Skin: Warm and dry. Superficial tiny linear abrasion over the left forehead. No other rashes or lesions or ecchymoses on visible skin.  Neuro: Alert and oriented. GCS 15. Responds appropriately to all questions and commands. No focal findings appreciated. Normal muscle tone.  Psych: Blunted affect. Teary at times.      Emergency Department Course     Emergency Department Course:  Past medical records, nursing notes, and vitals reviewed.  1209: I performed an exam of the patient and obtained history, as documented above.      1330: I rechecked the patient. Discussed plan with the patient.    I rechecked the patient. Findings and plan explained to the Patient. Patient discharged home with instructions regarding supportive care, medications, and reasons to return. The importance of close follow-up was reviewed.       Impression & Plan      Medical Decision Making:  Kailey Argueta is a 19 year old female with a history of depression and suicidal thoughts who presents to the emergency department with concerns for both ongoing suicidal thoughts as well as an injury to her head sustained yesterday. She has minimal evidence of external trauma on examination. She is  neurologically in tact. She has chronic headaches, and this is similar to her chronic headache and so I doubt that she has bleed as the cause of her symptoms. I believe the risk of CT radiation outweighs the benefits in this clinical situation. In regards to her suicidal thoughts, these are chronic for her as well. She does note that a recent situational stressor worsened these thoughts but she has no plan and does not believe she would harm herself. DEC evaluation was obtained and they feel comfortable as well for outpatient care and have scheduled a therapy appointment in 2 days, which the patient states she will keep. She is given other coping mechanisms and a crisis line to call in the meantime. She should return to the emergency department should symptoms worsen. All of her questions were answered prior to discharge.     Diagnosis:    ICD-10-CM    1. Depression, unspecified depression type F32.9    2. Suicidal thoughts R45.851    3. Situational stress F43.9    4. Closed head injury, initial encounter S09.90XA    5. Abrasion of forehead, initial encounter S00.81XA        Disposition:  discharged to home    I, Megan Beh, am serving as a scribe at 12:09 PM on 10/10/2019 to document services personally performed by Karolina Beckham MD based on my observations and the provider's statements to me.      10/10/2019   [facility] EMERGENCY DEPARTMENT       Karolina Beckham MD  10/14/19 0610

## 2019-10-10 NOTE — TELEPHONE ENCOUNTER
"Faustina hit her head yesterday. Immediately after she was speaking \"baby talk\". She explained this a speaking in single word phrases.  She has a headache today, significant increase in her anxiety, trouble focusing and nausea.  Her speech was hesitant making me think I was speaking to an interpretor.   Per the protocol, I instructed she be seen in an ER.She agreed and will have her grandmother drive her to [facility] ER, now.      Additional Information    Negative: ACUTE NEUROLOGIC SYMPTOM and symptom present now    Negative: Knocked out (unconscious) > 1 minute    Negative: Seizure (convulsion) occurred (Exception: prior history of seizures and now alert and without Acute Neurologic Symptoms)    Negative: Neck pain after dangerous injury (e.g., MVA, diving, trampoline, contact sports, fall > 10 feet or 3 meters) (Exception: neck pain began > 1 hour after injury)    Negative: Major bleeding (actively dripping or spurting) that can't be stopped    Negative: Penetrating head injury (e.g., knife, gunshot wound, metal object)    Negative: Sounds like a life-threatening emergency to the triager    Negative: Can't remember what happened (amnesia)    Negative: Vomiting once or more    Negative: Watery or blood-tinged fluid dripping from the nose or ears    ACUTE NEUROLOGIC SYMPTOM and now fine    Protocols used: HEAD INJURY-A-OH  Diamond RAMOS RN [facility]       "

## 2019-10-10 NOTE — ED AVS SNAPSHOT
[facility] Emergency Department  [address]  [facility] [address]  Phone:  [phone]  Fax:  [phone]                                    Kailey Argueta   MRN: [id number]    Department:  [facility] Emergency Department   Date of Visit:  10/10/2019           After Visit Summary Signature Page    I have received my discharge instructions, and my questions have been answered. I have discussed any challenges I see with this plan with the nurse or doctor.    ..........................................................................................................................................  Patient/Patient Representative Signature      ..........................................................................................................................................  Patient Representative Print Name and Relationship to Patient    ..................................................               ................................................  Date                                   Time    ..........................................................................................................................................  Reviewed by Signature/Title    ...................................................              ..............................................  Date                                               Time          22EPIC Rev 08/18

## 2019-10-10 NOTE — LETTER
October 10, 2019      To Whom It May Concern:      Kailey Argueta was seen in our Emergency Department today, 10/10/19.  I expect her condition to improve over the next 2 days.  She may return to work/school when improved.    Sincerely,        Skylar TROY RN

## 2019-10-16 ENCOUNTER — TELEPHONE (OUTPATIENT)
Dept: OBGYN | Facility: CLINIC | Age: 19
End: 2019-10-16

## 2019-10-16 NOTE — TELEPHONE ENCOUNTER
Pt calling with complaints of having a yellow discharge. She wants to know what is wrong.  She has had it for 2 days. No itching or pain.  Recommended that  She be evaluated by a dr.  Tranferred the pt to scheduling.

## 2019-11-08 ENCOUNTER — HEALTH MAINTENANCE LETTER (OUTPATIENT)
Age: 19
End: 2019-11-08

## 2019-11-25 ENCOUNTER — TELEPHONE (OUTPATIENT)
Dept: FAMILY MEDICINE | Facility: CLINIC | Age: 19
End: 2019-11-25

## 2019-11-25 NOTE — TELEPHONE ENCOUNTER
Panel Management Review      Patient has the following on her problem list: None      Composite cancer screening  Chart review shows that this patient is due/due soon for the following Pap Smear  Summary:    Patient is due/failing the following:   PAP    Action needed:   Patient needs office visit for Pap and physical.    Type of outreach:    Phone, spoke to patient.  pt preferred to go to obgyn for pap, but made an appointment for physical.    Questions for provider review:    None                                                                                                                                    Noy Mccoy MA  [facility].         Chart routed to none .

## 2020-02-08 ENCOUNTER — APPOINTMENT (OUTPATIENT)
Dept: ULTRASOUND IMAGING | Facility: CLINIC | Age: 20
End: 2020-02-08
Attending: EMERGENCY MEDICINE
Payer: COMMERCIAL

## 2020-02-08 ENCOUNTER — HOSPITAL ENCOUNTER (EMERGENCY)
Facility: CLINIC | Age: 20
Discharge: HOME | End: 2020-02-08
Attending: EMERGENCY MEDICINE | Admitting: EMERGENCY MEDICINE
Payer: COMMERCIAL

## 2020-02-08 ENCOUNTER — NURSE TRIAGE (OUTPATIENT)
Dept: NURSING | Facility: CLINIC | Age: 20
End: 2020-02-08

## 2020-02-08 VITALS
SYSTOLIC BLOOD PRESSURE: 125 MMHG | HEART RATE: 69 BPM | OXYGEN SATURATION: 100 % | TEMPERATURE: 97.5 F | DIASTOLIC BLOOD PRESSURE: 82 MMHG | RESPIRATION RATE: 18 BRPM

## 2020-02-08 DIAGNOSIS — R10.30 LOWER ABDOMINAL PAIN: ICD-10-CM

## 2020-02-08 LAB
ALBUMIN UR-MCNC: NEGATIVE MG/DL
ANION GAP SERPL CALCULATED.3IONS-SCNC: 4 MMOL/L (ref 3–14)
APPEARANCE UR: CLEAR
BASOPHILS # BLD AUTO: 0.1 10E9/L (ref 0–0.2)
BASOPHILS NFR BLD AUTO: 0.8 %
BILIRUB UR QL STRIP: NEGATIVE
BUN SERPL-MCNC: 11 MG/DL (ref 7–30)
CALCIUM SERPL-MCNC: 9.1 MG/DL (ref 8.5–10.1)
CHLORIDE SERPL-SCNC: 110 MMOL/L (ref 94–109)
CO2 SERPL-SCNC: 26 MMOL/L (ref 20–32)
COLOR UR AUTO: YELLOW
CREAT SERPL-MCNC: 0.67 MG/DL (ref 0.52–1.04)
DIFFERENTIAL METHOD BLD: ABNORMAL
EOSINOPHIL # BLD AUTO: 0.7 10E9/L (ref 0–0.7)
EOSINOPHIL NFR BLD AUTO: 8.9 %
ERYTHROCYTE [DISTWIDTH] IN BLOOD BY AUTOMATED COUNT: 12.7 % (ref 10–15)
GFR SERPL CREATININE-BSD FRML MDRD: >90 ML/MIN/{1.73_M2}
GLUCOSE SERPL-MCNC: 100 MG/DL (ref 70–99)
GLUCOSE UR STRIP-MCNC: NEGATIVE MG/DL
HCG UR QL: NEGATIVE
HCT VFR BLD AUTO: 48.6 % (ref 35–47)
HGB BLD-MCNC: 16.3 G/DL (ref 11.7–15.7)
HGB UR QL STRIP: NEGATIVE
IMM GRANULOCYTES # BLD: 0 10E9/L (ref 0–0.4)
IMM GRANULOCYTES NFR BLD: 0.2 %
KETONES UR STRIP-MCNC: NEGATIVE MG/DL
LEUKOCYTE ESTERASE UR QL STRIP: NEGATIVE
LYMPHOCYTES # BLD AUTO: 3.4 10E9/L (ref 0.8–5.3)
LYMPHOCYTES NFR BLD AUTO: 40.5 %
MCH RBC QN AUTO: 30.6 PG (ref 26.5–33)
MCHC RBC AUTO-ENTMCNC: 33.5 G/DL (ref 31.5–36.5)
MCV RBC AUTO: 91 FL (ref 78–100)
MONOCYTES # BLD AUTO: 0.5 10E9/L (ref 0–1.3)
MONOCYTES NFR BLD AUTO: 6.5 %
MUCOUS THREADS #/AREA URNS LPF: PRESENT /LPF
NEUTROPHILS # BLD AUTO: 3.6 10E9/L (ref 1.6–8.3)
NEUTROPHILS NFR BLD AUTO: 43.1 %
NITRATE UR QL: NEGATIVE
NRBC # BLD AUTO: 0 10*3/UL
NRBC BLD AUTO-RTO: 0 /100
PH UR STRIP: 5 PH (ref 5–7)
PLATELET # BLD AUTO: 271 10E9/L (ref 150–450)
POTASSIUM SERPL-SCNC: 3.9 MMOL/L (ref 3.4–5.3)
RBC # BLD AUTO: 5.33 10E12/L (ref 3.8–5.2)
RBC #/AREA URNS AUTO: <1 /HPF (ref 0–2)
SODIUM SERPL-SCNC: 140 MMOL/L (ref 133–144)
SOURCE: ABNORMAL
SP GR UR STRIP: 1.03 (ref 1–1.03)
SPECIMEN SOURCE: NORMAL
SQUAMOUS #/AREA URNS AUTO: 3 /HPF (ref 0–1)
UROBILINOGEN UR STRIP-MCNC: NORMAL MG/DL (ref 0–2)
WBC # BLD AUTO: 8.3 10E9/L (ref 4–11)
WBC #/AREA URNS AUTO: 2 /HPF (ref 0–5)
WET PREP SPEC: NORMAL

## 2020-02-08 PROCEDURE — 99285 EMERGENCY DEPT VISIT HI MDM: CPT | Mod: 25

## 2020-02-08 PROCEDURE — 80048 BASIC METABOLIC PNL TOTAL CA: CPT | Performed by: EMERGENCY MEDICINE

## 2020-02-08 PROCEDURE — 87210 SMEAR WET MOUNT SALINE/INK: CPT | Performed by: EMERGENCY MEDICINE

## 2020-02-08 PROCEDURE — 76856 US EXAM PELVIC COMPLETE: CPT

## 2020-02-08 PROCEDURE — 81025 URINE PREGNANCY TEST: CPT | Performed by: EMERGENCY MEDICINE

## 2020-02-08 PROCEDURE — 87491 CHLMYD TRACH DNA AMP PROBE: CPT | Performed by: EMERGENCY MEDICINE

## 2020-02-08 PROCEDURE — 87591 N.GONORRHOEAE DNA AMP PROB: CPT | Performed by: EMERGENCY MEDICINE

## 2020-02-08 PROCEDURE — 85025 COMPLETE CBC W/AUTO DIFF WBC: CPT | Performed by: EMERGENCY MEDICINE

## 2020-02-08 PROCEDURE — 81001 URINALYSIS AUTO W/SCOPE: CPT | Performed by: EMERGENCY MEDICINE

## 2020-02-08 ASSESSMENT — ENCOUNTER SYMPTOMS
FREQUENCY: 1
FEVER: 0
CHILLS: 1
VOMITING: 0
NAUSEA: 1
ABDOMINAL PAIN: 1
DYSURIA: 0

## 2020-02-08 NOTE — LETTER
February 8, 2020      To Whom It May Concern:      Kailey Argueta was seen in our Emergency Department today, 02/08/20.  I expect her condition to improve over the next 1-2 days.  She may return to work/school when improved.    Sincerely,        Adrien Cool RN

## 2020-02-08 NOTE — TELEPHONE ENCOUNTER
"Patient called and states she thinks her IUD is falling out. This same thing happened before, and her IUD had fallen out of place.     Describes it as a Sudden, sharp, stabbing pain. Cannot move stomach    Standing 6-7/10  moving jumps to 1010    No fever  No difficulty breathing    Triaged to a disposition of Go to ED Now . Patient is agreeable    Reason for Disposition    [1] SEVERE pain (e.g., excruciating) AND [2] present > 1 hour    Additional Information    Negative: Shock suspected (e.g., cold/pale/clammy skin, too weak to stand, low BP, rapid pulse)    Negative: Difficult to awaken or acting confused (e.g., disoriented, slurred speech)    Negative: Passed out (i.e., lost consciousness, collapsed and was not responding)    Negative: Sounds like a life-threatening emergency to the triager    Negative: Chest pain    Negative: Pain is mainly in upper abdomen  (if needed ask: \"is it mainly above the belly button?\")    Negative: Followed an abdomen (stomach) injury    Negative: [1] Abdominal pain AND [2] pregnant < 20 weeks    Negative: [1] Abdominal pain AND [2] pregnant > 20 weeks    Negative: [1] Abdominal pain AND [2] postpartum < 1 month since delivery    Protocols used: ABDOMINAL PAIN - FEMALE-A-    Serena Sarkar RN on 2/8/2020 at 7:09 AM    "

## 2020-02-08 NOTE — LETTER
February 8, 2020      To Whom It May Concern:      Kailey Argueta was seen in our Emergency Department today, 02/08/20.  I expect her condition to improve over the next 1-2 days.  She may return to work/school when improved.    Sincerely,        DOMENICO Jurado

## 2020-02-08 NOTE — ED AVS SNAPSHOT
[facility] Emergency Department  [address]  [facility] [address]  Phone:  [phone]  Fax:  [phone]                                    Kailey Argueta   MRN: [id number]    Department:  [facility] Emergency Department   Date of Visit:  2/8/2020           After Visit Summary Signature Page    I have received my discharge instructions, and my questions have been answered. I have discussed any challenges I see with this plan with the nurse or doctor.    ..........................................................................................................................................  Patient/Patient Representative Signature      ..........................................................................................................................................  Patient Representative Print Name and Relationship to Patient    ..................................................               ................................................  Date                                   Time    ..........................................................................................................................................  Reviewed by Signature/Title    ...................................................              ..............................................  Date                                               Time          22EPIC Rev 08/18

## 2020-02-09 NOTE — ED TRIAGE NOTES
Pt states lower abdominal pain and cramping since 0000. States she is concerned something is wrong with IUD. Also c/o nausea and diarrhea. ABCs intact GCS 15

## 2020-02-09 NOTE — ED PROVIDER NOTES
History     Chief Complaint:  Abdominal Pain.    HPI   Kailey Argueta is a 19 year old female with an IUD who presents with abdominal pain. Around midnight last night, she had sudden onset abdominal pain. She endorses urinary frequency, nausea, and chills. She denies dysuria, vomiting, fever, and vaginal bleeding or discharge. She is worried about her uterus and IUD. No other symptoms were reported.    Allergies:  No known drug allergies    Medications:    The patient is not currently taking any prescribed medications.    Past Medical History:    ADHD  Anxiety  Bipolar 1 disorder  Headache  Hypertension    Past Surgical History:    Induced  by D&C    Family History:    Mother: Diabetes, hypertension, skin cancer  Father: Diabetes    Social History:  Smoking Status: Current Every Day  Alcohol Use: Positive, once a month  Drug Use: Positive, marijuana daily  Marital Status:  Single     Review of Systems   Constitutional: Positive for chills. Negative for fever.   Gastrointestinal: Positive for abdominal pain and nausea. Negative for vomiting.   Genitourinary: Positive for frequency. Negative for dysuria, vaginal bleeding and vaginal discharge.   All other systems reviewed and are negative.      Physical Exam     Patient Vitals for the past 24 hrs:   BP Temp Temp src Heart Rate Resp SpO2   20 (!) 127/101 97.5  F (36.4  C) Temporal 90 18 99 %       Physical Exam  General: Patient is awake, alert and interactive when I enter the room, appears uncomfortable.  Eyes: Conjunctivae and sclerae are normal  Neck: Normal range of motion. No anterior cervical lymphadenopathy noted  CV: Regular rate. S1/S2. No murmurs.   Resp: Lungs are clear without wheezes or rales. No respiratory distress.   GI: Abdomen is soft, no rigidity, guarding, or rebound. No distension. No tenderness to palpation in any quadrant.    : External exam: no lesions, no erythema/cellulitis  Internal exam: Normal introitus. No evidence of  erythema, ecchymosis or lesions.   Cervical appears erythematous  no vaginal discharge noted. No gross pus from cervical os noted.    no blood noted.  Bimanual exam:   no Cervical motion tenderness.   no Pain to palpation of adnexal structures  no Pain to palpation of uterus/bladder:   MS: moving all extremities.   Skin: No rash or lesions noted. Normal capillary refill noted  Neuro: Speech is normal and fluent. Face is symmetric. Moving all extremities.   Psych:  Normal affect.  Appropriate interactions.    Emergency Department Course     Imaging:  Radiology findings were communicated with the patient who voiced understanding of the findings.    US Pelvic Complete w Transvaginal & Abd/Pel Duplex Limited  1.  IUD within the endometrial canal, lower uterine segment.  2.  Exam otherwise negative.  Reading per radiology.    Laboratory:  Laboratory findings were communicated with the patient who voiced understanding of the findings.    CBC: WBC 8.3, HGB 16.3(H),   BMP: Chloride 110(H), Glucose 100(H) o/w WNL (Creatinine 0.67)  HCG qualitative urine: Negative  UA with micro: Squamous epithelial 3(H), Mucous Present(A) o/w WNL  Wet prep: WNL  Neisseria gonorrhoeae PCR: In process  Chlamydia trachomatis PCR: In process    Emergency Department Course:    2030 Nursing notes and vitals reviewed.    2039 I performed an exam of the patient as documented above.     2049 The patient provided a urine sample here in the emergency department. This was sent for laboratory testing, findings above.    2053 IV was inserted and blood was drawn for laboratory testing, results above.    2206 I performed a pelvic exam.    2131 The patient was sent for an US while in the emergency department, results above.     2215 Wet prep, Neisseria gonorrhoeae and Chlamydia trachomatis PCR tests collected, results above.    2303 Findings and plan explained to the patient. Patient discharged home with instructions regarding supportive care,  medications, and reasons to return. The importance of close follow-up was reviewed.     Impression & Plan      Medical Decision Making:  Kailey Argueta is a 19 year old female who presents to the emergency department today for evaluation of lower pelvic pain.  On initial valuation she is hemodynamically stable normal vital signs.  She is afebrile.  Ultrasound reveals IUD within the uterus.  I do not believe that this is the cause of her discomfort here today.  The remainder of her ultrasound also was negative for any acute pathology of her pain including torsion or hemorrhagic cysts etc.  Blood work here in the emergency department was reassuring.  Urine was negative for signs of infection.  She is not pregnant here today.  Pelvic exam also was reassuring with no significant cervical motion tenderness or adnexal tenderness.  Wet prep was negative.  GC is currently pending.  At this point I do not have a clear cause for the patient's lower pelvic pain.  But she should follow-up with her OB/GYN next week to discuss whether she wants the IUD removed and for further evaluation.  I discussed my findings with the patient and her father.  All questions were answered and patient be discharged home in stable condition.    Diagnosis:    ICD-10-CM    1. Lower abdominal pain R10.30      Disposition:   Patient was discharged home.    Discharge Medications:  New Prescriptions    No medications on file       Scribe Disclosure:  I, Nikita Esparza, am serving as a scribe at 8:39 PM on 2/8/2020 to document services personally performed by Ricardo Richardson MD based on my observations and the provider's statements to me.    [facility] EMERGENCY DEPARTMENT       Ricardo Richardson MD  02/08/20 9029

## 2020-02-23 ENCOUNTER — HEALTH MAINTENANCE LETTER (OUTPATIENT)
Age: 20
End: 2020-02-23

## 2020-03-19 ENCOUNTER — NURSE TRIAGE (OUTPATIENT)
Dept: NURSING | Facility: CLINIC | Age: 20
End: 2020-03-19

## 2020-03-20 ENCOUNTER — VIRTUAL VISIT (OUTPATIENT)
Dept: FAMILY MEDICINE | Facility: OTHER | Age: 20
End: 2020-03-20

## 2020-03-20 NOTE — TELEPHONE ENCOUNTER
"Shortness of breath when waking up and upon exertion  Cough  General fatigue, slow and sluggish   Hot and cold flashes   Feels like nasal passage-ways are blocked, not congested but blocked    Patient wondering if she can stop and get tested.     Triaged to a disposition of [facility]. Patient is agreeable.     Reason for Disposition    [1] MILD difficulty breathing (e.g., minimal/no SOB at rest, SOB with walking, pulse <100) AND [2] NEW-onset or WORSE than normal    [1] Cough occurs AND [2] within 14 days of CORONAVIRUS EXPOSURE    Additional Information    Negative: [1] Breathing stopped AND [2] hasn't returned    Negative: Choking on something    Negative: Severe difficulty breathing (e.g., struggling for each breath, speaks in single words)    Negative: Bluish (or gray) lips or face now    Negative: Difficult to awaken or acting confused (e.g., disoriented, slurred speech)    Negative: Passed out (i.e., lost consciousness, collapsed and was not responding)    Negative: Wheezing started suddenly after medicine, an allergic food or bee sting    Negative: Stridor    Negative: Slow, shallow and weak breathing    Negative: Sounds like a life-threatening emergency to the triager    Negative: Chest pain    Negative: [1] Wheezing (high pitched whistling sound) AND [2] previous asthma attacks or use of asthma medicines    Negative: [1] Difficulty breathing AND [2] only present when coughing    Negative: [1] Difficulty breathing AND [2] only from stuffy or runny nose    Negative: [1] MODERATE difficulty breathing (e.g., speaks in phrases, SOB even at rest, pulse 100-120) AND [2] NEW-onset or WORSE than normal    Negative: Wheezing can be heard across the room    Negative: Drooling or spitting out saliva (because can't swallow)    Negative: History of prior \"blood clot\" in leg or lungs (i.e., deep vein thrombosis, pulmonary embolism)    Negative: History of inherited increased risk of blood clots (e.g., Factor 5 Leiden, " "Anti-thrombin 3, Protein C or Protein S deficiency, Prothrombin mutation)    Negative: Recent illness requiring prolonged bedrest (i.e., immobilization)    Negative: Hip or leg fracture in past 2 months (e.g., had cast on leg or ankle)    Negative: Major surgery in the past month    Negative: Recent long-distance travel with prolonged time in car, bus, plane, or train (i.e., within past 2 weeks; 6 or  more hours duration)    Negative: Extra heart beats OR irregular heart beating   (i.e., \"palpitations\")    Negative: Fever > 103 F (39.4 C)    Negative: [1] Fever > 101 F (38.3 C) AND [2] age > 60    Negative: [1] Fever > 100.0 F (37.8 C) AND [2] bedridden (e.g., nursing home patient, CVA, chronic illness, recovering from surgery)    Negative: [1] Fever > 100.0 F (37.8 C) AND [2] diabetes mellitus or weak immune system (e.g., HIV positive, cancer chemo, splenectomy, chronic steroids)    Negative: [1] Periods where breathing stops and then resumes normally AND [2] bedridden (e.g., nursing home patient, CVA)    Negative: Pregnant or postpartum (< 1 month since delivery)    Negative: Patient sounds very sick or weak to the triager    Negative: Severe difficulty breathing (e.g., struggling for each breath, speak in single words, bluish lips)    Negative: Sounds like a life-threatening emergency to the triager    Negative: [1] Difficulty breathing (shortness of breath) occurs AND [2] onset > 14 days after CORONAVIRUS EXPOSURE (Close Contact)    Negative: [1] Dry cough occurs AND [2] onset > 14 days after CORONAVIRUS EXPOSURE    Negative: [1] Wet cough (i.e., white-yellow, yellow, green, or claudia colored sputum) AND [2] onset > 14 days after CORONAVIRUS EXPOSURE    Negative: [1] Common cold symptoms AND [2] onset > 14 days after CORONAVIRUS EXPOSURE    Negative: [1] Difficulty breathing occurs AND [2] within 14 days of CORONAVIRUS EXPOSURE    Negative: Patient sounds very sick or weak to the triager    Negative: [1] Fever or " "feeling feverish AND [2] within 14 Days of CORONAVIRUS EXPOSURE    Answer Assessment - Initial Assessment Questions  1. PLACE of CONTACT: \"Where were you when you were exposed to coronavirus?\" (e.g., city, state, country)      School  2. TYPE of CONTACT: \"How much contact was there?\" (e.g., live in same house, work in same office, same school)      Hanging out with friends  3. DATE of CONTACT: \"When did you have contact with a coronavirus patient?\" (e.g., days)      Last week  4. SYMPTOMS: \"Do you have any symptoms?\" (e.g., fever, cough, breathing difficulty)     Cough and difficulty breathing   5. PREGNANCY OR POSTPARTUM: \"Is there any chance you are pregnant?\" \"When was your last menstrual period?\" \"Did you deliver in the last 2 weeks?\"      n/a  6. HIGH RISK: \"Do you have any heart or lung problems? Do you have a weakened immune system?\" (e.g., CHF, COPD, asthma, HIV positive, chemotherapy, renal failure, diabetes mellitus, sickle cell anemia)      n/a    [facility] Specific Disposition  - REQUIRED: [facility] Specific Patient Instructions  COVID 19 Nurse Triage Plan    Please be aware that novel coronavirus (COVID-19) may be circulating in the community. If you develop symptoms such as fever, cough, or SOB or if you have concerns about the presence of another infection including coronavirus (COVID-19), please contact your health care provider or visit [email or web address].     Patient HAS known exposure, fever, cough or SOB in addition to reason for call.   Patient referred to virtual visit with a provider through [facility] (Preferred option). **Follow System Ambulatory Workflow for COVID 19 on instructions on how to access [facility]**     Instructions Given to Patient  It is recommended that you setup a virtual visit with one of our virtual providers.  To do this follow these instructions:    1. Go to the website [email or web address]  2. Create an account (you will need your insurance information)  3. Start a " new visit  4. Choose your diagnosis (e.g. COVID19)  5. Fill out the information about your symptoms  6. A provider will reach out to you by text, phone call or video visit based on your request    While you are at home please follow these instructions to care for yourself:    Regardless of if you have been tested or not:  Patient who have symptoms (cough, fever, or shortness of breath), need to isolate for 7 days from when symptoms started OR 72 hours after fever resolves (without fever reducing medications) AND improvement of respiratory symptoms (whichever is longer).    Isolate yourself at home (in own room/own bathroom if possible)  Do Not allow any visitors  Do Not go to work or school  Do Not go to [employer],  centers, shopping, or other public places.  Do Not shake hands.  Avoid close and intimate contact with others (hugging, kissing).  Follow CDC recommendations for household cleaning of frequently touched services.     After the initial 7 days, continue to isolate yourself from household members as much as possible. To continue decrease the risk of community spread and exposure, you and any members of your household should limit activities in public for 14 days after starting home isolation.     You can reference the following CDC link for helpful home isolation/care tips:  https://www.cdc.gov/coronavirus/2019-ncov/downloads/10Things.pdf    Protect Others:  Cover Your Mouth and Nose with a mask, disposable tissue or wash cloth to avoid spreading germs to others.  Wash your hands and face frequently with soap and water.    Fever Medicines:  For fever relief, take acetaminophen or ibuprofen.  Treat fevers above 101  F (38.3  C) to lower fevers and make you more comfortable.   Acetaminophen (e.g., Tylenol): Take 650 mg (two 325 mg pills) by mouth every 4-6 hours as needed of regular strength Tylenol or 1,000 mg (two 500 mg pills) every 8 hours as needed of Extra Strength Tylenol.   Ibuprofen (e.g.,  Motrin, Advil): Take 400 mg (two 200 mg pills) by mouth every 6 hours as needed.   Acetaminophen is thought to be safer than ibuprofen or naproxen for people over 65 years old. Acetaminophen is in many OTC and prescription medicines. It might be in more than one medicine that you are taking. You need to be careful and not take an overdose. Before taking any medicine, read all the instructions on the package.  Caution - NSAIDs (e.g., ibuprofen, naproxen): Do not take nonsteroidal anti-inflammatory drugs (NSAIDs) if you have stomach problems, kidney disease, heart failure, or other contraindications to using this type of medicine. Do not take NSAID medicines for over 7 days without consulting your PCP. Do not take NSAID medicines if you are pregnant. Do not take NSAID medicines if you are also taking blood thinners.     Call Back If: Breathing difficulty develops or you become worse.    Thank you for limiting contact with others, wearing a simple mask to cover your cough, practice good hand hygiene habits and accessing our virtual services where possible to limit the spread of this virus.    For more information about COVID19 and options for caring for yourself at home, please visit the CDC website at https://www.cdc.gov/coronavirus/2019-ncov/about/steps-when-sick.html  For more options for care at [facility], please visit our website at [email or web address]    Protocols used: BREATHING DIFFICULTY-A-AH, CORONAVIRUS (2019-NCOV) EXPOSURE-A-AH

## 2020-03-20 NOTE — PROGRESS NOTES
"Date: 2020 00:17:12  Clinician: Michelle Singer  Clinician NPI: [id number]  Patient: Kailey Argueta  Patient : 2000  Patient Address: [address] [place] [address]  Patient Phone: [phone]  Visit Protocol: URI  Patient Summary:  Kailey is a 20 year old ( : 2000 ) female who initiated a Visit for COVID-19 (Coronavirus) evaluation and screening. When asked the question \"Please sign me up to receive news, health information and promotions from [employer].\", Kailey responded \"No\".    Kailey states her symptoms started 1-2 days ago.   Her symptoms consist of a headache, rhinitis, enlarged lymph nodes, myalgia, wheezing, a sore throat, a cough, nasal congestion, malaise, and chills. Kailey also feels feverish.   Symptom details     Nasal secretions: The color of her mucus is yellow, clear, and white.    Cough: Kailey coughs a few times an hour and her cough is not more bothersome at night. Phlegm comes into her throat when she coughs. She does not believe her cough is caused by post-nasal drip. The color of the phlegm is white.     Sore throat: Kailey reports having moderate throat pain (4-6 on a 10 point pain scale), does not have exudate on her tonsils, and can swallow liquids. The lymph nodes in her neck are enlarged. A rash has not appeared on the skin since the sore throat started.     Temperature: Her current temperature is 97.7 degrees Fahrenheit.     Wheezing: Kailey has not ever been diagnosed with asthma. The wheezing does not interfere with her normal daily activities.    Headache: She states the headache is moderate (4-6 on a 10 point pain scale).      Kailey denies having ear pain, facial pain or pressure, and teeth pain. She also denies having recent facial or sinus surgery in the past 60 days and taking antibiotic medication for the symptoms.   Precipitating events  Within the past week, Kailey has not been exposed to someone with strep throat. She has not recently been exposed " to someone with influenza. Kailey has been in close contact with the following high risk individuals: people with asthma, heart disease or diabetes, immunocompromised people, and adults 65 or older.   Pertinent COVID-19 (Coronavirus) information  Kailey has not traveled internationally or to the areas where COVID-19 (Coronavirus) is widespread, including cruise ship travel in the last 14 days before the start of her symptoms.   Kailey has not had a close contact with a laboratory-confirmed COVID-19 patient within 14 days of symptom onset. She has had a close contact with a suspected COVID-19 patient within 14 days of symptom onset. Additional information about contact with COVID-19 (Coronavirus) patient as reported by the patient (free text): We hung out within 3ft of eachother for about 6 hours, she was coughing a lot and we were in a car the entire time   Kailey is not a healthcare worker and does not work in a healthcare facility.   Triage Point(s) temporarily suspended for COVID-19 (Coronavirus) screening  Kailey reported the following symptoms which were previously protocol referral points. These protocol referral points have temporarily been removed for purposes of COVID-19 (Coronavirus) screening.   Difficulty breathing even when resting and can only speak in phrase(s)   Pertinent medical history  Kailey had 1 sinus infection within the past year.   Kailey does not get yeast infections when she takes antibiotics.   Kailey needs a return to work/school note.   Weight: 178 lbs   Kailey smokes or uses smokeless tobacco.   She denies pregnancy and denies breastfeeding. She has menstruated in the past month.   Weight: 178 lbs  A synchronous phone visit was initiated by the provider for the following reason: Clarify difficulty breathing    MEDICATIONS: No current medications, ALLERGIES: NKDA  Clinician Response:  Dear Kailey,      Based on the information you have provided, you do have symptoms that are consistent with  Coronavirus (COVID-19).  The coronavirus causes mild to severe respiratory illness with the most common symptoms including fever, cough and difficulty breathing. Unfortunately, many viruses cause similar symptoms and it can be difficult to distinguish between viruses, especially in mild cases, so we are presuming that anyone with cough or fever has coronavirus at this time.  Coronavirus/COVID-19 has reached the point of community spread in [place], meaning that we are finding the virus in people with no known exposure risk for madan the virus. Given the increasing commonness of coronavirus in the community we are no longer testing patients who are not critically ill.  If you are a health care worker, you should refer to your employee health office for instructions about returning to work.  For everyone else who has cough or fever, you should assume you are infected with coronavirus. Accordingly, you should self-quarantine for seven days from the first day your symptoms started OR 72 hours after your cough and fever completely resolve - WHICHEVER is LONGER. You should call if you find increasing shortness of breath, wheezing or sustained fever above 101.5. If you are significantly short of breath or experience chest pain you should call 911 or report to the nearest emergency department for urgent evaluation.    Isolate yourself at home.   Do Not allow any visitors  Do Not go to work or school  Do Not go to [employer],  centers, shopping, or other public places.  Do Not shake hands.  Avoid close contact with others (hugging, kissing).   Protect Others:    Cover Your Mouth and Nose with a mask, disposable tissue or wash cloth to avoid spreading germs to others.  Wash your hands and face frequently with soap and water.   If you develop significant shortness of breath that prevents you from doing normal activities, please call 911 or proceed to the nearest emergency room and alert them immediately that you  have been in self-isolation for possible coronavirus.   For more information about COVID19 and options for caring for yourself at home, please visit the CDC website at https://www.cdc.gov/coronavirus/2019-ncov/about/steps-when-sick.htmlFor more options for care at [facility], please visit our website at [email or web address]     Diagnosis: Cough  Diagnosis ICD: R05  Triage Notes: I reviewed the patient's history, verified their identity, and explained the Visit process.    No positive Covid-19 contact just presumed due to coughing of the individual.  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 She is able to speak in full sentences and no audible wheezing noted. She does not feel she needs immediate medical attention.  Synchronous Triage: phone, status: completed, duration: 185 seconds  Prescription: albuterol sulfate (Ventolin HFA) 90 mcg/actuation inhalation HFA aerosol inhaler 1 60 inhalation canister (ventolin hfa or equivalent), 0 days supply. Inhale 2 puffs every 4 hours as needed. Refills: 0, Refill as needed: no, Allow substitutions: yes  Pharmacy: [facility]/pharmacy #[id number] - [phone] - [id number] [place] [address]

## 2020-05-05 ENCOUNTER — NURSE TRIAGE (OUTPATIENT)
Dept: NURSING | Facility: CLINIC | Age: 20
End: 2020-05-05

## 2020-05-05 NOTE — TELEPHONE ENCOUNTER
"    Reason for Disposition    [1] Dry mouth AND [2] new onset AND [3] unexplained(Exceptions: recurrent ongoing problem or dry mouth from mild dehydration)    Additional Information    Negative: Severe difficulty breathing (e.g., struggling for each breath, speaks in single words, stridor)    Negative: Sounds like a life-threatening emergency to the triager    Negative: Injury to tooth or teeth    Negative: Injury to mouth    Negative: Canker sore suspected (i.e., aphthous ulcer) in the mouth    Negative: Cold sore suspected (i.e., fever blister sore) on the outer lip    Negative: Tooth is painful or swelling around a tooth    Negative: Mouth is painful    Negative: Throat is painful    Negative: Tongue swelling    Negative: Lip swelling    Negative: [1] Drooling or spitting out saliva (because can't swallow) AND [2] new onset    Negative: [1] Bleeding from mouth AND [2] won't stop after 10 minutes of direct pressure    Negative: Electrical burn of mouth    Negative: Chemical burn of mouth    Negative: [1] Difficulty breathing AND [2] not severe    Negative: Patient sounds very sick or weak to the triager    Negative: Receiving chemotherapy or radiation therapy    Negative: White patches that stick to tongue or inner cheek    Answer Assessment - Initial Assessment Questions  1. SYMPTOM: \"What's the main symptom you're concerned about?\" (e.g., dry mouth. chapped lips, lump)      dry  2. ONSET: \"When did the  dryness  start?\"      Past few days  3. PAIN: \"Is there any pain?\" If so, ask: \"How bad is it?\" (Scale: 1-10; mild, moderate, severe)      no  4. CAUSE: \"What do you think is causing the symptoms?\"      ? Feels like she could be dehydrated but is urinating often  5. OTHER SYMPTOMS: \"Do you have any other symptoms?\" (e.g., fever, sore throat, toothache, swelling)      Feels like she could \"nearly faint\"  6. PREGNANCY: \"Is there any chance you are pregnant?\" \"When was your last menstrual period?\"      " no    Protocols used: MOUTH SYMPTOMS-A-AH

## 2020-06-06 ENCOUNTER — MYC MEDICAL ADVICE (OUTPATIENT)
Dept: FAMILY MEDICINE | Facility: CLINIC | Age: 20
End: 2020-06-06

## 2020-06-10 ENCOUNTER — NURSE TRIAGE (OUTPATIENT)
Dept: NURSING | Facility: CLINIC | Age: 20
End: 2020-06-10

## 2020-06-11 ENCOUNTER — APPOINTMENT (OUTPATIENT)
Dept: CT IMAGING | Facility: CLINIC | Age: 20
End: 2020-06-11
Attending: EMERGENCY MEDICINE
Payer: COMMERCIAL

## 2020-06-11 ENCOUNTER — HOSPITAL ENCOUNTER (EMERGENCY)
Facility: CLINIC | Age: 20
Discharge: HOME | End: 2020-06-11
Attending: EMERGENCY MEDICINE | Admitting: EMERGENCY MEDICINE
Payer: COMMERCIAL

## 2020-06-11 ENCOUNTER — APPOINTMENT (OUTPATIENT)
Dept: ULTRASOUND IMAGING | Facility: CLINIC | Age: 20
End: 2020-06-11
Attending: EMERGENCY MEDICINE
Payer: COMMERCIAL

## 2020-06-11 VITALS
WEIGHT: 170 LBS | BODY MASS INDEX: 26.68 KG/M2 | HEART RATE: 59 BPM | TEMPERATURE: 98.4 F | SYSTOLIC BLOOD PRESSURE: 124 MMHG | DIASTOLIC BLOOD PRESSURE: 89 MMHG | RESPIRATION RATE: 18 BRPM | OXYGEN SATURATION: 99 % | HEIGHT: 67 IN

## 2020-06-11 DIAGNOSIS — R10.31 RLQ ABDOMINAL PAIN: ICD-10-CM

## 2020-06-11 LAB
ALBUMIN SERPL-MCNC: 4.1 G/DL (ref 3.4–5)
ALBUMIN UR-MCNC: 20 MG/DL
ALP SERPL-CCNC: 70 U/L (ref 40–150)
ALT SERPL W P-5'-P-CCNC: 22 U/L (ref 0–50)
ANION GAP SERPL CALCULATED.3IONS-SCNC: 9 MMOL/L (ref 3–14)
APPEARANCE UR: ABNORMAL
AST SERPL W P-5'-P-CCNC: 13 U/L (ref 0–45)
B-HCG FREE SERPL-ACNC: <5 IU/L
BACTERIA #/AREA URNS HPF: ABNORMAL /HPF
BASOPHILS # BLD AUTO: 0 10E9/L (ref 0–0.2)
BASOPHILS NFR BLD AUTO: 0.5 %
BILIRUB SERPL-MCNC: 0.9 MG/DL (ref 0.2–1.3)
BILIRUB UR QL STRIP: NEGATIVE
BUN SERPL-MCNC: 10 MG/DL (ref 7–30)
CALCIUM SERPL-MCNC: 8.8 MG/DL (ref 8.5–10.1)
CHLORIDE SERPL-SCNC: 109 MMOL/L (ref 94–109)
CO2 SERPL-SCNC: 23 MMOL/L (ref 20–32)
COLOR UR AUTO: YELLOW
CREAT SERPL-MCNC: 0.68 MG/DL (ref 0.52–1.04)
DIFFERENTIAL METHOD BLD: ABNORMAL
EOSINOPHIL # BLD AUTO: 0.5 10E9/L (ref 0–0.7)
EOSINOPHIL NFR BLD AUTO: 6 %
ERYTHROCYTE [DISTWIDTH] IN BLOOD BY AUTOMATED COUNT: 12.3 % (ref 10–15)
GFR SERPL CREATININE-BSD FRML MDRD: >90 ML/MIN/{1.73_M2}
GLUCOSE SERPL-MCNC: 93 MG/DL (ref 70–99)
GLUCOSE UR STRIP-MCNC: NEGATIVE MG/DL
HCT VFR BLD AUTO: 47.2 % (ref 35–47)
HGB BLD-MCNC: 15.9 G/DL (ref 11.7–15.7)
HGB UR QL STRIP: NEGATIVE
IMM GRANULOCYTES # BLD: 0 10E9/L (ref 0–0.4)
IMM GRANULOCYTES NFR BLD: 0.2 %
KETONES UR STRIP-MCNC: NEGATIVE MG/DL
LEUKOCYTE ESTERASE UR QL STRIP: ABNORMAL
LYMPHOCYTES # BLD AUTO: 3.3 10E9/L (ref 0.8–5.3)
LYMPHOCYTES NFR BLD AUTO: 37.6 %
MCH RBC QN AUTO: 30.9 PG (ref 26.5–33)
MCHC RBC AUTO-ENTMCNC: 33.7 G/DL (ref 31.5–36.5)
MCV RBC AUTO: 92 FL (ref 78–100)
MONOCYTES # BLD AUTO: 0.5 10E9/L (ref 0–1.3)
MONOCYTES NFR BLD AUTO: 5.3 %
MUCOUS THREADS #/AREA URNS LPF: PRESENT /LPF
NEUTROPHILS # BLD AUTO: 4.5 10E9/L (ref 1.6–8.3)
NEUTROPHILS NFR BLD AUTO: 50.4 %
NITRATE UR QL: NEGATIVE
NRBC # BLD AUTO: 0 10*3/UL
NRBC BLD AUTO-RTO: 0 /100
PH UR STRIP: 5.5 PH (ref 5–7)
PLATELET # BLD AUTO: 252 10E9/L (ref 150–450)
POTASSIUM SERPL-SCNC: 3.8 MMOL/L (ref 3.4–5.3)
PROT SERPL-MCNC: 7 G/DL (ref 6.8–8.8)
RBC # BLD AUTO: 5.14 10E12/L (ref 3.8–5.2)
RBC #/AREA URNS AUTO: 3 /HPF (ref 0–2)
SODIUM SERPL-SCNC: 141 MMOL/L (ref 133–144)
SOURCE: ABNORMAL
SP GR UR STRIP: 1.03 (ref 1–1.03)
SQUAMOUS #/AREA URNS AUTO: 11 /HPF (ref 0–1)
UROBILINOGEN UR STRIP-MCNC: NORMAL MG/DL (ref 0–2)
WBC # BLD AUTO: 8.9 10E9/L (ref 4–11)
WBC #/AREA URNS AUTO: 5 /HPF (ref 0–5)

## 2020-06-11 PROCEDURE — 25000128 H RX IP 250 OP 636: Performed by: EMERGENCY MEDICINE

## 2020-06-11 PROCEDURE — 80053 COMPREHEN METABOLIC PANEL: CPT | Performed by: EMERGENCY MEDICINE

## 2020-06-11 PROCEDURE — 96375 TX/PRO/DX INJ NEW DRUG ADDON: CPT

## 2020-06-11 PROCEDURE — 99285 EMERGENCY DEPT VISIT HI MDM: CPT | Mod: 25

## 2020-06-11 PROCEDURE — 96374 THER/PROPH/DIAG INJ IV PUSH: CPT | Mod: 59

## 2020-06-11 PROCEDURE — 76830 TRANSVAGINAL US NON-OB: CPT

## 2020-06-11 PROCEDURE — 25800030 ZZH RX IP 258 OP 636: Performed by: EMERGENCY MEDICINE

## 2020-06-11 PROCEDURE — 96361 HYDRATE IV INFUSION ADD-ON: CPT

## 2020-06-11 PROCEDURE — 81001 URINALYSIS AUTO W/SCOPE: CPT | Performed by: EMERGENCY MEDICINE

## 2020-06-11 PROCEDURE — 74177 CT ABD & PELVIS W/CONTRAST: CPT

## 2020-06-11 PROCEDURE — 85025 COMPLETE CBC W/AUTO DIFF WBC: CPT | Performed by: EMERGENCY MEDICINE

## 2020-06-11 PROCEDURE — 84702 CHORIONIC GONADOTROPIN TEST: CPT

## 2020-06-11 RX ORDER — SODIUM CHLORIDE 9 MG/ML
1000 INJECTION, SOLUTION INTRAVENOUS CONTINUOUS
Status: DISCONTINUED | OUTPATIENT
Start: 2020-06-11 | End: 2020-06-11 | Stop reason: HOSPADM

## 2020-06-11 RX ORDER — KETOROLAC TROMETHAMINE 15 MG/ML
15 INJECTION, SOLUTION INTRAMUSCULAR; INTRAVENOUS ONCE
Status: COMPLETED | OUTPATIENT
Start: 2020-06-11 | End: 2020-06-11

## 2020-06-11 RX ORDER — IOPAMIDOL 755 MG/ML
500 INJECTION, SOLUTION INTRAVASCULAR ONCE
Status: COMPLETED | OUTPATIENT
Start: 2020-06-11 | End: 2020-06-11

## 2020-06-11 RX ORDER — ONDANSETRON 2 MG/ML
4 INJECTION INTRAMUSCULAR; INTRAVENOUS
Status: DISCONTINUED | OUTPATIENT
Start: 2020-06-11 | End: 2020-06-11 | Stop reason: HOSPADM

## 2020-06-11 RX ADMIN — KETOROLAC TROMETHAMINE 15 MG: 15 INJECTION, SOLUTION INTRAMUSCULAR; INTRAVENOUS at 13:51

## 2020-06-11 RX ADMIN — SODIUM CHLORIDE 61 ML: 9 INJECTION, SOLUTION INTRAVENOUS at 15:47

## 2020-06-11 RX ADMIN — IOPAMIDOL 85 ML: 755 INJECTION, SOLUTION INTRAVENOUS at 15:43

## 2020-06-11 RX ADMIN — SODIUM CHLORIDE 1000 ML: 9 INJECTION, SOLUTION INTRAVENOUS at 13:40

## 2020-06-11 RX ADMIN — ONDANSETRON 4 MG: 2 INJECTION INTRAMUSCULAR; INTRAVENOUS at 13:51

## 2020-06-11 ASSESSMENT — ENCOUNTER SYMPTOMS
ABDOMINAL PAIN: 1
CHILLS: 0
FEVER: 0
APPETITE CHANGE: 1
NAUSEA: 1

## 2020-06-11 ASSESSMENT — MIFFLIN-ST. JEOR: SCORE: 1563.74

## 2020-06-11 NOTE — ED PROVIDER NOTES
"  History     Chief Complaint:  Abdominal Pain    HPI   Kailey Argueta is a 20 year old female who presents with ***    Allergies:  No Known Drug Allergies     Medications:    The patient is not currently taking any prescribed medications.    Past Medical History:    Migraines  ADHD  Anxiety  Bipolar disorder  IUD contraceptive     Past Surgical History:    D&C    Family History:    Diabetes  Hypertension  Cancer    Social History:  Positive for alcohol use.  Positive for drug use: Marijuana  Positive for tobacco use.  Marital Status:  Single      Review of Systems  ***    Physical Exam   First Vitals:  BP: (!) 140/97  Pulse: 57  Temp: 98.4  F (36.9  C)  Resp: 18  Height: 170.2 cm (5' 7\")  Weight: 77.1 kg (170 lb)  SpO2: 99 %      Physical Exam  ***    Emergency Department Course   ECG:  ***    Imaging:  {Radiographic findings?:406109693::\" \"}  ***    Laboratory:  ***    Procedures:  ***        Interventions:  ***  {Response to meds:548400::\" \"}    Emergency Department Course:  ***       Impression & Plan      {[facility] Quality Projects:773080}    {trauma activation?:805594::\" \"}  CMS Diagnoses: {Sepsis/Septic Shock/Stemi/Stroke:057525::\" \"}       Medical Decision Making:  ***  Critical Care time:  {none or minutes:224264::\"none\"}    Diagnosis:  No diagnosis found.    Disposition:  {discharged to home/discharged to home with.../Admitted to...:357936}    Discharge Medications:  New Prescriptions    No medications on file       Nicole Stubbs  6/11/2020   [facility] EMERGENCY DEPARTMENT    "

## 2020-06-11 NOTE — ED TRIAGE NOTES
Abdominal pain x 4 days.  No vomiting or diarrhea.  Went to [facility], they told her her appendix might burst.  ABCDs intact.

## 2020-06-11 NOTE — ED AVS SNAPSHOT
[facility] Emergency Department  [address]  [facility] [address]  Phone:  [phone]  Fax:  [phone]                                    Kailey Argueta   MRN: [id number]    Department:  [facility] Emergency Department   Date of Visit:  6/11/2020           After Visit Summary Signature Page    I have received my discharge instructions, and my questions have been answered. I have discussed any challenges I see with this plan with the nurse or doctor.    ..........................................................................................................................................  Patient/Patient Representative Signature      ..........................................................................................................................................  Patient Representative Print Name and Relationship to Patient    ..................................................               ................................................  Date                                   Time    ..........................................................................................................................................  Reviewed by Signature/Title    ...................................................              ..............................................  Date                                               Time          22EPIC Rev 08/18

## 2020-06-11 NOTE — DISCHARGE INSTRUCTIONS
Your labs and imaging (CT and ultrasound) are normal.    Follow up with your primary care doctor in the next 2-3 days regarding your visit today.    Return to the ER if you develop fever, worsening pain, or any new concerning symptoms.      Discharge Instructions  Abdominal Pain    Abdominal pain (belly pain) can be caused by many things. Your evaluation today does not show the exact cause for your pain. Your provider today has decided that it is unlikely your pain is due to a life threatening problem, or a problem requiring surgery or hospital admission. Sometimes those problems cannot be found right away, so it is very important that you follow up as directed.  Sometimes only the changes which occur over time allow the cause of your pain to be found.    Generally, every Emergency Department visit should have a follow-up clinic visit with either a primary or a specialty clinic/provider. Please follow-up as instructed by your emergency provider today. With abdominal pain, we often recommend very close follow-up, such as the following day.    ADULTS:  Return to the Emergency Department right away if:    You get an oral temperature above 102oF or as directed by your provider.  You have blood in your stools. This may be bright red or appear as black, tarry stools.    You keep vomiting (throwing up) or cannot drink liquids.  You see blood when you vomit.   You cannot have a bowel movement or you cannot pass gas.  Your stomach gets bloated or bigger.  Your skin or the whites of your eyes look yellow.  You faint.  You have bloody, frequent or painful urination (peeing).  You have new symptoms or anything that worries you.    CHILDREN:  Return to the Emergency Department right away if your child has any of the above-listed symptoms or the following:    Pushes your hand away or screams/cries when his/her belly is touched.  You notice your child is very fussy or weak.  Your child is very tired and is too tired to eat or  drink.  Your child is dehydrated.  Signs of dehydration can be:  Significant change in the amount of wet diapers/urine.  Your infant or child starts to have dry mouth and lips, or no saliva (spit) or tears.    PREGNANT WOMEN:  Return to the Emergency Department right away if you have any of the above-listed symptoms or the following:    You have bleeding, leaking fluid or passing tissue from the vagina.  You have worse pain or cramping, or pain in your shoulder or back.  You have vomiting that will not stop.  You have a temperature of 100oF or more.  Your baby is not moving as much as usual.  You faint.  You get a bad headache with or without eye problems and abdominal pain.  You have a seizure.  You have unusual discharge from your vagina and abdominal pain.    Abdominal pain is pretty common during pregnancy.  Your pain may or may not be related to your pregnancy. You should follow-up closely with your OB provider so they can evaluate you and your baby.  Until you follow-up with your regular provider, do the following:     Avoid sex and do not put anything in your vagina.  Drink clear fluids.  Only take medications approved by your provider.    MORE INFORMATION:    Appendicitis:  A possible cause of abdominal pain in any person who still has their appendix is acute appendicitis. Appendicitis is often hard to diagnose.  Testing does not always rule out early appendicitis or other causes of abdominal pain. Close follow-up with your provider and re-evaluations may be needed to figure out the reason for your abdominal pain.    Follow-up:  It is very important that you make an appointment with your clinic and go to the appointment.  If you do not follow-up with your primary provider, it may result in missing an important development which could result in permanent injury or disability and/or lasting pain.  If there is any problem keeping your appointment, call your provider or return to the Emergency  "Department.    Medications:  Take your medications as directed by your provider today.  Before using over-the-counter medications, ask your provider and make sure to take the medications as directed.  If you have any questions about medications, ask your provider.    Diet:  Resume your normal diet as much as possible, but do not eat fried, fatty or spicy foods while you have pain.  Do not drink alcohol or have caffeine.  Do not smoke tobacco.    Probiotics: If you have been given an antibiotic, you may want to also take a probiotic pill or eat yogurt with live cultures. Probiotics have \"good bacteria\" to help your intestines stay healthy. Studies have shown that probiotics help prevent diarrhea (loose stools) and other intestine problems (including C. diff infection) when you take antibiotics. You can buy these without a prescription in the pharmacy section of the store.     If you were given a prescription for medicine here today, be sure to read all of the information (including the package insert) that comes with your prescription.  This will include important information about the medicine, its side effects, and any warnings that you need to know about.  The pharmacist who fills the prescription can provide more information and answer questions you may have about the medicine.  If you have questions or concerns that the pharmacist cannot address, please call or return to the Emergency Department.       Remember that you can always come back to the Emergency Department if you are not able to see your regular provider in the amount of time listed above, if you get any new symptoms, or if there is anything that worries you.    "

## 2020-06-11 NOTE — ED PROVIDER NOTES
History     Chief Complaint:  abdominal pain    HPI  Kailey Argueta is a 20 year old female with a history of ovarian cysts who presents for evaluation of RLQ abdominal pain. She developed the pain 4 days ago initially on the left side but yesterday it worsened and moved to the RLQ. She describes this as a stabbing pain which waxes and wanes but is always present. Movement and eating make the pain worse and the only thing she has found to help is CBD oil. She also complains of some nausea. The patient was seen at the [facility] today and referred to the ED. She does note a decreased appetite but states this began several weeks ago. She denies any fever or chills. The patient also denies any vaginal discharge, bleeding, or any concern for STD's.       Allergies:  No known drug allergies    Medications:    The patient is not currently taking any prescribed medications.    Past Medical History:    Migraines  ADHD  Anxiety  Bipolar disorder  IUD contraceptive in place     Past Surgical History:    D&C     Family History:    Diabetes  Hypertension  Cancer     Social History:  Positive for alcohol use.  Positive for drug use: Marijuana  Positive for tobacco use.  Marital Status:  Single        Review of Systems   Constitutional: Positive for appetite change. Negative for chills and fever.   Gastrointestinal: Positive for abdominal pain and nausea.   Genitourinary: Negative for vaginal bleeding and vaginal discharge.   All other systems reviewed and are negative.      Physical Exam     Patient Vitals for the past 24 hrs:   BP Temp Temp src Pulse Resp SpO2 Height Weight   06/11/20 1530 124/89 -- -- 59 -- 99 % -- --   06/11/20 1515 127/82 -- -- 56 -- 100 % -- --   06/11/20 1500 125/75 -- -- (!) 47 -- 99 % -- --   06/11/20 1445 120/76 -- -- 53 -- 99 % -- --   06/11/20 1430 120/86 -- -- (!) 48 -- 100 % -- --   06/11/20 1400 (!) 131/96 -- -- 63 -- 100 % -- --   06/11/20 1345 128/80 -- -- 55 -- 97 % -- --  "  06/11/20 1237 (!) 140/97 98.4  F (36.9  C) Oral 57 18 99 % 1.702 m (5' 7\") 77.1 kg (170 lb)     Physical Exam    General: Alert, no acute distress; nontoxic appearing  HEENT:  Moist mucous membranes. Conjunctiva normal.   CV:  RRR, no m/r/g, skin warm and well perfused  Pulm:  CTAB, no wheezes/ronchi/rales.  No acute distress, breathing comfortably  GI:  Soft, RLQ/right pelvic tenderness, nondistended.  No rebound or guarding.  Normal bowel sounds  MSK:  Moving all extremities.  No focal areas of edema, erythema, or tenderness  Skin:  WWP, no rashes, no lower extremity edema, skin color normal, no diaphoresis  Psych:  Well-appearing, normal affect, regular speech    Emergency Department Course     Imaging:  Radiology findings were communicated with the patient who voiced understanding of the findings.    US Pelvic Complete w Transvaginal   IMPRESSION: Intrauterine device is somewhat low in position similar to   previous, consider repositioning. Otherwise no acute process   demonstrated  Reading per radiology    CT Abd/Pelvis, IV contrast only TRAUMA   1.  No acute abnormalities in the abdomen or pelvis. Specifically,  normal appendix.  2.  IUD, which appears to be within the lower uterine segment.  Allowing for differences in technique, this is unchanged since the  comparison ultrasounds.  Reading per radiology    Abd/pelvis CT,  IV  contrast only TRAUMA / AAA   Final Result   IMPRESSION:    1.  No acute abnormalities in the abdomen or pelvis. Specifically,   normal appendix.   2.  IUD, which appears to be within the lower uterine segment.   Allowing for differences in technique, this is unchanged since the   comparison ultrasounds.      MAZIN HERCULES MD      US Pelvic Complete w Transvaginal & Abd/Pel Duplex Limited   Final Result   IMPRESSION: Intrauterine device is somewhat low in position similar to   previous, consider repositioning. Otherwise no acute process   demonstrated.      ISABEL PRIETO MD    "     Laboratory:  Laboratory findings were communicated with the patient who voiced understanding of the findings.    CBC: (WBC 8.9, HGB 15.9 (H), )   CMP: Glucose 93, o/w WNL (Creatinine: 0.68)    UA: leukocyte small, protein albumin 20, RBC/HPF 3 (H), bacteria moderate, mucus present, o/w Negative    HCG Quantitative: <5.0    Interventions:  1340 NS 1L IV Bolus  1351 Zofran 4mg IV injection   1351 Toradol, 15 mg, IV    Emergency Department Course:  Past medical records, nursing notes, and vitals reviewed.  1312: I performed an exam of the patient and obtained history, as documented above.     IV was inserted and blood was drawn for laboratory testing, results above.    The patient was sent for a CT and US while in the emergency department, findings above    1512: I rechecked the patient. Explained findings to patient.    I personally reviewed the laboratory and imaging results with the Patient and answered all related questions prior to discharge.     Findings and plan explained to the Patient. Patient discharged home with instructions regarding supportive care, medications, and reasons to return. The importance of close follow-up was reviewed.     Impression & Plan      Medical Decision Making:  Kailey Argueta is a 20 year old female who presents to the emergency department today for evaluation of right lower quadrant abdominal pain.  Please see HPI for specifics.  Pain is migrated from the left side to the right side but symptoms started 4 days prior.  She is afebrile and hemodynamically stable.  She has mild right lower quadrant/pelvic tenderness.  Differential includes but is not limited to appendicitis, diverticulitis, constipation, inflammatory bowel, pelvic pathology such as ovarian cyst, ruptured cyst, torsion, endometriosis amongst others.  She denies concern for STI and no discharge.  Low suspicion for PID.  Patient is not pregnant, UA is normal.  Lab studies noncontributory.  Pelvic ultrasound  showed low position IUD, otherwise no acute abnormalities.  Doubt this is the cause for her pain.  Given unremarkable ultrasound, abdominal CT was obtained which fortunately was negative for appendicitis or other acute intra-abdominal pathology.  Unclear cause for patient's abdominal pain at this time but no life threat identified.  With reasonable clinical certainty she is safe to discharge home with PCP follow-up and she understands and agrees.  Recommend Tylenol/ibuprofen as needed for pain. Reasons to return the ER discussed and all questions answered.    Diagnosis:    ICD-10-CM    1. RLQ abdominal pain  R10.31        Disposition:   discharged to home    Discharge Medications:     Medication List      There are no discharge medications for this visit.         Scribe Disclosure:  I, Xi Vale, am serving as a scribe at 1:12 PM on 6/11/2020 to document services personally performed by Dawson Ko MD based on my observations and the provider's statements to me.    [facility] EMERGENCY DEPARTMENT     Dawson Ko MD  06/11/20 5751

## 2020-06-11 NOTE — TELEPHONE ENCOUNTER
"Patient reports she went to urgent care 3 days ago for abdominal pain on the left side. Reports 3-4 hours ago the pain moved from the left side to the right side. Currently rates pain constant about 7/10. Took Advil earlier which didn't help at all. The pain is always there. She just got over a level 10 pain episode ( reports pain as \"extremely sharp\") a few minutes ago which she reports she cried for 5 minutes. Is extremely nauseated.   Denies vomiting.   Denies fever.   Per protocol advised to be seen by a physician within 4 hours. Patient plans to [facility] Emergency department. Patient had no further questions.     Shahrzad Man RN/[facility] Nurse Advisors    COVID 19 Nurse Triage Plan/Patient Instructions    Please be aware that novel coronavirus (COVID-19) may be circulating in the community. If you develop symptoms such as fever, cough, or SOB or if you have concerns about the presence of another infection including coronavirus (COVID-19), please contact your health care provider or visit [email or web address].     Disposition/Instructions    Patient to go to ED and follow protocol based instructions.     Bring Your Own Device:  Please also bring your smart device(s) (smart phones, tablets, laptops) and their charging cables for your personal use and to communicate with your care team during your visit.    Thank you for taking steps to prevent the spread of this virus.  o Limit your contact with others.  o Wear a simple mask to cover your cough.  o Wash your hands well and often.    Resources    [facility]: About COVID-19: [email or web address]    CDC: What to Do If You're Sick: www.cdc.gov/coronavirus/2019-ncov/about/steps-when-sick.html    CDC: Ending Home Isolation: www.cdc.gov/coronavirus/2019-ncov/hcp/disposition-in-home-patients.html     CDC: Caring for Someone: www.cdc.gov/coronavirus/2019-ncov/if-you-are-sick/care-for-someone.html     [facility]: Interim Guidance for " "Hospital Discharge to Home: www.health.[facility].[place]/diseases/coronavirus/hcp/hospdischarge.pdf    [facility] clinical trials (COVID-19 research studies): clinicalaffairs.[facility]/umn-clinical-trials     Below are the COVID-19 hotlines at the [place] [facility] ([facility]). Interpreters are available.   o For health questions: Call 727-069-7446 or 1-122.186.7467 (7 a.m. to 7 p.m.)  o For questions about schools and childcare: Call 148-958-2073 or 1-618.597.8222 (7 a.m. to 7 p.m.)     Additional Information    Negative: Shock suspected (e.g., cold/pale/clammy skin, too weak to stand, low BP, rapid pulse)    Negative: Difficult to awaken or acting confused (e.g., disoriented, slurred speech)    Negative: Passed out (i.e., lost consciousness, collapsed and was not responding)    Negative: Sounds like a life-threatening emergency to the triager    Negative: Chest pain    Negative: Pain is mainly in upper abdomen  (if needed ask: \"is it mainly above the belly button?\")    Negative: Followed an abdomen (stomach) injury    Negative: [1] Abdominal pain AND [2] pregnant < 20 weeks    Negative: [1] Abdominal pain AND [2] pregnant > 20 weeks    Negative: [1] Abdominal pain AND [2] postpartum (from 0 to 6 weeks after delivery)    Negative: [1] SEVERE pain (e.g., excruciating) AND [2] present > 1 hour    Negative: [1] SEVERE pain AND [2] age > 60    Negative: [1] Vomiting AND [2] contains red blood or black (\"coffee ground\") material  (Exception: few red streaks in vomit that only happened once)    Negative: Blood in bowel movements   (Exception: blood on surface of BM with constipation)    Negative: Black or tarry bowel movements  (Exception: chronic-unchanged  black-grey bowel movements AND is taking iron pills or Pepto-bismol)    Negative: Patient sounds very sick or weak to the triager    [1] MILD-MODERATE pain AND [2] constant AND [3] present > 2 hours    Protocols used: ABDOMINAL PAIN - FEMALE-A-AH      "

## 2020-06-21 ENCOUNTER — TELEPHONE (OUTPATIENT)
Dept: BEHAVIORAL HEALTH | Facility: CLINIC | Age: 20
End: 2020-06-21

## 2020-06-21 ENCOUNTER — HOSPITAL ENCOUNTER (EMERGENCY)
Facility: CLINIC | Age: 20
Discharge: OTHER INSTITUTION | End: 2020-06-21
Attending: EMERGENCY MEDICINE | Admitting: EMERGENCY MEDICINE
Payer: COMMERCIAL

## 2020-06-21 VITALS
RESPIRATION RATE: 16 BRPM | DIASTOLIC BLOOD PRESSURE: 78 MMHG | HEART RATE: 78 BPM | BODY MASS INDEX: 26.59 KG/M2 | TEMPERATURE: 97.7 F | OXYGEN SATURATION: 100 % | SYSTOLIC BLOOD PRESSURE: 110 MMHG | WEIGHT: 169.75 LBS

## 2020-06-21 DIAGNOSIS — F12.20 CANNABIS DEPENDENCE (H): ICD-10-CM

## 2020-06-21 DIAGNOSIS — R45.851 SUICIDAL IDEATION: ICD-10-CM

## 2020-06-21 DIAGNOSIS — Z03.818 ENCNTR FOR OBS FOR SUSP EXPSR TO OTH BIOLG AGENTS RULED OUT: ICD-10-CM

## 2020-06-21 DIAGNOSIS — F33.2 SEVERE RECURRENT MAJOR DEPRESSION WITHOUT PSYCHOTIC FEATURES (H): ICD-10-CM

## 2020-06-21 DIAGNOSIS — F33.1 MODERATE EPISODE OF RECURRENT MAJOR DEPRESSIVE DISORDER (H): ICD-10-CM

## 2020-06-21 LAB
AMPHETAMINES UR QL SCN: NEGATIVE
BARBITURATES UR QL: NEGATIVE
BENZODIAZ UR QL: NEGATIVE
CANNABINOIDS UR QL SCN: POSITIVE
COCAINE UR QL: NEGATIVE
ETHANOL UR QL SCN: NEGATIVE
HCG UR QL: NEGATIVE
OPIATES UR QL SCN: NEGATIVE
SARS-COV-2 PCR COMMENT: NORMAL
SARS-COV-2 RNA SPEC QL NAA+PROBE: NEGATIVE
SARS-COV-2 RNA SPEC QL NAA+PROBE: NORMAL
SPECIMEN SOURCE: NORMAL
SPECIMEN SOURCE: NORMAL

## 2020-06-21 PROCEDURE — 99285 EMERGENCY DEPT VISIT HI MDM: CPT | Mod: Z6 | Performed by: EMERGENCY MEDICINE

## 2020-06-21 PROCEDURE — 81025 URINE PREGNANCY TEST: CPT | Performed by: FAMILY MEDICINE

## 2020-06-21 PROCEDURE — 25000132 ZZH RX MED GY IP 250 OP 250 PS 637: Performed by: EMERGENCY MEDICINE

## 2020-06-21 PROCEDURE — C9803 HOPD COVID-19 SPEC COLLECT: HCPCS

## 2020-06-21 PROCEDURE — 80307 DRUG TEST PRSMV CHEM ANLYZR: CPT | Performed by: FAMILY MEDICINE

## 2020-06-21 PROCEDURE — 99285 EMERGENCY DEPT VISIT HI MDM: CPT | Mod: 25

## 2020-06-21 PROCEDURE — 90791 PSYCH DIAGNOSTIC EVALUATION: CPT

## 2020-06-21 PROCEDURE — U0003 INFECTIOUS AGENT DETECTION BY NUCLEIC ACID (DNA OR RNA); SEVERE ACUTE RESPIRATORY SYNDROME CORONAVIRUS 2 (SARS-COV-2) (CORONAVIRUS DISEASE [COVID-19]), AMPLIFIED PROBE TECHNIQUE, MAKING USE OF HIGH THROUGHPUT TECHNOLOGIES AS DESCRIBED BY CMS-2020-01-R: HCPCS | Performed by: EMERGENCY MEDICINE

## 2020-06-21 PROCEDURE — 80320 DRUG SCREEN QUANTALCOHOLS: CPT | Performed by: FAMILY MEDICINE

## 2020-06-21 RX ORDER — ALBUTEROL SULFATE 90 UG/1
1-2 AEROSOL, METERED RESPIRATORY (INHALATION) EVERY 4 HOURS PRN
COMMUNITY
End: 2020-06-21

## 2020-06-21 RX ORDER — HYDROXYZINE HYDROCHLORIDE 50 MG/1
50 TABLET, FILM COATED ORAL ONCE
Status: COMPLETED | OUTPATIENT
Start: 2020-06-21 | End: 2020-06-21

## 2020-06-21 RX ADMIN — HYDROXYZINE HYDROCHLORIDE 50 MG: 50 TABLET, FILM COATED ORAL at 16:17
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
## 2020-06-21 NOTE — LETTER
June 22, 2020        Kailey Argueta  [address] [address] [facility] [address]    This letter provides a written record that you were tested for COVID-19 on 6/21/20.       Your result was negative. This means that we didn t find the virus that causes COVID-19 in your sample. A test may show negative when you do actually have the virus. This can happen when the virus is in the early stages of infection, before you feel illness symptoms.    If you have symptoms   Stay home and away from others (self-isolate) until you meet ALL of the guidelines below:    You ve had no fever--and no medicine that reduces fever--for 3 full days (72 hours). And      Your other symptoms have gotten better. For example, your cough or breathing has improved. And     At least 10 days have passed since your symptoms started.    During this time:    Stay home. Don t go to work, school or anywhere else.     Stay in your own room, including for meals. Use your own bathroom if you can.    Stay away from others in your home. No hugging, kissing or shaking hands. No visitors.    Clean  high touch  surfaces often (doorknobs, counters, handles, etc.). Use a household cleaning spray or wipes. You can find a full list on the EPA website at www.epa.gov/pesticide-registration/list-n-disinfectants-use-against-sars-cov-2.    Cover your mouth and nose with a mask, tissue or washcloth to avoid spreading germs.    Wash your hands and face often with soap and water.    Going back to work  Check with your employer for any guidelines to follow for going back to work.    Employers: This document serves as formal notice that your employee tested negative for COVID-19, as of the testing date shown above.
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
## 2020-06-21 NOTE — PHARMACY-ADMISSION MEDICATION HISTORY
Admission Medication History Completed by Pharmacy    See [employer] admission navigator for preferred outpatient pharmacy and prior to admission medications.    Medication History Sources:     Outpatient pharmacy dispense records ([facility] in [place]) via Epic Surescripts data    Patient interview (via iPad due to coronavirus precautions)    Changes made to PTA medication list (reason):    Added: IUD (per pt report)    Deleted: none    Changed: none    Additional Information:    Patient denies being prescribed any medications or taking any over-the-counter (OTC) products such as vitamins, supplements, sleep aids, etc.      Per fill records, albuterol inhaler was filled 3/23/2020 but patient states therapy completed, not taking. Medication not added to list.    Pt confirmed NKDA but stated she is allergic to kiwi fruit, with mild throat swelling. Added to allergy list.    Prior to Admission medications    Medication Sig Last Dose     levonorgestrel (MIRENA) 20 MCG/24HR IUD   1 each by Intrauterine route once April 2019       Time spent in this activity: 10 minutes  Date completed: 06/21/20    Medication history completed by:   Dali Ayers, Pharm.D., BCPP  Behavioral Health Inpatient Pharmacist  [facility] Emergency Department  Phone: *[phone] ([email or web address]) or [phone]
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
## 2020-06-26 NOTE — PROGRESS NOTES
Pre-Visit Planning     Future Appointments   Date Time Provider Department Center   6/29/2020 10:30 AM Randi Castellanos PA-C [facility]     Arrival Time for this Appointment: 10:20 AM   Appointment Notes for this encounter:   [facility]     Questionnaires Reviewed/Assigned  No additional questionnaires are needed    Patient preferred phone number: [phone]      Spoke to patient via phone. Are there any additional questions or concerns you'd like to review with your provider during your visit? No    Patient does not have additional questions or concerns.        Visit is not preventive.    Meds  Is there anything on your medication list that needs to be updated? No    Current Outpatient Medications   Medication     levonorgestrel (MIRENA) 20 MCG/24HR IUD     No current facility-administered medications for this visit.      Which pharmacy do you prefer to use for medications during this visit if needed? [facility]    Do you need refills on any of your medications? No    Health Maintenance Due   Topic Date Due     PREVENTIVE CARE VISIT  1998     ANNUAL REVIEW OF HM ORDERS  1998     DTAP/TDAP/TD IMMUNIZATION (1 - Tdap) 02/09/2005     HPV IMMUNIZATION (3 - 2-dose series) 02/09/2012     HIV SCREENING  02/09/2013     PNEUMOCOCCAL IMMUNIZATION 19-64 MEDIUM RISK (1 of 1 - PPSV23) 02/09/2017     PAP  02/09/2019     Patient is due for:  pap  No appointment needed.    MyChart  Patient is active on [employer].  Radha Lal RN

## 2020-06-29 ENCOUNTER — OFFICE VISIT (OUTPATIENT)
Dept: FAMILY MEDICINE | Facility: CLINIC | Age: 20
End: 2020-06-29
Payer: COMMERCIAL

## 2020-06-29 VITALS
WEIGHT: 175 LBS | DIASTOLIC BLOOD PRESSURE: 71 MMHG | BODY MASS INDEX: 27.47 KG/M2 | HEART RATE: 93 BPM | RESPIRATION RATE: 16 BRPM | SYSTOLIC BLOOD PRESSURE: 114 MMHG | HEIGHT: 67 IN | TEMPERATURE: 98.6 F

## 2020-06-29 DIAGNOSIS — R10.84 ABDOMINAL PAIN, GENERALIZED: ICD-10-CM

## 2020-06-29 DIAGNOSIS — Z23 NEED FOR MMR VACCINE: ICD-10-CM

## 2020-06-29 DIAGNOSIS — R06.2 WHEEZING: ICD-10-CM

## 2020-06-29 DIAGNOSIS — Z23 NEED FOR TDAP VACCINATION: ICD-10-CM

## 2020-06-29 DIAGNOSIS — F39 MOOD DISORDER (H): Primary | ICD-10-CM

## 2020-06-29 DIAGNOSIS — K21.00 GASTROESOPHAGEAL REFLUX DISEASE WITH ESOPHAGITIS: ICD-10-CM

## 2020-06-29 PROCEDURE — 90471 IMMUNIZATION ADMIN: CPT | Performed by: PHYSICIAN ASSISTANT

## 2020-06-29 PROCEDURE — 99214 OFFICE O/P EST MOD 30 MIN: CPT | Mod: 25 | Performed by: PHYSICIAN ASSISTANT

## 2020-06-29 PROCEDURE — 90715 TDAP VACCINE 7 YRS/> IM: CPT | Performed by: PHYSICIAN ASSISTANT

## 2020-06-29 PROCEDURE — 90707 MMR VACCINE SC: CPT | Performed by: PHYSICIAN ASSISTANT

## 2020-06-29 PROCEDURE — 90472 IMMUNIZATION ADMIN EACH ADD: CPT | Performed by: PHYSICIAN ASSISTANT

## 2020-06-29 RX ORDER — HYDROXYZINE PAMOATE 50 MG/1
50 CAPSULE ORAL
COMMUNITY
Start: 2020-06-24 | End: 2024-07-22

## 2020-06-29 RX ORDER — ALBUTEROL SULFATE 90 UG/1
AEROSOL, METERED RESPIRATORY (INHALATION)
COMMUNITY
Start: 2020-03-23 | End: 2020-12-16

## 2020-06-29 RX ORDER — GABAPENTIN 300 MG/1
300 CAPSULE ORAL 2 TIMES DAILY
COMMUNITY
Start: 2020-06-25 | End: 2023-08-02

## 2020-06-29 RX ORDER — ALBUTEROL SULFATE 90 UG/1
2 AEROSOL, METERED RESPIRATORY (INHALATION) EVERY 4 HOURS PRN
Qty: 1 INHALER | Refills: 1 | Status: SHIPPED | OUTPATIENT
Start: 2020-06-29 | End: 2021-03-02

## 2020-06-29 RX ORDER — DOCUSATE SODIUM 100 MG/1
100 CAPSULE, LIQUID FILLED ORAL
COMMUNITY
Start: 2020-06-24 | End: 2020-12-16

## 2020-06-29 RX ORDER — PSYLLIUM HUSK 0.4 G
CAPSULE ORAL
COMMUNITY
Start: 2020-06-24 | End: 2020-12-16

## 2020-06-29 ASSESSMENT — MIFFLIN-ST. JEOR: SCORE: 1586.42

## 2020-06-29 NOTE — PROGRESS NOTES
Subjective     Kailey Argueta is a 20 year old female who presents to clinic today for the following health issues:    HPI     Hospital Follow-up Visit:    Hospital/Nursing Home/IP Rehab Facility: [facility]  Date of Admission: 6/21/2020  Date of Discharge:   Reason(s) for Admission: Suicidal ideation       Was your hospitalization related to COVID-19? No   Problems taking medications regularly:  None  Medication changes since discharge: None  Problems adhering to non-medication therapy:  None    Summary of hospitalization:  [facility] discharge summary reviewed  Diagnostic Tests/Treatments reviewed.  Follow up needed:   Other Healthcare Providers Involved in Patient s Care:         Psychiatry, Psychology, OBGYN  Update since discharge: improved. Post Discharge Medication Reconciliation: discharge medications reconciled, continue medications without change.  Plan of care communicated with patient              Patient having IUD out thru OBGYN this week.  appt set up with counselor and Psychiatrist.     GERD/Heartburn  Onset: 2 montyhs    Description:     Burning in chest: no    Intensity: moderate    Progression of Symptoms: intermittent    Accompanying Signs & Symptoms:  Does it feel like food gets stuck: no  Nausea: YES  Vomiting (bloody?): no  Abdominal Pain: YES  Black-Tarry stools: no:  Bloody stools: no    History:   Previous ulcers: no    Precipitating factors:   Caffeine use: YES  Alcohol use: no  NSAID/Aspirin use: no  Tobacco use: no  Worse with no particular food or drink.    Alleviating factors:      Therapies Tried and outcome:none       Patient Active Problem List   Diagnosis     CARDIOVASCULAR SCREENING; LDL GOAL LESS THAN 130     Panic attack     Migraine without status migrainosus, not intractable, unspecified migraine type     Past Surgical History:   Procedure Laterality Date     AS INDUCED ABORTN BY D&C  01/2019    16 weeks. PP       Social History     Tobacco Use      "Smoking status: Current Every Day Smoker     Types: Cigarettes     Smokeless tobacco: Never Used     Tobacco comment: 5 cigs per day   Substance Use Topics     Alcohol use: Not Currently     Alcohol/week: 0.0 standard drinks     Comment: once a month     Family History   Problem Relation Age of Onset     Diabetes Mother      Hypertension Mother      Skin Cancer Mother      Diabetes Father            Reviewed and updated as needed this visit by Provider         Review of Systems   Constitutional, HEENT, cardiovascular, pulmonary, gi and gu systems are negative, except as otherwise noted.      Objective    /71 (BP Location: Right arm, Patient Position: Chair, Cuff Size: Adult Large)   Pulse 93   Temp 98.6  F (37  C) (Tympanic)   Resp 16   Ht 1.702 m (5' 7\")   Wt 79.4 kg (175 lb)   BMI 27.41 kg/m    Body mass index is 27.41 kg/m .  Physical Exam   GENERAL APPEARANCE: healthy, alert and no distress  RESP: lungs clear to auscultation - no rales, rhonchi or wheezes  CV: regular rates and rhythm, normal S1 S2, no S3 or S4 and no murmur, click or rub  ABDOMEN: soft, nontender, without hepatosplenomegaly or masses and bowel sounds normal  NEURO: Normal strength and tone, mentation intact and speech normal  PSYCH: mentation appears normal and affect flat            Assessment & Plan     1. Mood disorder (H)  Continue plan to follow-up with Psychology and Psychiatry    2. Abdominal pain, generalized  Due to IUD, having it removed this week    3. Gastroesophageal reflux disease with esophagitis  Risks/benefits of medication use discussed with patient. Patient reports understanding and accepts trial of medication.    - omeprazole (PRILOSEC) 20 MG DR capsule; Take 1 capsule (20 mg) by mouth daily  Dispense: 90 capsule; Refill: 1    4. Wheezing    - albuterol (PROAIR HFA/PROVENTIL HFA/VENTOLIN HFA) 108 (90 Base) MCG/ACT inhaler; Inhale 2 puffs into the lungs every 4 hours as needed for shortness of breath / dyspnea or " "wheezing  Dispense: 1 Inhaler; Refill: 1    5. Need for Tdap vaccination    - VACCINE ADMINISTRATION, INITIAL    6. Need for MMR vaccine  Due for MMR       Tobacco Cessation:   reports that she has been smoking cigarettes. She has never used smokeless tobacco.  Tobacco Cessation Action Plan: Information offered: Patient not interested at this time      BMI:   Estimated body mass index is 27.41 kg/m  as calculated from the following:    Height as of this encounter: 1.702 m (5' 7\").    Weight as of this encounter: 79.4 kg (175 lb).   Weight management plan: Discussed healthy diet and exercise guidelines        See Patient Instructions    Return in about 1 year (around 6/29/2021) for Physical Exam.    Randi Castellanos PA-C  [facility]    "

## 2020-07-28 ENCOUNTER — TRANSFERRED RECORDS (OUTPATIENT)
Dept: HEALTH INFORMATION MANAGEMENT | Facility: CLINIC | Age: 20
End: 2020-07-28

## 2020-07-28 ENCOUNTER — TRANSFERRED RECORDS (OUTPATIENT)
Dept: MULTI SPECIALTY CLINIC | Facility: CLINIC | Age: 20
End: 2020-07-28

## 2020-07-28 ENCOUNTER — NURSE TRIAGE (OUTPATIENT)
Dept: NURSING | Facility: CLINIC | Age: 20
End: 2020-07-28

## 2020-07-28 LAB — PAP SMEAR - HIM PATIENT REPORTED: NEGATIVE

## 2020-07-29 NOTE — TELEPHONE ENCOUNTER
Pt reports receiving her first Depo injection this morning at 8:30AM.  30 minutes ago she suddenly developed moderate-severe R groin and RLQ abdominal pain.  Pt questions if this is from the Depo.      PCP [facility] [place]    RN advised pt to connect with her PCP care team.  She verbalized understanding and had no further questions.  RN connected call with [facility] answering service at [phone] who will speak with patient.     Malou Prasad RN/[facility]      Reason for Disposition    [1] Caller has URGENT question AND [2] triager unable to answer question    Additional Information    Negative: [1] SEVERE abdominal pain (e.g., excruciating) AND [2] present > 1 hour    Negative: SEVERE vaginal bleeding (i.e., soaking 2 pads or tampons per hour and present 2 or more hours)    Negative: Patient sounds very sick or weak to the triager    Negative: MODERATE vaginal bleeding (i.e., soaking 1 pad or tampon per hour and present > 6 hours)    Negative: [1] Constant abdominal pain AND [2] present > 2 hours    Protocols used: CONTRACEPTION - IMPLANT SYMPTOMS AND [id number]-FI

## 2020-08-06 ENCOUNTER — TRANSCRIBE ORDERS (OUTPATIENT)
Dept: OTHER | Age: 20
End: 2020-08-06

## 2020-08-06 DIAGNOSIS — D58.2 ELEVATED HEMOGLOBIN (H): Primary | ICD-10-CM

## 2020-08-06 DIAGNOSIS — D68.9 BLOOD CLOTTING DISORDER (H): ICD-10-CM

## 2020-08-12 ENCOUNTER — DOCUMENTATION ONLY (OUTPATIENT)
Dept: ONCOLOGY | Facility: CLINIC | Age: 20
End: 2020-08-12

## 2020-08-12 NOTE — PROGRESS NOTES
RECORDS STATUS - ALL OTHER DIAGNOSIS      RECORDS RECEIVED FROM: Yuliana TRIPP/Lennie Saucedo   DATE RECEIVED:    NOTES STATUS DETAILS   OFFICE NOTE from referring provider Requested 8/12 [facility]   OFFICE NOTE from medical oncologist     DISCHARGE SUMMARY from hospital     DISCHARGE REPORT from the ER     OPERATIVE REPORT     MEDICATION LIST     CLINICAL TRIAL TREATMENTS TO DATE     LABS     PATHOLOGY REPORTS     ANYTHING RELATED TO DIAGNOSIS Requested 8/12 [facility]   GENONOMIC TESTING     TYPE:     IMAGING (NEED IMAGES & REPORT)     CT SCANS     MRI     MAMMO     ULTRASOUND     PET

## 2020-08-17 NOTE — TELEPHONE ENCOUNTER
ONCOLOGY INTAKE: Records Information      APPT INFORMATION:  Referring provider:   [facility]   Referring provider s clinic:  [place]  Reason for visit/diagnosis: Elevated hemoglobin;Elevated hemoglobin  Has patient been notified of appointment date and time?: Yes    RECORDS INFORMATION:  Were the records received with the referral (via [employer])? Yes    Has patient been seen for any external appt for this diagnosis? Yes    If yes, where?[facility]     Has patient had any imaging or procedures outside of [place] for this condition? Yes      If Yes, where? [facility]     ADDITIONAL INFORMATION:  None

## 2020-08-25 ENCOUNTER — TELEPHONE (OUTPATIENT)
Dept: FAMILY MEDICINE | Facility: CLINIC | Age: 20
End: 2020-08-25

## 2020-08-25 NOTE — TELEPHONE ENCOUNTER
Patient Quality Outreach Summary      Summary:    Patient is due/failing the following:   Cervical Cancer Screening - PAP Needed    Type of outreach:    up to date routed to abstract    Questions for provider review:    None                                                                                                                    RACHEL Mejia       Chart routed to Care Team.

## 2020-09-15 ENCOUNTER — PRE VISIT (OUTPATIENT)
Dept: ONCOLOGY | Facility: CLINIC | Age: 20
End: 2020-09-15

## 2020-09-15 ENCOUNTER — VIRTUAL VISIT (OUTPATIENT)
Dept: ONCOLOGY | Facility: CLINIC | Age: 20
End: 2020-09-15
Attending: INTERNAL MEDICINE
Payer: COMMERCIAL

## 2020-09-15 DIAGNOSIS — D58.2 ELEVATED HEMOGLOBIN (H): Primary | ICD-10-CM

## 2020-09-15 PROCEDURE — 99203 OFFICE O/P NEW LOW 30 MIN: CPT | Mod: 95 | Performed by: INTERNAL MEDICINE

## 2020-09-15 PROCEDURE — 40001009 ZZH VIDEO/TELEPHONE VISIT; NO CHARGE

## 2020-09-15 NOTE — PROGRESS NOTES
"Kailey Argueta is a 20 year old adult who is being evaluated via a billable video visit.      The patient has been notified of following:     \"This video visit will be conducted via a call between you and your physician/provider. We have found that certain health care needs can be provided without the need for an in-person physical exam.  This service lets us provide the care you need with a video conversation.  If a prescription is necessary we can send it directly to your pharmacy.  If lab work is needed we can place an order for that and you can then stop by our lab to have the test done at a later time.    Video visits are billed at different rates depending on your insurance coverage.  Please reach out to your insurance provider with any questions.    If during the course of the call the physician/provider feels a video visit is not appropriate, you will not be charged for this service.\"    Patient has given verbal consent for Video visit? Yes  How would you like to obtain your AVS? MyChart  If you are dropped from the video visit, the video invite should be resent to: Text to cell phone: [employer] TEXT INVITE [phone]      WELL TEXT INVITE [phone]     Will anyone else be joining your video visit? No       Rhina Canas CMA      Video-Visit Details    Type of service:  Video Visit    Video Start Time: 8:15 am  Video End Time: 8:39 AM    Originating Location (pt. Location): Home    Distant Location (provider location):  [facility]     Platform used for Video Visit: [facility]    Hematology/Oncology New Patient Note      Today's Date: 09/15/20    Reason for Consult: Elevated hemoglobin      HISTORY OF PRESENT ILLNESS: Kailey Argueta is a 20 year old adult with PMHx of HTN, bipolar 1 disorder, anxiety, ADHD, who presents with elevated hemoglobin.   Annalisa says that she feels tired.  Annalisa does smoke about 3-4 cigarettes a day for the past couple of years.  Annalisa " denies having sleep apnea, but notes having family history of it.    On chart review, labs show a mildly elevated hemoglobin in the 15-16 range since September 2019.  In May 2019, CBC was completely normal.          REVIEW OF SYSTEMS:   14 point ROS was reviewed and is negative other than as noted above in HPI.       HOME MEDICATIONS:  Current Outpatient Medications   Medication Sig Dispense Refill     albuterol (PROAIR HFA/PROVENTIL HFA/VENTOLIN HFA) 108 (90 Base) MCG/ACT inhaler Inhale 2 puffs into the lungs every 4 hours as needed for shortness of breath / dyspnea or wheezing 1 Inhaler 1     docusate sodium (COLACE) 100 MG capsule Take 100 mg by mouth       gabapentin (NEURONTIN) 300 MG capsule Take 300 mg by mouth       hydrOXYzine (VISTARIL) 50 MG capsule Take 50 mg by mouth       MELATONIN MAXIMUM STRENGTH 5 MG tablet        omeprazole (PRILOSEC) 20 MG DR capsule Take 1 capsule (20 mg) by mouth daily 90 capsule 1     VENTOLIN  (90 Base) MCG/ACT inhaler TAKE 2 PUFFS BY MOUTH EVERY 4 HOURS AS NEEDED           ALLERGIES:  Allergies   Allergen Reactions     Kiwi Other (See Comments)     Mild throat swelling per pt; able to have artificially sweetened kiwi flavor         PAST MEDICAL HISTORY:  Past Medical History:   Diagnosis Date     ADHD (attention deficit hyperactivity disorder)      Anxiety      Bipolar 1 disorder (H)      Headache      Hypertension      IUD contraception 01/2019    post ab         PAST SURGICAL HISTORY:  Past Surgical History:   Procedure Laterality Date     AS INDUCED ABORTN BY D&C  01/2019    16 weeks. PP         SOCIAL HISTORY:  Social History     Socioeconomic History     Marital status: Single     Spouse name: Not on file     Number of children: Not on file     Years of education: Not on file     Highest education level: Not on file   Occupational History     Not on file   Social Needs     Financial resource strain: Not on file     Food insecurity     Worry: Not on file      Inability: Not on file     Transportation needs     Medical: Not on file     Non-medical: Not on file   Tobacco Use     Smoking status: Current Every Day Smoker     Types: Cigarettes     Smokeless tobacco: Never Used     Tobacco comment: 5 cigs per day   Substance and Sexual Activity     Alcohol use: Not Currently     Alcohol/week: 0.0 standard drinks     Comment: once a month     Drug use: Yes     Frequency: 7.0 times per week     Types: Marijuana     Comment: daily     Sexual activity: Yes     Partners: Female, Male     Birth control/protection: I.U.D.     Comment: L[facility] 2019   Lifestyle     Physical activity     Days per week: Not on file     Minutes per session: Not on file     Stress: Not on file   Relationships     Social connections     Talks on phone: Not on file     Gets together: Not on file     Attends Sikhism service: Not on file     Active member of club or organization: Not on file     Attends meetings of clubs or organizations: Not on file     Relationship status: Not on file     Intimate partner violence     Fear of current or ex partner: Not on file     Emotionally abused: Not on file     Physically abused: Not on file     Forced sexual activity: Not on file   Other Topics Concern     Parent/sibling w/ CABG, MI or angioplasty before 65F 55M? Not Asked   Social History Narrative     Not on file         FAMILY HISTORY:  Family History   Problem Relation Age of Onset     Diabetes Mother      Hypertension Mother      Skin Cancer Mother      Diabetes Father          PHYSICAL EXAM:  Vital signs:  There were no vitals taken for this visit.   ECO  GENERAL/CONSTITUTIONAL: No acute distress. Healthy, alert.  EYES: No scleral icterus.  No redness or discharge.    RESPIRATORY: No audible wheeze, cough, or visible cyanosis.  No visible retractions or increased work of breathing.  Able to speak fully in complete sentences.  MUSCULOSKELETAL: Normal range of motion.  NEUROLOGIC: Alert, oriented,  answers questions appropriately. No tremor. Mentation intact and speech normal  INTEGUMENTARY: No jaundice.  No obvious rash or skin lesions.  PSYCHIATRIC:  Mentation appears normal, affect normal/bright, judgement and insight intact, normal speech and appearance well-groomed.    The rest of a comprehensive physical exam is deferred due to public health emergency video visit restrictions.        LABS:  CBC RESULTS:   Recent Labs   Lab Test 06/11/20  1324   WBC 8.9   RBC 5.14   HGB 15.9*   HCT 47.2*   MCV 92   MCH 30.9   MCHC 33.7   RDW 12.3          Recent Labs   Lab Test 06/11/20  1324 02/08/20  2053    140   POTASSIUM 3.8 3.9   CHLORIDE 109 110*   CO2 23 26   ANIONGAP 9 4   GLC 93 100*   BUN 10 11   CR 0.68 0.67   GUME 8.8 9.1         ASSESSMENT/PLAN:  Kailey Argueta is a 20 year old adult with:    Elevated hemoglobin: We discussed possible causes, including smoking.  I advised complete smoking cessation.  We will check labs, including iron, ferritin, erythropoietin level, and myeloproliferative panel to rule out a primary myeloproliferative disorder, but suspect that this mild hemoglobin increase is due to smoking.  Of note, CT abdomen/pelvis on 6/11/20 showed no hepatosplenomegaly.  -labs, including CBC, CMP, peripheral smear, iron, ferritin, erythropoietin, myeloproliferative panel  -RTC ~2 weeks after labs are done to review results                Jenny Rodriguez MD  Hematology/Oncology  [facility]

## 2020-09-15 NOTE — LETTER
"    9/15/2020         RE: Kailey Argueta  [id number]  [place] [address]        Dear Colleague,    Thank you for referring your patient, Kailey Argueta, to the [facility]. Please see a copy of my visit note below.    Kailey Argueta is a 20 year old adult who is being evaluated via a billable video visit.      The patient has been notified of following:     \"This video visit will be conducted via a call between you and your physician/provider. We have found that certain health care needs can be provided without the need for an in-person physical exam.  This service lets us provide the care you need with a video conversation.  If a prescription is necessary we can send it directly to your pharmacy.  If lab work is needed we can place an order for that and you can then stop by our lab to have the test done at a later time.    Video visits are billed at different rates depending on your insurance coverage.  Please reach out to your insurance provider with any questions.    If during the course of the call the physician/provider feels a video visit is not appropriate, you will not be charged for this service.\"    Patient has given verbal consent for Video visit? Yes  How would you like to obtain your AVS? MyChart  If you are dropped from the video visit, the video invite should be resent to: Text to cell phone: [employer] TEXT INVITE [phone]      [employer] TEXT INVITE [phone]     Will anyone else be joining your video visit? No       Rhina Canas CMA      Video-Visit Details    Type of service:  Video Visit    Video Start Time: 8:15 am  Video End Time: 8:39 AM    Originating Location (pt. Location): Home    Distant Location (provider location):  [facility]     Platform used for Video Visit: [employer]      [facility]    Hematology/Oncology New Patient Note      Today's Date: 09/15/20    Reason for Consult: Elevated hemoglobin      HISTORY OF PRESENT ILLNESS: Kailey" AGUILA Argueta is a 20 year old adult with PMHx of HTN, bipolar 1 disorder, anxiety, ADHD, who presents with elevated hemoglobin.   Annalisa says that she feels tired.  Annalisa does smoke about 3-4 cigarettes a day for the past couple of years.  Annalisa denies having sleep apnea, but notes having family history of it.    On chart review, labs show a mildly elevated hemoglobin in the 15-16 range since September 2019.  In May 2019, CBC was completely normal.          REVIEW OF SYSTEMS:   14 point ROS was reviewed and is negative other than as noted above in HPI.       HOME MEDICATIONS:  Current Outpatient Medications   Medication Sig Dispense Refill     albuterol (PROAIR HFA/PROVENTIL HFA/VENTOLIN HFA) 108 (90 Base) MCG/ACT inhaler Inhale 2 puffs into the lungs every 4 hours as needed for shortness of breath / dyspnea or wheezing 1 Inhaler 1     docusate sodium (COLACE) 100 MG capsule Take 100 mg by mouth       gabapentin (NEURONTIN) 300 MG capsule Take 300 mg by mouth       hydrOXYzine (VISTARIL) 50 MG capsule Take 50 mg by mouth       MELATONIN MAXIMUM STRENGTH 5 MG tablet        omeprazole (PRILOSEC) 20 MG DR capsule Take 1 capsule (20 mg) by mouth daily 90 capsule 1     VENTOLIN  (90 Base) MCG/ACT inhaler TAKE 2 PUFFS BY MOUTH EVERY 4 HOURS AS NEEDED           ALLERGIES:  Allergies   Allergen Reactions     Kiwi Other (See Comments)     Mild throat swelling per pt; able to have artificially sweetened kiwi flavor         PAST MEDICAL HISTORY:  Past Medical History:   Diagnosis Date     ADHD (attention deficit hyperactivity disorder)      Anxiety      Bipolar 1 disorder (H)      Headache      Hypertension      IUD contraception 01/2019    post ab         PAST SURGICAL HISTORY:  Past Surgical History:   Procedure Laterality Date     AS INDUCED ABORTN BY D&C  01/2019    16 weeks. PP         SOCIAL HISTORY:  Social History     Socioeconomic History     Marital status: Single     Spouse name: Not on file     Number of children:  Not on file     Years of education: Not on file     Highest education level: Not on file   Occupational History     Not on file   Social Needs     Financial resource strain: Not on file     Food insecurity     Worry: Not on file     Inability: Not on file     Transportation needs     Medical: Not on file     Non-medical: Not on file   Tobacco Use     Smoking status: Current Every Day Smoker     Types: Cigarettes     Smokeless tobacco: Never Used     Tobacco comment: 5 cigs per day   Substance and Sexual Activity     Alcohol use: Not Currently     Alcohol/week: 0.0 standard drinks     Comment: once a month     Drug use: Yes     Frequency: 7.0 times per week     Types: Marijuana     Comment: daily     Sexual activity: Yes     Partners: Female, Male     Birth control/protection: I.U.D.     [facility]   Lifestyle     Physical activity     Days per week: Not on file     Minutes per session: Not on file     Stress: Not on file   Relationships     Social connections     Talks on phone: Not on file     Gets together: Not on file     Attends Mandaeism service: Not on file     Active member of club or organization: Not on file     Attends meetings of clubs or organizations: Not on file     Relationship status: Not on file     Intimate partner violence     Fear of current or ex partner: Not on file     Emotionally abused: Not on file     Physically abused: Not on file     Forced sexual activity: Not on file   Other Topics Concern     Parent/sibling w/ CABG, MI or angioplasty before 65F 55M? Not Asked   Social History Narrative     Not on file         FAMILY HISTORY:  Family History   Problem Relation Age of Onset     Diabetes Mother      Hypertension Mother      Skin Cancer Mother      Diabetes Father          PHYSICAL EXAM:  Vital signs:  There were no vitals taken for this visit.   ECO  GENERAL/CONSTITUTIONAL: No acute distress. Healthy, alert.  EYES: No scleral icterus.  No redness or discharge.     RESPIRATORY: No audible wheeze, cough, or visible cyanosis.  No visible retractions or increased work of breathing.  Able to speak fully in complete sentences.  MUSCULOSKELETAL: Normal range of motion.  NEUROLOGIC: Alert, oriented, answers questions appropriately. No tremor. Mentation intact and speech normal  INTEGUMENTARY: No jaundice.  No obvious rash or skin lesions.  PSYCHIATRIC:  Mentation appears normal, affect normal/bright, judgement and insight intact, normal speech and appearance well-groomed.    The rest of a comprehensive physical exam is deferred due to public health emergency video visit restrictions.        LABS:  CBC RESULTS:   Recent Labs   Lab Test 06/11/20  1324   WBC 8.9   RBC 5.14   HGB 15.9*   HCT 47.2*   MCV 92   MCH 30.9   MCHC 33.7   RDW 12.3          Recent Labs   Lab Test 06/11/20  1324 02/08/20  2053    140   POTASSIUM 3.8 3.9   CHLORIDE 109 110*   CO2 23 26   ANIONGAP 9 4   GLC 93 100*   BUN 10 11   CR 0.68 0.67   GUME 8.8 9.1         ASSESSMENT/PLAN:  Kailey Argueta is a 20 year old adult with:    Elevated hemoglobin: We discussed possible causes, including smoking.  I advised complete smoking cessation.  We will check labs, including iron, ferritin, erythropoietin level, and myeloproliferative panel to rule out a primary myeloproliferative disorder, but suspect that this mild hemoglobin increase is due to smoking.  Of note, CT abdomen/pelvis on 6/11/20 showed no hepatosplenomegaly.  -labs, including CBC, CMP, peripheral smear, iron, ferritin, erythropoietin, myeloproliferative panel  -RTC ~2 weeks after labs are done to review results                Jenny Rodriguez MD  Hematology/Oncology  [facility]      Again, thank you for allowing me to participate in the care of your patient.        Sincerely,        Jenny Rodriguez MD

## 2020-09-15 NOTE — LETTER
"    9/15/2020         RE: Kailey Argueta  [address]  [place] [address]        Dear Colleague,    Thank you for referring your patient, Kailey Argueta, to the [facility]. Please see a copy of my visit note below.    Kailey Argueta is a 20 year old adult who is being evaluated via a billable video visit.      The patient has been notified of following:     \"This video visit will be conducted via a call between you and your physician/provider. We have found that certain health care needs can be provided without the need for an in-person physical exam.  This service lets us provide the care you need with a video conversation.  If a prescription is necessary we can send it directly to your pharmacy.  If lab work is needed we can place an order for that and you can then stop by our lab to have the test done at a later time.    Video visits are billed at different rates depending on your insurance coverage.  Please reach out to your insurance provider with any questions.    If during the course of the call the physician/provider feels a video visit is not appropriate, you will not be charged for this service.\"    Patient has given verbal consent for Video visit? Yes  How would you like to obtain your AVS? MyChart  If you are dropped from the video visit, the video invite should be resent to: Text to cell phone: [employer] TEXT INVITE [phone]      [employer] TEXT INVITE [phone]     Will anyone else be joining your video visit? No       Rhina Canas CMA      Video-Visit Details    Type of service:  Video Visit    Video Start Time: 8:15 am  Video End Time: 8:39 AM    Originating Location (pt. Location): Home    Distant Location (provider location):  [facility]     Platform used for Video Visit: [employer]      [facility]    Hematology/Oncology New Patient Note      Today's Date: 09/15/20    Reason for Consult: Elevated hemoglobin      HISTORY OF PRESENT ILLNESS: Kailey" AGUILA Argueta is a 20 year old adult with PMHx of HTN, bipolar 1 disorder, anxiety, ADHD, who presents with elevated hemoglobin.   Annalisa says that she feels tired.  Annalisa does smoke about 3-4 cigarettes a day for the past couple of years.  Annalisa denies having sleep apnea, but notes having family history of it.    On chart review, labs show a mildly elevated hemoglobin in the 15-16 range since September 2019.  In May 2019, CBC was completely normal.          REVIEW OF SYSTEMS:   14 point ROS was reviewed and is negative other than as noted above in HPI.       HOME MEDICATIONS:  Current Outpatient Medications   Medication Sig Dispense Refill     albuterol (PROAIR HFA/PROVENTIL HFA/VENTOLIN HFA) 108 (90 Base) MCG/ACT inhaler Inhale 2 puffs into the lungs every 4 hours as needed for shortness of breath / dyspnea or wheezing 1 Inhaler 1     docusate sodium (COLACE) 100 MG capsule Take 100 mg by mouth       gabapentin (NEURONTIN) 300 MG capsule Take 300 mg by mouth       hydrOXYzine (VISTARIL) 50 MG capsule Take 50 mg by mouth       MELATONIN MAXIMUM STRENGTH 5 MG tablet        omeprazole (PRILOSEC) 20 MG DR capsule Take 1 capsule (20 mg) by mouth daily 90 capsule 1     VENTOLIN  (90 Base) MCG/ACT inhaler TAKE 2 PUFFS BY MOUTH EVERY 4 HOURS AS NEEDED           ALLERGIES:  Allergies   Allergen Reactions     Kiwi Other (See Comments)     Mild throat swelling per pt; able to have artificially sweetened kiwi flavor         PAST MEDICAL HISTORY:  Past Medical History:   Diagnosis Date     ADHD (attention deficit hyperactivity disorder)      Anxiety      Bipolar 1 disorder (H)      Headache      Hypertension      IUD contraception 01/2019    post ab         PAST SURGICAL HISTORY:  Past Surgical History:   Procedure Laterality Date     AS INDUCED ABORTN BY D&C  01/2019    16 weeks. PP         SOCIAL HISTORY:  Social History     Socioeconomic History     Marital status: Single     Spouse name: Not on file     Number of children:  Not on file     Years of education: Not on file     Highest education level: Not on file   Occupational History     Not on file   Social Needs     Financial resource strain: Not on file     Food insecurity     Worry: Not on file     Inability: Not on file     Transportation needs     Medical: Not on file     Non-medical: Not on file   Tobacco Use     Smoking status: Current Every Day Smoker     Types: Cigarettes     Smokeless tobacco: Never Used     Tobacco comment: 5 cigs per day   Substance and Sexual Activity     Alcohol use: Not Currently     Alcohol/week: 0.0 standard drinks     Comment: once a month     Drug use: Yes     Frequency: 7.0 times per week     Types: Marijuana     Comment: daily     Sexual activity: Yes     Partners: Female, Male     Birth control/protection: I.U.D.     [facility]   Lifestyle     Physical activity     Days per week: Not on file     Minutes per session: Not on file     Stress: Not on file   Relationships     Social connections     Talks on phone: Not on file     Gets together: Not on file     Attends Holiness service: Not on file     Active member of club or organization: Not on file     Attends meetings of clubs or organizations: Not on file     Relationship status: Not on file     Intimate partner violence     Fear of current or ex partner: Not on file     Emotionally abused: Not on file     Physically abused: Not on file     Forced sexual activity: Not on file   Other Topics Concern     Parent/sibling w/ CABG, MI or angioplasty before 65F 55M? Not Asked   Social History Narrative     Not on file         FAMILY HISTORY:  Family History   Problem Relation Age of Onset     Diabetes Mother      Hypertension Mother      Skin Cancer Mother      Diabetes Father          PHYSICAL EXAM:  Vital signs:  There were no vitals taken for this visit.   ECO  GENERAL/CONSTITUTIONAL: No acute distress. Healthy, alert.  EYES: No scleral icterus.  No redness or discharge.     RESPIRATORY: No audible wheeze, cough, or visible cyanosis.  No visible retractions or increased work of breathing.  Able to speak fully in complete sentences.  MUSCULOSKELETAL: Normal range of motion.  NEUROLOGIC: Alert, oriented, answers questions appropriately. No tremor. Mentation intact and speech normal  INTEGUMENTARY: No jaundice.  No obvious rash or skin lesions.  PSYCHIATRIC:  Mentation appears normal, affect normal/bright, judgement and insight intact, normal speech and appearance well-groomed.    The rest of a comprehensive physical exam is deferred due to public health emergency video visit restrictions.        LABS:  CBC RESULTS:   Recent Labs   Lab Test 06/11/20  1324   WBC 8.9   RBC 5.14   HGB 15.9*   HCT 47.2*   MCV 92   MCH 30.9   MCHC 33.7   RDW 12.3          Recent Labs   Lab Test 06/11/20  1324 02/08/20  2053    140   POTASSIUM 3.8 3.9   CHLORIDE 109 110*   CO2 23 26   ANIONGAP 9 4   GLC 93 100*   BUN 10 11   CR 0.68 0.67   GUME 8.8 9.1         ASSESSMENT/PLAN:  Kailey Argueta is a 20 year old adult with:    Elevated hemoglobin: We discussed possible causes, including smoking.  I advised complete smoking cessation.  We will check labs, including iron, ferritin, erythropoietin level, and myeloproliferative panel to rule out a primary myeloproliferative disorder, but suspect that this mild hemoglobin increase is due to smoking.  Of note, CT abdomen/pelvis on 6/11/20 showed no hepatosplenomegaly.  -labs, including CBC, CMP, peripheral smear, iron, ferritin, erythropoietin, myeloproliferative panel  -RTC ~2 weeks after labs are done to review results                Jenny Rodriguez MD  Hematology/Oncology  [facility]      Again, thank you for allowing me to participate in the care of your patient.        Sincerely,        Jenny Rodriguez MD

## 2020-09-17 NOTE — PATIENT INSTRUCTIONS
Lab appt scheduled 9/22  Appt with Dr. Rodriguez scheduled 10/7  Josselyn Cuellar RN on 9/17/2020 at 8:25 AM

## 2020-09-22 ENCOUNTER — HOSPITAL ENCOUNTER (OUTPATIENT)
Facility: CLINIC | Age: 20
Setting detail: SPECIMEN
Discharge: HOME | End: 2020-09-22
Attending: INTERNAL MEDICINE | Admitting: INTERNAL MEDICINE
Payer: COMMERCIAL

## 2020-09-22 ENCOUNTER — HOSPITAL ENCOUNTER (OUTPATIENT)
Facility: CLINIC | Age: 20
Setting detail: SPECIMEN
End: 2020-09-22
Attending: INTERNAL MEDICINE
Payer: COMMERCIAL

## 2020-09-22 DIAGNOSIS — D58.2 ELEVATED HEMOGLOBIN (H): ICD-10-CM

## 2020-09-22 LAB
ALBUMIN SERPL-MCNC: 3.9 G/DL (ref 3.4–5)
ALP SERPL-CCNC: 77 U/L (ref 40–150)
ALT SERPL W P-5'-P-CCNC: 24 U/L (ref 0–50)
ANION GAP SERPL CALCULATED.3IONS-SCNC: 10 MMOL/L (ref 3–14)
AST SERPL W P-5'-P-CCNC: 13 U/L (ref 0–45)
BASOPHILS # BLD AUTO: 0.1 10E9/L (ref 0–0.2)
BASOPHILS NFR BLD AUTO: 0.6 %
BILIRUB SERPL-MCNC: 0.8 MG/DL (ref 0.2–1.3)
BUN SERPL-MCNC: 7 MG/DL (ref 7–30)
CALCIUM SERPL-MCNC: 8.8 MG/DL (ref 8.5–10.1)
CHLORIDE SERPL-SCNC: 111 MMOL/L (ref 94–109)
CO2 SERPL-SCNC: 20 MMOL/L (ref 20–32)
COPATH REPORT: NORMAL
CREAT SERPL-MCNC: 0.68 MG/DL (ref 0.52–1.04)
DIFFERENTIAL METHOD BLD: ABNORMAL
EOSINOPHIL # BLD AUTO: 0.7 10E9/L (ref 0–0.7)
EOSINOPHIL NFR BLD AUTO: 7.6 %
ERYTHROCYTE [DISTWIDTH] IN BLOOD BY AUTOMATED COUNT: 11.9 % (ref 10–15)
FERRITIN SERPL-MCNC: 40 NG/ML (ref 12–150)
GFR SERPL CREATININE-BSD FRML MDRD: >90 ML/MIN/{1.73_M2}
GLUCOSE SERPL-MCNC: 114 MG/DL (ref 70–99)
HCT VFR BLD AUTO: 47.6 % (ref 35–47)
HGB BLD-MCNC: 15.4 G/DL (ref 11.7–15.7)
IMM GRANULOCYTES # BLD: 0 10E9/L (ref 0–0.4)
IMM GRANULOCYTES NFR BLD: 0.2 %
IRON SATN MFR SERPL: 19 % (ref 15–46)
IRON SERPL-MCNC: 67 UG/DL (ref 35–180)
LYMPHOCYTES # BLD AUTO: 3.7 10E9/L (ref 0.8–5.3)
LYMPHOCYTES NFR BLD AUTO: 42.4 %
MCH RBC QN AUTO: 30.1 PG (ref 26.5–33)
MCHC RBC AUTO-ENTMCNC: 32.4 G/DL (ref 31.5–36.5)
MCV RBC AUTO: 93 FL (ref 78–100)
MONOCYTES # BLD AUTO: 0.3 10E9/L (ref 0–1.3)
MONOCYTES NFR BLD AUTO: 3.5 %
NEUTROPHILS # BLD AUTO: 4 10E9/L (ref 1.6–8.3)
NEUTROPHILS NFR BLD AUTO: 45.7 %
NRBC # BLD AUTO: 0 10*3/UL
NRBC BLD AUTO-RTO: 0 /100
PLATELET # BLD AUTO: 256 10E9/L (ref 150–450)
POTASSIUM SERPL-SCNC: 3.7 MMOL/L (ref 3.4–5.3)
PROT SERPL-MCNC: 7.3 G/DL (ref 6.8–8.8)
RBC # BLD AUTO: 5.12 10E12/L (ref 3.8–5.2)
RETICS # AUTO: 75.3 10E9/L (ref 25–95)
RETICS/RBC NFR AUTO: 1.5 % (ref 0.5–2)
SODIUM SERPL-SCNC: 141 MMOL/L (ref 133–144)
TIBC SERPL-MCNC: 354 UG/DL (ref 240–430)
WBC # BLD AUTO: 8.7 10E9/L (ref 4–11)

## 2020-09-22 PROCEDURE — 85025 COMPLETE CBC W/AUTO DIFF WBC: CPT | Performed by: INTERNAL MEDICINE

## 2020-09-22 PROCEDURE — 85060 BLOOD SMEAR INTERPRETATION: CPT | Performed by: INTERNAL MEDICINE

## 2020-09-22 PROCEDURE — 81219 CALR GENE COM VARIANTS: CPT | Performed by: INTERNAL MEDICINE

## 2020-09-22 PROCEDURE — 82728 ASSAY OF FERRITIN: CPT | Performed by: INTERNAL MEDICINE

## 2020-09-22 PROCEDURE — 81270 JAK2 GENE: CPT | Performed by: INTERNAL MEDICINE

## 2020-09-22 PROCEDURE — 83540 ASSAY OF IRON: CPT | Performed by: INTERNAL MEDICINE

## 2020-09-22 PROCEDURE — 82668 ASSAY OF ERYTHROPOIETIN: CPT | Performed by: INTERNAL MEDICINE

## 2020-09-22 PROCEDURE — 81403 MOPATH PROCEDURE LEVEL 4: CPT | Performed by: INTERNAL MEDICINE

## 2020-09-22 PROCEDURE — 80053 COMPREHEN METABOLIC PANEL: CPT | Performed by: INTERNAL MEDICINE

## 2020-09-22 PROCEDURE — 40000847 ZZHCL STATISTIC MORPHOLOGY W/INTERP HISTOLOGY TC 85060: Performed by: INTERNAL MEDICINE

## 2020-09-22 PROCEDURE — 85045 AUTOMATED RETICULOCYTE COUNT: CPT | Performed by: INTERNAL MEDICINE

## 2020-09-22 PROCEDURE — 83550 IRON BINDING TEST: CPT | Performed by: INTERNAL MEDICINE

## 2020-09-22 PROCEDURE — 36415 COLL VENOUS BLD VENIPUNCTURE: CPT

## 2020-09-22 NOTE — PROGRESS NOTES
Medical Assistant Note:  Kailey Argueta presents today for blood draw.    Patient seen by provider today: No.   present during visit today: Not Applicable.    Concerns: No Concerns.    Procedure:  Labs drawn    Post Assessment:  Labs drawn without difficulty: Yes.    Discharge Plan:  Departure Mode: Ambulatory.    Face to Face Time: 10 min.    Monika Browning CMA

## 2020-09-23 LAB — EPO SERPL-ACNC: 13 MU/ML (ref 4–27)

## 2020-09-29 LAB — COPATH REPORT: NORMAL

## 2020-10-07 ENCOUNTER — VIRTUAL VISIT (OUTPATIENT)
Dept: ONCOLOGY | Facility: CLINIC | Age: 20
End: 2020-10-07
Attending: INTERNAL MEDICINE
Payer: COMMERCIAL

## 2020-10-07 DIAGNOSIS — D58.2 ELEVATED HEMOGLOBIN (H): Primary | ICD-10-CM

## 2020-10-07 PROCEDURE — 99213 OFFICE O/P EST LOW 20 MIN: CPT | Mod: 95 | Performed by: INTERNAL MEDICINE

## 2020-10-07 PROCEDURE — 999N001193 HC VIDEO/TELEPHONE VISIT; NO CHARGE

## 2020-10-07 ASSESSMENT — PAIN SCALES - GENERAL: PAINLEVEL: SEVERE PAIN (6)

## 2020-10-07 NOTE — PROGRESS NOTES
"Kailey Argueta is a 20 year old adult who is being evaluated via a billable video visit.      The patient has been notified of following:     \"This video visit will be conducted via a call between you and your physician/provider. We have found that certain health care needs can be provided without the need for an in-person physical exam.  This service lets us provide the care you need with a video conversation.  If a prescription is necessary we can send it directly to your pharmacy.  If lab work is needed we can place an order for that and you can then stop by our lab to have the test done at a later time.    Video visits are billed at different rates depending on your insurance coverage.  Please reach out to your insurance provider with any questions.    If during the course of the call the physician/provider feels a video visit is not appropriate, you will not be charged for this service.\"    Patient has given verbal consent for Video visit? Yes  How would you like to obtain your AVS? MyChart  If you are dropped from the video visit, the video invite should be resent to: Text to cell phone: [phone]   Will anyone else be joining your video visit? No      Video-Visit Details    Type of service:  Video Visit    Video start time: 3:46 pm    Video end time: 3:52 pm    Originating Location (pt. Location): Home    Distant Location (provider location):  [facility]     Platform used for Video Visit: [employer]  - Pt will be in [employer]    Shari J. Schoenberger, CMA.      [facility]    Hematology/Oncology Established Patient Note      Today's Date: 10/07/20    Reason for Follow-up: Elevated hemoglobin      HISTORY OF PRESENT ILLNESS: Kailey Argueta is a 20 year old adult with PMHx of HTN, bipolar 1 disorder, anxiety, ADHD, who presents with elevated hemoglobin.   Annalisa says that she feels tired.  Annalisa does smoke about 3-4 cigarettes a day for the past couple of years.  " Annalisa denies having sleep apnea, but notes having family history of it.    On chart review, labs show a mildly elevated hemoglobin in the 15-16 range since September 2019.  In May 2019, CBC was completely normal.        INTERIM HISTORY: Annalisa says that she is feeling a bit better.          REVIEW OF SYSTEMS:   14 point ROS was reviewed and is negative other than as noted above in HPI.       HOME MEDICATIONS:  Current Outpatient Medications   Medication Sig Dispense Refill     albuterol (PROAIR HFA/PROVENTIL HFA/VENTOLIN HFA) 108 (90 Base) MCG/ACT inhaler Inhale 2 puffs into the lungs every 4 hours as needed for shortness of breath / dyspnea or wheezing 1 Inhaler 1     docusate sodium (COLACE) 100 MG capsule Take 100 mg by mouth       gabapentin (NEURONTIN) 300 MG capsule Take 300 mg by mouth       hydrOXYzine (VISTARIL) 50 MG capsule Take 50 mg by mouth       MELATONIN MAXIMUM STRENGTH 5 MG tablet        omeprazole (PRILOSEC) 20 MG DR capsule Take 1 capsule (20 mg) by mouth daily 90 capsule 1     VENTOLIN  (90 Base) MCG/ACT inhaler TAKE 2 PUFFS BY MOUTH EVERY 4 HOURS AS NEEDED           ALLERGIES:  Allergies   Allergen Reactions     Kiwi Other (See Comments)     Mild throat swelling per pt; able to have artificially sweetened kiwi flavor         PAST MEDICAL HISTORY:  Past Medical History:   Diagnosis Date     ADHD (attention deficit hyperactivity disorder)      Anxiety      Bipolar 1 disorder (H)      Headache      Hypertension      IUD contraception 01/2019    post ab         PAST SURGICAL HISTORY:  Past Surgical History:   Procedure Laterality Date     AS INDUCED ABORTN BY D&C  01/2019    16 weeks. PP         SOCIAL HISTORY:  Social History     Socioeconomic History     Marital status: Single     Spouse name: Not on file     Number of children: Not on file     Years of education: Not on file     Highest education level: Not on file   Occupational History     Not on file   Social Needs     Financial resource  strain: Not on file     Food insecurity     Worry: Not on file     Inability: Not on file     Transportation needs     Medical: Not on file     Non-medical: Not on file   Tobacco Use     Smoking status: Current Every Day Smoker     Types: Cigarettes     Smokeless tobacco: Never Used     Tobacco comment: 5 cigs per day   Substance and Sexual Activity     Alcohol use: Not Currently     Alcohol/week: 0.0 standard drinks     Comment: once a month     Drug use: Yes     Frequency: 7.0 times per week     Types: Marijuana     Comment: daily     Sexual activity: Yes     Partners: Female, Male     Birth control/protection: I.U.D.     Comment: Liza hardy 2019   Lifestyle     Physical activity     Days per week: Not on file     Minutes per session: Not on file     Stress: Not on file   Relationships     Social connections     Talks on phone: Not on file     Gets together: Not on file     Attends Jehovah's witness service: Not on file     Active member of club or organization: Not on file     Attends meetings of clubs or organizations: Not on file     Relationship status: Not on file     Intimate partner violence     Fear of current or ex partner: Not on file     Emotionally abused: Not on file     Physically abused: Not on file     Forced sexual activity: Not on file   Other Topics Concern     Parent/sibling w/ CABG, MI or angioplasty before 65F 55M? Not Asked   Social History Narrative     Not on file         FAMILY HISTORY:  Family History   Problem Relation Age of Onset     Diabetes Mother      Hypertension Mother      Skin Cancer Mother      Diabetes Father          PHYSICAL EXAM:  ECO  GENERAL/CONSTITUTIONAL: No acute distress. Healthy, alert.  EYES: No scleral icterus.  No redness or discharge.    RESPIRATORY: No audible wheeze, cough, or visible cyanosis.  No visible retractions or increased work of breathing.  Able to speak fully in complete sentences.  MUSCULOSKELETAL: Normal range of motion.  NEUROLOGIC: Alert,  oriented, answers questions appropriately. No tremor. Mentation intact and speech normal  INTEGUMENTARY: No jaundice.  No obvious rash or skin lesions.  PSYCHIATRIC:  Mentation appears normal, affect normal/bright, judgement and insight intact, normal speech and appearance well-groomed.    The rest of a comprehensive physical exam is deferred due to public [facility] emergency video visit restrictions.        LABS:  CBC RESULTS:   Recent Labs   Lab Test 09/22/20  1306   WBC 8.7   RBC 5.12   HGB 15.4   HCT 47.6*   MCV 93   MCH 30.1   MCHC 32.4   RDW 11.9        Recent Labs   Lab Test 09/22/20  1306 06/11/20  1324    141   POTASSIUM 3.7 3.8   CHLORIDE 111* 109   CO2 20 23   ANIONGAP 10 9   * 93   BUN 7 10   CR 0.68 0.68   GUME 8.8 8.8     Lab Results   Component Value Date    AST 13 09/22/2020     Lab Results   Component Value Date    ALT 24 09/22/2020     No results found for: BILICONJ   Lab Results   Component Value Date    BILITOTAL 0.8 09/22/2020     Lab Results   Component Value Date    ALBUMIN 3.9 09/22/2020     Lab Results   Component Value Date    PROTTOTAL 7.3 09/22/2020      Lab Results   Component Value Date    ALKPHOS 77 09/22/2020       Component      Latest Ref Rng & Units 9/22/2020   Iron      35 - 180 ug/dL 67   Iron Binding Cap      240 - 430 ug/dL 354   Iron Saturation Index      15 - 46 % 19   % Retic      0.5 - 2.0 % 1.5   Absolute Retic      25 - 95 10e9/L 75.3   Erythropoietin      4 - 27 mU/mL 13   Ferritin      12 - 150 ng/mL 40       Peripheral smear 9/22/20:  FINAL DIAGNOSIS:   Peripheral blood.   - Hematocrit slightly elevated.  Morphologically nonspecific.     COMMENT:   Current hemoglobin is within normal limits.  The hematocrit is slightly   elevated.  Potential causes of the   slight elevated hematocrit include but are not limited too, various   cardiopulmonary diseases, smoking, sleep   apnea, elevated erythropoietin, spurious, dehydration, and less likely   early chronic  myeloproliferative   neoplasms etc.  Clinical correlation is required.       ASSESSMENT/PLAN:  Kailey Argueta is a 20 year old adult with:    Elevated hemoglobin: We discussed possible causes, including smoking.  I advised complete smoking cessation.  Lab evaluation was essentially unremarkable, including negative myeloproliferative panel.  Of note, CT abdomen/pelvis on 6/11/20 showed no hepatosplenomegaly.  Her CBC this time actually shows a normal hemoglobin.    Occasional mildly elevated hemoglobin is likely due to smoking.  I again advised smoking cessation.    She says that she doesn't have a consistent PCP consistently, so would like to follow-up with CBC here next time.  -RTC in 6 months with CBC        Jenny Rodriguez MD  Hematology/Oncology  [facility]

## 2020-10-07 NOTE — LETTER
"    10/7/2020         RE: Kailey Argueta  [address]  [place] [place] [address]        Dear Colleague,    Thank you for referring your patient, Kailey Argueta, to the [facility]. Please see a copy of my visit note below.    Kailey Argueta is a 20 year old adult who is being evaluated via a billable video visit.      The patient has been notified of following:     \"This video visit will be conducted via a call between you and your physician/provider. We have found that certain health care needs can be provided without the need for an in-person physical exam.  This service lets us provide the care you need with a video conversation.  If a prescription is necessary we can send it directly to your pharmacy.  If lab work is needed we can place an order for that and you can then stop by our lab to have the test done at a later time.    Video visits are billed at different rates depending on your insurance coverage.  Please reach out to your insurance provider with any questions.    If during the course of the call the physician/provider feels a video visit is not appropriate, you will not be charged for this service.\"    Patient has given verbal consent for Video visit? Yes  How would you like to obtain your AVS? MyChart  If you are dropped from the video visit, the video invite should be resent to: Text to cell phone: [phone]   Will anyone else be joining your video visit? No      Video-Visit Details    Type of service:  Video Visit    Video start time: 3:46 pm    Video end time: 3:52 pm    Originating Location (pt. Location): Home    Distant Location (provider location):  [facility]     Platform used for Video Visit: [employer]  - Pt will be in [employer]    Shari J. Schoenberger, CMA.      [facility]    Hematology/Oncology Established Patient Note      Today's Date: 10/07/20    Reason for Follow-up: Elevated hemoglobin      HISTORY " OF PRESENT ILLNESS: Kailey Argueta is a 20 year old adult with PMHx of HTN, bipolar 1 disorder, anxiety, ADHD, who presents with elevated hemoglobin.   Annalisa says that she feels tired.  Annalisa does smoke about 3-4 cigarettes a day for the past couple of years.  Annalisa denies having sleep apnea, but notes having family history of it.    On chart review, labs show a mildly elevated hemoglobin in the 15-16 range since September 2019.  In May 2019, CBC was completely normal.        INTERIM HISTORY: Annalisa says that she is feeling a bit better.          REVIEW OF SYSTEMS:   14 point ROS was reviewed and is negative other than as noted above in HPI.       HOME MEDICATIONS:  Current Outpatient Medications   Medication Sig Dispense Refill     albuterol (PROAIR HFA/PROVENTIL HFA/VENTOLIN HFA) 108 (90 Base) MCG/ACT inhaler Inhale 2 puffs into the lungs every 4 hours as needed for shortness of breath / dyspnea or wheezing 1 Inhaler 1     docusate sodium (COLACE) 100 MG capsule Take 100 mg by mouth       gabapentin (NEURONTIN) 300 MG capsule Take 300 mg by mouth       hydrOXYzine (VISTARIL) 50 MG capsule Take 50 mg by mouth       MELATONIN MAXIMUM STRENGTH 5 MG tablet        omeprazole (PRILOSEC) 20 MG DR capsule Take 1 capsule (20 mg) by mouth daily 90 capsule 1     VENTOLIN  (90 Base) MCG/ACT inhaler TAKE 2 PUFFS BY MOUTH EVERY 4 HOURS AS NEEDED           ALLERGIES:  Allergies   Allergen Reactions     Kiwi Other (See Comments)     Mild throat swelling per pt; able to have artificially sweetened kiwi flavor         PAST MEDICAL HISTORY:  Past Medical History:   Diagnosis Date     ADHD (attention deficit hyperactivity disorder)      Anxiety      Bipolar 1 disorder (H)      Headache      Hypertension      IUD contraception 01/2019    post ab         PAST SURGICAL HISTORY:  Past Surgical History:   Procedure Laterality Date     AS INDUCED ABORTN BY D&C  01/2019    16 weeks. PP         SOCIAL HISTORY:  Social History      Socioeconomic History     Marital status: Single     Spouse name: Not on file     Number of children: Not on file     Years of education: Not on file     Highest education level: Not on file   Occupational History     Not on file   Social Needs     Financial resource strain: Not on file     Food insecurity     Worry: Not on file     Inability: Not on file     Transportation needs     Medical: Not on file     Non-medical: Not on file   Tobacco Use     Smoking status: Current Every Day Smoker     Types: Cigarettes     Smokeless tobacco: Never Used     Tobacco comment: 5 cigs per day   Substance and Sexual Activity     Alcohol use: Not Currently     Alcohol/week: 0.0 standard drinks     Comment: once a month     Drug use: Yes     Frequency: 7.0 times per week     Types: Marijuana     Comment: daily     Sexual activity: Yes     Partners: Female, Male     Birth control/protection: I.U.D.     Comment: Liza hardy 2019   Lifestyle     Physical activity     Days per week: Not on file     Minutes per session: Not on file     Stress: Not on file   Relationships     Social connections     Talks on phone: Not on file     Gets together: Not on file     Attends Shinto service: Not on file     Active member of club or organization: Not on file     Attends meetings of clubs or organizations: Not on file     Relationship status: Not on file     Intimate partner violence     Fear of current or ex partner: Not on file     Emotionally abused: Not on file     Physically abused: Not on file     Forced sexual activity: Not on file   Other Topics Concern     Parent/sibling w/ CABG, MI or angioplasty before 65F 55M? Not Asked   Social History Narrative     Not on file         FAMILY HISTORY:  Family History   Problem Relation Age of Onset     Diabetes Mother      Hypertension Mother      Skin Cancer Mother      Diabetes Father          PHYSICAL EXAM:  ECO  GENERAL/CONSTITUTIONAL: No acute distress. Healthy, alert.  EYES: No  scleral icterus.  No redness or discharge.    RESPIRATORY: No audible wheeze, cough, or visible cyanosis.  No visible retractions or increased work of breathing.  Able to speak fully in complete sentences.  MUSCULOSKELETAL: Normal range of motion.  NEUROLOGIC: Alert, oriented, answers questions appropriately. No tremor. Mentation intact and speech normal  INTEGUMENTARY: No jaundice.  No obvious rash or skin lesions.  PSYCHIATRIC:  Mentation appears normal, affect normal/bright, judgement and insight intact, normal speech and appearance well-groomed.    The rest of a comprehensive physical exam is deferred due to public health emergency video visit restrictions.        LABS:  CBC RESULTS:   Recent Labs   Lab Test 09/22/20  1306   WBC 8.7   RBC 5.12   HGB 15.4   HCT 47.6*   MCV 93   MCH 30.1   MCHC 32.4   RDW 11.9        Recent Labs   Lab Test 09/22/20  1306 06/11/20  1324    141   POTASSIUM 3.7 3.8   CHLORIDE 111* 109   CO2 20 23   ANIONGAP 10 9   * 93   BUN 7 10   CR 0.68 0.68   GUME 8.8 8.8     Lab Results   Component Value Date    AST 13 09/22/2020     Lab Results   Component Value Date    ALT 24 09/22/2020     No results found for: BILICONJ   Lab Results   Component Value Date    BILITOTAL 0.8 09/22/2020     Lab Results   Component Value Date    ALBUMIN 3.9 09/22/2020     Lab Results   Component Value Date    PROTTOTAL 7.3 09/22/2020      Lab Results   Component Value Date    ALKPHOS 77 09/22/2020       Component      Latest Ref Rng & Units 9/22/2020   Iron      35 - 180 ug/dL 67   Iron Binding Cap      240 - 430 ug/dL 354   Iron Saturation Index      15 - 46 % 19   % Retic      0.5 - 2.0 % 1.5   Absolute Retic      25 - 95 10e9/L 75.3   Erythropoietin      4 - 27 mU/mL 13   Ferritin      12 - 150 ng/mL 40       Peripheral smear 9/22/20:  FINAL DIAGNOSIS:   Peripheral blood.   - Hematocrit slightly elevated.  Morphologically nonspecific.     COMMENT:   Current hemoglobin is within normal  limits.  The hematocrit is slightly   elevated.  Potential causes of the   slight elevated hematocrit include but are not limited too, various   cardiopulmonary diseases, smoking, sleep   apnea, elevated erythropoietin, spurious, dehydration, and less likely   early chronic myeloproliferative   neoplasms etc.  Clinical correlation is required.       ASSESSMENT/PLAN:  Kailey Argueta is a 20 year old adult with:    Elevated hemoglobin: We discussed possible causes, including smoking.  I advised complete smoking cessation.  Lab evaluation was essentially unremarkable, including negative myeloproliferative panel.  Of note, CT abdomen/pelvis on 6/11/20 showed no hepatosplenomegaly.  Her CBC this time actually shows a normal hemoglobin.    Occasional mildly elevated hemoglobin is likely due to smoking.  I again advised smoking cessation.    She says that she doesn't have a consistent PCP consistently, so would like to follow-up with CBC here next time.  -RTC in 6 months with CBC        Jenny Rodriguez MD  Hematology/Oncology  [facility]            Again, thank you for allowing me to participate in the care of your patient.        Sincerely,        Jenny Rodriguez MD

## 2020-10-07 NOTE — LETTER
"    10/7/2020         RE: Kailey Argueta  [address]  [facility] [address]        Dear Colleague,    Thank you for referring your patient, Kailey Argueta, to the [facility]. Please see a copy of my visit note below.    Kailey Argueta is a 20 year old adult who is being evaluated via a billable video visit.      The patient has been notified of following:     \"This video visit will be conducted via a call between you and your physician/provider. We have found that certain health care needs can be provided without the need for an in-person physical exam.  This service lets us provide the care you need with a video conversation.  If a prescription is necessary we can send it directly to your pharmacy.  If lab work is needed we can place an order for that and you can then stop by our lab to have the test done at a later time.    Video visits are billed at different rates depending on your insurance coverage.  Please reach out to your insurance provider with any questions.    If during the course of the call the physician/provider feels a video visit is not appropriate, you will not be charged for this service.\"    Patient has given verbal consent for Video visit? Yes  How would you like to obtain your AVS? MyChart  If you are dropped from the video visit, the video invite should be resent to: Text to cell phone: [phone]   Will anyone else be joining your video visit? No      Video-Visit Details    Type of service:  Video Visit    Video start time: 3:46 pm    Video end time: 3:52 pm    Originating Location (pt. Location): Home    Distant Location (provider location):  [facility]     Platform used for Video Visit: AmYouFetch  - Pt will be in [employer]    Shari J. Schoenberger, CMA.      [facility]    Hematology/Oncology Established Patient Note      Today's Date: 10/07/20    Reason for Follow-up: Elevated hemoglobin      HISTORY " OF PRESENT ILLNESS: Kailey Argueta is a 20 year old adult with PMHx of HTN, bipolar 1 disorder, anxiety, ADHD, who presents with elevated hemoglobin.   Annalisa says that she feels tired.  Annalisa does smoke about 3-4 cigarettes a day for the past couple of years.  Annalisa denies having sleep apnea, but notes having family history of it.    On chart review, labs show a mildly elevated hemoglobin in the 15-16 range since September 2019.  In May 2019, CBC was completely normal.        INTERIM HISTORY: Annalisa says that she is feeling a bit better.          REVIEW OF SYSTEMS:   14 point ROS was reviewed and is negative other than as noted above in HPI.       HOME MEDICATIONS:  Current Outpatient Medications   Medication Sig Dispense Refill     albuterol (PROAIR HFA/PROVENTIL HFA/VENTOLIN HFA) 108 (90 Base) MCG/ACT inhaler Inhale 2 puffs into the lungs every 4 hours as needed for shortness of breath / dyspnea or wheezing 1 Inhaler 1     docusate sodium (COLACE) 100 MG capsule Take 100 mg by mouth       gabapentin (NEURONTIN) 300 MG capsule Take 300 mg by mouth       hydrOXYzine (VISTARIL) 50 MG capsule Take 50 mg by mouth       MELATONIN MAXIMUM STRENGTH 5 MG tablet        omeprazole (PRILOSEC) 20 MG DR capsule Take 1 capsule (20 mg) by mouth daily 90 capsule 1     VENTOLIN  (90 Base) MCG/ACT inhaler TAKE 2 PUFFS BY MOUTH EVERY 4 HOURS AS NEEDED           ALLERGIES:  Allergies   Allergen Reactions     Kiwi Other (See Comments)     Mild throat swelling per pt; able to have artificially sweetened kiwi flavor         PAST MEDICAL HISTORY:  Past Medical History:   Diagnosis Date     ADHD (attention deficit hyperactivity disorder)      Anxiety      Bipolar 1 disorder (H)      Headache      Hypertension      IUD contraception 01/2019    post ab         PAST SURGICAL HISTORY:  Past Surgical History:   Procedure Laterality Date     AS INDUCED ABORTN BY D&C  01/2019    16 weeks. PP         SOCIAL HISTORY:  Social History      Socioeconomic History     Marital status: Single     Spouse name: Not on file     Number of children: Not on file     Years of education: Not on file     Highest education level: Not on file   Occupational History     Not on file   Social Needs     Financial resource strain: Not on file     Food insecurity     Worry: Not on file     Inability: Not on file     Transportation needs     Medical: Not on file     Non-medical: Not on file   Tobacco Use     Smoking status: Current Every Day Smoker     Types: Cigarettes     Smokeless tobacco: Never Used     Tobacco comment: 5 cigs per day   Substance and Sexual Activity     Alcohol use: Not Currently     Alcohol/week: 0.0 standard drinks     Comment: once a month     Drug use: Yes     Frequency: 7.0 times per week     Types: Marijuana     Comment: daily     Sexual activity: Yes     Partners: Female, Male     Birth control/protection: I.U.D.     Comment: Liza hardy 2019   Lifestyle     Physical activity     Days per week: Not on file     Minutes per session: Not on file     Stress: Not on file   Relationships     Social connections     Talks on phone: Not on file     Gets together: Not on file     Attends Synagogue service: Not on file     Active member of club or organization: Not on file     Attends meetings of clubs or organizations: Not on file     Relationship status: Not on file     Intimate partner violence     Fear of current or ex partner: Not on file     Emotionally abused: Not on file     Physically abused: Not on file     Forced sexual activity: Not on file   Other Topics Concern     Parent/sibling w/ CABG, MI or angioplasty before 65F 55M? Not Asked   Social History Narrative     Not on file         FAMILY HISTORY:  Family History   Problem Relation Age of Onset     Diabetes Mother      Hypertension Mother      Skin Cancer Mother      Diabetes Father          PHYSICAL EXAM:  ECO  GENERAL/CONSTITUTIONAL: No acute distress. Healthy, alert.  EYES: No  scleral icterus.  No redness or discharge.    RESPIRATORY: No audible wheeze, cough, or visible cyanosis.  No visible retractions or increased work of breathing.  Able to speak fully in complete sentences.  MUSCULOSKELETAL: Normal range of motion.  NEUROLOGIC: Alert, oriented, answers questions appropriately. No tremor. Mentation intact and speech normal  INTEGUMENTARY: No jaundice.  No obvious rash or skin lesions.  PSYCHIATRIC:  Mentation appears normal, affect normal/bright, judgement and insight intact, normal speech and appearance well-groomed.    The rest of a comprehensive physical exam is deferred due to public health emergency video visit restrictions.        LABS:  CBC RESULTS:   Recent Labs   Lab Test 09/22/20  1306   WBC 8.7   RBC 5.12   HGB 15.4   HCT 47.6*   MCV 93   MCH 30.1   MCHC 32.4   RDW 11.9        Recent Labs   Lab Test 09/22/20  1306 06/11/20  1324    141   POTASSIUM 3.7 3.8   CHLORIDE 111* 109   CO2 20 23   ANIONGAP 10 9   * 93   BUN 7 10   CR 0.68 0.68   GUME 8.8 8.8     Lab Results   Component Value Date    AST 13 09/22/2020     Lab Results   Component Value Date    ALT 24 09/22/2020     No results found for: BILICONJ   Lab Results   Component Value Date    BILITOTAL 0.8 09/22/2020     Lab Results   Component Value Date    ALBUMIN 3.9 09/22/2020     Lab Results   Component Value Date    PROTTOTAL 7.3 09/22/2020      Lab Results   Component Value Date    ALKPHOS 77 09/22/2020       Component      Latest Ref Rng & Units 9/22/2020   Iron      35 - 180 ug/dL 67   Iron Binding Cap      240 - 430 ug/dL 354   Iron Saturation Index      15 - 46 % 19   % Retic      0.5 - 2.0 % 1.5   Absolute Retic      25 - 95 10e9/L 75.3   Erythropoietin      4 - 27 mU/mL 13   Ferritin      12 - 150 ng/mL 40       Peripheral smear 9/22/20:  FINAL DIAGNOSIS:   Peripheral blood.   - Hematocrit slightly elevated.  Morphologically nonspecific.     COMMENT:   Current hemoglobin is within normal  limits.  The hematocrit is slightly   elevated.  Potential causes of the   slight elevated hematocrit include but are not limited too, various   cardiopulmonary diseases, smoking, sleep   apnea, elevated erythropoietin, spurious, dehydration, and less likely   early chronic myeloproliferative   neoplasms etc.  Clinical correlation is required.       ASSESSMENT/PLAN:  Kailey Argueta is a 20 year old adult with:    Elevated hemoglobin: We discussed possible causes, including smoking.  I advised complete smoking cessation.  Lab evaluation was essentially unremarkable, including negative myeloproliferative panel.  Of note, CT abdomen/pelvis on 6/11/20 showed no hepatosplenomegaly.  Her CBC this time actually shows a normal hemoglobin.    Occasional mildly elevated hemoglobin is likely due to smoking.  I again advised smoking cessation.    She says that she doesn't have a consistent PCP consistently, so would like to follow-up with CBC here next time.  -RTC in 6 months with CBC        Jenny Rodriguez MD  Hematology/Oncology  [facility]            Again, thank you for allowing me to participate in the care of your patient.        Sincerely,        Jenny Rodriguez MD

## 2020-12-06 ENCOUNTER — HEALTH MAINTENANCE LETTER (OUTPATIENT)
Age: 20
End: 2020-12-06

## 2020-12-16 ENCOUNTER — OFFICE VISIT (OUTPATIENT)
Dept: FAMILY MEDICINE | Facility: CLINIC | Age: 20
End: 2020-12-16
Payer: COMMERCIAL

## 2020-12-16 VITALS
BODY MASS INDEX: 29.95 KG/M2 | HEIGHT: 67 IN | SYSTOLIC BLOOD PRESSURE: 140 MMHG | WEIGHT: 190.8 LBS | DIASTOLIC BLOOD PRESSURE: 80 MMHG | TEMPERATURE: 98.8 F | HEART RATE: 86 BPM | OXYGEN SATURATION: 96 %

## 2020-12-16 DIAGNOSIS — F12.10 CANNABIS ABUSE: ICD-10-CM

## 2020-12-16 DIAGNOSIS — F17.200 TOBACCO USE DISORDER: ICD-10-CM

## 2020-12-16 DIAGNOSIS — M25.551 CHRONIC RIGHT HIP PAIN: ICD-10-CM

## 2020-12-16 DIAGNOSIS — J45.40 MODERATE PERSISTENT ASTHMA WITHOUT COMPLICATION: ICD-10-CM

## 2020-12-16 DIAGNOSIS — R03.0 ELEVATED BLOOD PRESSURE READING WITHOUT DIAGNOSIS OF HYPERTENSION: ICD-10-CM

## 2020-12-16 DIAGNOSIS — R53.83 FATIGUE, UNSPECIFIED TYPE: Primary | ICD-10-CM

## 2020-12-16 DIAGNOSIS — N93.8 DYSFUNCTIONAL UTERINE BLEEDING: ICD-10-CM

## 2020-12-16 DIAGNOSIS — G89.29 CHRONIC RIGHT HIP PAIN: ICD-10-CM

## 2020-12-16 PROBLEM — F43.21 ADJUSTMENT DISORDER WITH DEPRESSED MOOD: Status: ACTIVE | Noted: 2020-06-22

## 2020-12-16 PROBLEM — F43.10 POSTTRAUMATIC STRESS DISORDER: Status: ACTIVE | Noted: 2020-06-25

## 2020-12-16 PROBLEM — F60.3 BORDERLINE PERSONALITY DISORDER (H): Status: ACTIVE | Noted: 2020-06-25

## 2020-12-16 PROBLEM — R10.32 ABDOMINAL PAIN, CHRONIC, BILATERAL LOWER QUADRANT: Status: ACTIVE | Noted: 2020-06-21

## 2020-12-16 PROBLEM — K59.01 SLOW TRANSIT CONSTIPATION: Status: ACTIVE | Noted: 2020-06-21

## 2020-12-16 PROBLEM — R45.851 SUICIDAL THOUGHTS: Status: ACTIVE | Noted: 2020-06-22

## 2020-12-16 PROBLEM — F31.9 BIPOLAR 1 DISORDER (H): Status: ACTIVE | Noted: 2020-06-22

## 2020-12-16 PROBLEM — Z87.898 HISTORY OF HOMICIDAL IDEATION: Status: ACTIVE | Noted: 2020-06-22

## 2020-12-16 PROBLEM — R10.31 ABDOMINAL PAIN, CHRONIC, BILATERAL LOWER QUADRANT: Status: ACTIVE | Noted: 2020-06-21

## 2020-12-16 LAB
ERYTHROCYTE [DISTWIDTH] IN BLOOD BY AUTOMATED COUNT: 12.2 % (ref 10–15)
HBA1C MFR BLD: 5.5 % (ref 0–5.6)
HCT VFR BLD AUTO: 45.9 % (ref 35–47)
HGB BLD-MCNC: 16 G/DL (ref 11.7–15.7)
MCH RBC QN AUTO: 30.4 PG (ref 26.5–33)
MCHC RBC AUTO-ENTMCNC: 34.9 G/DL (ref 31.5–36.5)
MCV RBC AUTO: 87 FL (ref 78–100)
PLATELET # BLD AUTO: 262 10E9/L (ref 150–450)
RBC # BLD AUTO: 5.26 10E12/L (ref 3.8–5.2)
WBC # BLD AUTO: 11.4 10E9/L (ref 4–11)

## 2020-12-16 PROCEDURE — 80053 COMPREHEN METABOLIC PANEL: CPT | Performed by: FAMILY MEDICINE

## 2020-12-16 PROCEDURE — 83036 HEMOGLOBIN GLYCOSYLATED A1C: CPT | Performed by: FAMILY MEDICINE

## 2020-12-16 PROCEDURE — 36415 COLL VENOUS BLD VENIPUNCTURE: CPT | Performed by: FAMILY MEDICINE

## 2020-12-16 PROCEDURE — 85027 COMPLETE CBC AUTOMATED: CPT | Performed by: FAMILY MEDICINE

## 2020-12-16 PROCEDURE — 82728 ASSAY OF FERRITIN: CPT | Performed by: FAMILY MEDICINE

## 2020-12-16 PROCEDURE — 84443 ASSAY THYROID STIM HORMONE: CPT | Performed by: FAMILY MEDICINE

## 2020-12-16 PROCEDURE — 83540 ASSAY OF IRON: CPT | Performed by: FAMILY MEDICINE

## 2020-12-16 PROCEDURE — 99214 OFFICE O/P EST MOD 30 MIN: CPT | Performed by: FAMILY MEDICINE

## 2020-12-16 PROCEDURE — 83550 IRON BINDING TEST: CPT | Performed by: FAMILY MEDICINE

## 2020-12-16 RX ORDER — NORETHINDRONE ACETATE 5 MG
5 TABLET ORAL DAILY
Qty: 5 TABLET | Refills: 0 | Status: SHIPPED | OUTPATIENT
Start: 2020-12-16 | End: 2020-12-21

## 2020-12-16 ASSESSMENT — ANXIETY QUESTIONNAIRES
5. BEING SO RESTLESS THAT IT IS HARD TO SIT STILL: MORE THAN HALF THE DAYS
GAD7 TOTAL SCORE: 20
7. FEELING AFRAID AS IF SOMETHING AWFUL MIGHT HAPPEN: NEARLY EVERY DAY
2. NOT BEING ABLE TO STOP OR CONTROL WORRYING: NEARLY EVERY DAY
7. FEELING AFRAID AS IF SOMETHING AWFUL MIGHT HAPPEN: NEARLY EVERY DAY
1. FEELING NERVOUS, ANXIOUS, OR ON EDGE: NEARLY EVERY DAY
GAD7 TOTAL SCORE: 20
3. WORRYING TOO MUCH ABOUT DIFFERENT THINGS: NEARLY EVERY DAY
6. BECOMING EASILY ANNOYED OR IRRITABLE: NEARLY EVERY DAY
4. TROUBLE RELAXING: NEARLY EVERY DAY
GAD7 TOTAL SCORE: 20

## 2020-12-16 ASSESSMENT — PATIENT HEALTH QUESTIONNAIRE - PHQ9
SUM OF ALL RESPONSES TO PHQ QUESTIONS 1-9: 25
SUM OF ALL RESPONSES TO PHQ QUESTIONS 1-9: 25
10. IF YOU CHECKED OFF ANY PROBLEMS, HOW DIFFICULT HAVE THESE PROBLEMS MADE IT FOR YOU TO DO YOUR WORK, TAKE CARE OF THINGS AT HOME, OR GET ALONG WITH OTHER PEOPLE: EXTREMELY DIFFICULT

## 2020-12-16 ASSESSMENT — MIFFLIN-ST. JEOR: SCORE: 1658.09

## 2020-12-16 NOTE — PROGRESS NOTES
"Subjective     Kailey Argueta is a 20 year old adult who presents to clinic today for the following health issues:    History of Present Illness     Asthma:  Annalisa Argueta presents for follow up of asthma.  Annalisa Argueta has some cough, some wheezing, and some shortness of breath. Annalisa Argueta is using a relief medication a few times a week. Annalisa Argueta does not have a controller medication. Patient is aware of the following triggers: cold air, emotions, exercise or sports and humidity. The patient has had a visit to the Emergency Room, Urgent Care or Hospital due to asthma since the last clinic visit. Annalisa Argueta has been to the Emergency Room or Urgent Care 0 times.Annalisa Argueta has had a Hospitalization 0 times.    Back Pain:  Annalisa Argueta presents for follow up of back pain. Patient's back pain is a chronic problem.  Location of back pain:  Right lower back, left lower back, right middle of back, right side of neck, right shoulder, right hip and right side of waist  Description of back pain: cramping, dull ache and sharp  Back pain spreads: nowhere    Since patient first noticed back pain, pain is: gradually worsening  Does back pain interfere with Annalisa Argueta's job:  Yes      Migraines:   Since the patient's last clinic visit, headaches are: worsened  The patient is getting headaches:  Daily  Annalisa Argueta is not able to do normal daily activities when Annalisa Argueta has a migraine.  The patient is taking the following rescue/relief medications:  Ibuprofen (Advil, Motrin)   Patient states \"I get no relief\" from the rescue/relief medications.   The patient is taking the following medications to prevent migraines:  No medications to prevent migraines  In the past 4 weeks, the patient has gone to an Urgent Care or Emergency Room 0 times times due to headaches.    Annalisa Argueta eats 0-1 servings of fruits and vegetables daily.Annalisa Argueta consumes 2 sweetened beverage(s) daily.Annalisa Argueta exercises with enough effort " to increase Annalisa Argueta's heart rate 20 to 29 minutes per day.  Annalisa Argueta exercises with enough effort to increase Annalisa Argueta's heart rate 3 or less days per week. Annalisa Argueta is missing 7 dose(s) of medications per week.  Annalisa Argueta is not taking prescribed medications regularly due to remembering to take.       1.  Has been having vaginal bleeding since October, very dark, looks almost black.  Has had some abnormal menstrual cycles in the past, has not been able to track them.  Blood has not been this dark, or bleeding lasted this long.  Normal cycles are 3-5 days, frequency variable.  Depo injection ended in October, got in august.  Was on nuvaring prior to that, stopped in July and began Depo.  Has not had repeat shot.    2.  Has been having extreme fatigue, uncertain if this is related to her excessive bleeding.    3.  Pain in right hip for several years, chronic, continues to be an issue.    4.  Depression and anxiety continue to be an issue, self medicates with marijuana use.    5.  Have been having SOB since she was a child, gets worse with temperature changes, needs to use inhaler.  Smoke cigarettes and marijuana daily, uses for her PTSD. ACT 14.    Current Outpatient Medications   Medication Sig Dispense Refill     albuterol (PROAIR HFA/PROVENTIL HFA/VENTOLIN HFA) 108 (90 Base) MCG/ACT inhaler Inhale 2 puffs into the lungs every 4 hours as needed for shortness of breath / dyspnea or wheezing 1 Inhaler 1     fluticasone-salmeterol (ADVAIR) 100-50 MCG/DOSE inhaler Inhale 1 puff into the lungs every 12 hours 1 Inhaler 1     gabapentin (NEURONTIN) 300 MG capsule Take 300 mg by mouth       hydrOXYzine (VISTARIL) 50 MG capsule Take 50 mg by mouth       norethindrone (AYGESTIN) 5 MG tablet Take 1 tablet (5 mg) by mouth daily for 5 days 5 tablet 0     omeprazole (PRILOSEC) 20 MG DR capsule Take 1 capsule (20 mg) by mouth daily 90 capsule 1       Review of Systems   Constitutional, HEENT, cardiovascular,  "pulmonary, gi and gu systems are negative, except as otherwise noted.      Objective    BP (!) 140/80   Pulse 86   Temp 98.8  F (37.1  C) (Oral)   Ht 1.702 m (5' 7\")   Wt 86.5 kg (190 lb 12.8 oz)   SpO2 96%   BMI 29.88 kg/m    Body mass index is 29.88 kg/m .  Physical Exam   GENERAL: healthy, alert and no distress  RESP: lungs clear to auscultation - no rales, rhonchi or wheezes  CV: regular rate and rhythm, normal S1 S2, no S3 or S4, no murmur, click or rub, no peripheral edema and peripheral pulses strong   (female): normal female external genitalia, normal urethral meatus , vaginal mucosa pink, moist, well rugated and cervical discharge of red tinged and clear mucous, brown discharge in vaginal vault  MS: no gross musculoskeletal defects noted, no edema  PSYCH: mentation appears normal and affect normal          Assessment & Plan     Fatigue, unspecified type  Multiple possible etiologies for fatigue, including but not limited to anemia due to chronic blood loss or iron deficiency, thyroid disorders, depression or anxiety, cannabis use, etc.  Will order labs today and make further recommendations pending results.  - CBC with platelets  - Iron and iron binding capacity  - Ferritin  - TSH with free T4 reflex  - Comprehensive metabolic panel (BMP + Alb, Alk Phos, ALT, AST, Total. Bili, TP)  - Hemoglobin A1c    Moderate persistent asthma without complication  Start on Advair BID, follow up in 4 weeks with repeat ACT or sooner as needed.  - fluticasone-salmeterol (ADVAIR) 100-50 MCG/DOSE inhaler; Inhale 1 puff into the lungs every 12 hours    Dysfunctional uterine bleeding  Will do short course of progesterone, follow up as needed.  - norethindrone (AYGESTIN) 5 MG tablet; Take 1 tablet (5 mg) by mouth daily for 5 days    Chronic right hip pain  - [facility] REFERRAL; Future    Tobacco use disorder  Discussed tobacco use and asthma, recommended tobacco cessation.  Patient mentioned trying to " quit and moving to a vape device, highly discouraged this.  Pt was not interested in cessation products today.    Elevated blood pressure reading without diagnosis of hypertension  Discussed high BP, pt states that she has higher pressures when she is anxious, comes to doctor, etc.  Recommended close monitoring as she may need to consider medication.    Cannabis abuse  Discussed use in pulmonary disease, as well as consideration of medications or medical marijuana for treatment.    Depression Screening Follow Up    PHQ 12/16/2020   PHQ-9 Total Score 25   Q9: Thoughts of better off dead/self-harm past 2 weeks Nearly every day   F/U: Thoughts of suicide or self-harm Yes   F/U: Self harm-plan No   F/U: Self-harm action No   F/U: Safety concerns No           Follow Up      Follow Up Actions Taken  Crisis resource information provided in the After Visit Summary  Patient declined referral.    Discussed the following ways the patient can remain in a safe environment:  be around others      There are no Patient Instructions on file for this visit.    Return in about 4 weeks (around 1/13/2021) for Asthma follow up.    Zohra Garcia MD  [facility]    Answers for HPI/ROS submitted by the patient on 12/16/2020   Chronic problems general questions HPI Form  If you checked off any problems, how difficult have these problems made it for you to do your work, take care of things at home, or get along with other people?: Extremely difficult  PHQ9 TOTAL SCORE: 25  JOE 7 TOTAL SCORE: 20

## 2020-12-17 LAB
ALBUMIN SERPL-MCNC: 4 G/DL (ref 3.4–5)
ALP SERPL-CCNC: 78 U/L (ref 40–150)
ALT SERPL W P-5'-P-CCNC: 27 U/L (ref 0–50)
ANION GAP SERPL CALCULATED.3IONS-SCNC: 4 MMOL/L (ref 3–14)
AST SERPL W P-5'-P-CCNC: 16 U/L (ref 0–45)
BILIRUB SERPL-MCNC: 0.9 MG/DL (ref 0.2–1.3)
BUN SERPL-MCNC: 12 MG/DL (ref 7–30)
CALCIUM SERPL-MCNC: 9 MG/DL (ref 8.5–10.1)
CHLORIDE SERPL-SCNC: 113 MMOL/L (ref 94–109)
CO2 SERPL-SCNC: 22 MMOL/L (ref 20–32)
CREAT SERPL-MCNC: 0.66 MG/DL (ref 0.52–1.04)
FERRITIN SERPL-MCNC: 38 NG/ML (ref 12–150)
GFR SERPL CREATININE-BSD FRML MDRD: >90 ML/MIN/{1.73_M2}
GLUCOSE SERPL-MCNC: 89 MG/DL (ref 70–99)
IRON SATN MFR SERPL: 41 % (ref 15–46)
IRON SERPL-MCNC: 138 UG/DL (ref 35–180)
POTASSIUM SERPL-SCNC: 3.9 MMOL/L (ref 3.4–5.3)
PROT SERPL-MCNC: 7.2 G/DL (ref 6.8–8.8)
SODIUM SERPL-SCNC: 139 MMOL/L (ref 133–144)
TIBC SERPL-MCNC: 333 UG/DL (ref 240–430)
TSH SERPL DL<=0.005 MIU/L-ACNC: 1.96 MU/L (ref 0.4–4)

## 2020-12-17 ASSESSMENT — ANXIETY QUESTIONNAIRES: GAD7 TOTAL SCORE: 20

## 2020-12-17 ASSESSMENT — PATIENT HEALTH QUESTIONNAIRE - PHQ9: SUM OF ALL RESPONSES TO PHQ QUESTIONS 1-9: 25

## 2020-12-17 ASSESSMENT — ASTHMA QUESTIONNAIRES: ACT_TOTALSCORE: 14

## 2020-12-22 ENCOUNTER — THERAPY VISIT (OUTPATIENT)
Dept: PHYSICAL THERAPY | Facility: CLINIC | Age: 20
End: 2020-12-22
Attending: FAMILY MEDICINE
Payer: COMMERCIAL

## 2020-12-22 DIAGNOSIS — M25.551 CHRONIC RIGHT HIP PAIN: ICD-10-CM

## 2020-12-22 DIAGNOSIS — M25.551 RIGHT HIP PAIN: ICD-10-CM

## 2020-12-22 DIAGNOSIS — G89.29 CHRONIC RIGHT HIP PAIN: ICD-10-CM

## 2020-12-22 DIAGNOSIS — D58.2 ELEVATED HEMOGLOBIN (H): Primary | ICD-10-CM

## 2020-12-22 PROCEDURE — 97110 THERAPEUTIC EXERCISES: CPT | Mod: GP | Performed by: PHYSICAL THERAPIST

## 2020-12-22 PROCEDURE — 97162 PT EVAL MOD COMPLEX 30 MIN: CPT | Mod: GP | Performed by: PHYSICAL THERAPIST

## 2020-12-22 ASSESSMENT — ACTIVITIES OF DAILY LIVING (ADL)
WALKING_INITIALLY: SLIGHT DIFFICULTY
WALKING_DOWN_STEEP_HILLS: EXTREME DIFFICULTY
HOW_WOULD_YOU_RATE_YOUR_CURRENT_LEVEL_OF_FUNCTION_DURING_YOUR_USUAL_ACTIVITIES_OF_DAILY_LIVING_FROM_0_TO_100_WITH_100_BEING_YOUR_LEVEL_OF_FUNCTION_PRIOR_TO_YOUR_HIP_PROBLEM_AND_0_BEING_THE_INABILITY_TO_PERFORM_ANY_OF_YOUR_USUAL_DAILY_ACTIVITIES?: 30
HOS_ADL_HIGHEST_POTENTIAL_SCORE: 68
ROLLING_OVER_IN_BED: MODERATE DIFFICULTY
TWISTING/PIVOTING_ON_INVOLVED_LEG: MODERATE DIFFICULTY
STEPPING_UP_AND_DOWN_CURBS: SLIGHT DIFFICULTY
RECREATIONAL_ACTIVITIES: SLIGHT DIFFICULTY
WALKING_UP_STEEP_HILLS: EXTREME DIFFICULTY
GETTING_INTO_AND_OUT_OF_A_BATHTUB: EXTREME DIFFICULTY
WALKING_APPROXIMATELY_10_MINUTES: MODERATE DIFFICULTY
HOS_ADL_COUNT: 17
HOS_ADL_SCORE(%): 45.59
GOING_DOWN_1_FLIGHT_OF_STAIRS: MODERATE DIFFICULTY
HOS_ADL_ITEM_SCORE_TOTAL: 31
PUTTING_ON_SOCKS_AND_SHOES: MODERATE DIFFICULTY
DEEP_SQUATTING: EXTREME DIFFICULTY
HEAVY_WORK: EXTREME DIFFICULTY
GETTING_INTO_AND_OUT_OF_AN_AVERAGE_CAR: MODERATE DIFFICULTY
GOING_UP_1_FLIGHT_OF_STAIRS: MODERATE DIFFICULTY
STANDING_FOR_15_MINUTES: MODERATE DIFFICULTY
WALKING_15_MINUTES_OR_GREATER: EXTREME DIFFICULTY
LIGHT_TO_MODERATE_WORK: MODERATE DIFFICULTY
SITTING_FOR_15_MINUTES: MODERATE DIFFICULTY

## 2020-12-22 NOTE — PROGRESS NOTES
"[facility] Initial Evaluation  Subjective:  The history is provided by the patient. No  was used.   Therapist Generated HPI Evaluation  Problem details: Patient reports an onset of severe hip pain beginning several years ago.  4 months ago patient awoke and \"couldn't move\" d/t hip and/or lower right abdominal pain.  She went to urgent care because she thought her \"appendix was blowing up\".  Patient c/o pain with walking, standing, squatting, and stairs.  Patient reports tingling/numbness into anterior thigh occasionally.  Physical therapy was ordered on 12/16/2020.  Pain is primarily located in right lower abdominal area into anterior hip.  Imaging of right lower abdominal has been done  .         Type of problem:  Right hip.    This is a new condition.  Condition occurred with:  Insidious onset.  Where condition occurred: for unknown reasons.  Patient reports pain:  Groin.  Pain is described as shooting, sharp and aching and is constant.  Pain radiates to:  Gluteals and thigh. Pain is worse during the night.  Since onset symptoms are gradually worsening.  Associated symptoms:  Numbness and tingling. Symptoms are exacerbated by ascending stairs, walking, standing and bending/squatting  Relieved by: sitting.  Imaging testing: none.  Past treatment: none.   Restrictions due to condition include:  Working in normal job without restrictions.  Barriers include:  None as reported by patient.    Patient Health History  Kailey Argueta being seen for My Deep Hip Pain.     Date of Onset: years - became issue 4 months ago.   Problem occurred: Unknown   Pain is reported as 7/10 on pain scale.  General health as reported by patient is fair.  Pertinent medical history includes: asthma, concussions/dizziness, depression, high blood pressure, mental illness, migraines/headaches, numbness/tingling and smoking. Other medical history details: no weight loss recently.   Red flags:  Significant " weakness and unexplained weight loss.  Medical allergies: other.   Surgeries include:  Other. Other surgery history details: .    Current medications:  Anti-inflammatory. Other medications details: Anti-anxiety, Asthma.    Current occupation is Retail.   Primary job tasks include:  Prolonged standing, lifting/carrying and repetitive tasks.                                    Objective:  Standing Alignment:        Lumbar:  Lordosis incr            Gait:    Gait Type:  Normal and antalgic                    Lumbar/SI Evaluation  ROM:    AROM Lumbar:   Flexion:        WNL (+) anterior hip pain  Ext:                    WNL (-)   Side Bend:        Left:     Right:   Rotation:           Left:     Right:   Side Glide:        Left:     Right:         Strength: Poor core strength                                                      Hip Evaluation  HIP AROM:    Flexion: Left:   Right:  WNL - pain with SLR    Extension: Left:   Right:  WNL  Abduction: Left:    Right:  WNL      Internal Rotation: Left:   Right: WNL  External Rotation: Left:   Right: WNL      Hip PROM:    Flexion: Left:  Right: WNL    Abduction: Left:   Right: WNL    Internal Rotation: Left:   Right: WNL  External Rotation: Left:   Right: WNL              Hip Strength:    Flexion:   Right: 4+/5   +  Pain:                    Extension:  Right: 5-/5    +/-  Pain:    Abduction:  Right: 5-/5   +/-   Pain:                  Hip Special Testing:      Right hip negative for the following special tests:  Bryan; Fadir/Labrum; SLR or Femoral Nerve    Hip Palpation:  Not Assessed                    General     ROS    Assessment/Plan:    Patient is a 20 year old adult with right side hip complaints.    Patient has the following significant findings with corresponding treatment plan.                Diagnosis 1:  Hip pain  Pain -  self management, education and home program  Decreased ROM/flexibility - therapeutic exercise, therapeutic activity and home  program  Decreased strength - therapeutic exercise, therapeutic activities and home program  Impaired gait - gait training and home program  Impaired muscle performance - neuro re-education and home program  Decreased function - therapeutic activities and home program    Therapy Evaluation Codes:   1) History comprised of:   Personal factors that impact the plan of care:      Anxiety.    Comorbidity factors that impact the plan of care are:      Depression and Mental Health.     Medications impacting care: Anti-anxiety .  2) Examination of Body Systems comprised of:   Body structures and functions that impact the plan of care:      Hip.   Activity limitations that impact the plan of care are:      Squatting/kneeling, Stairs, Standing and Walking.  3) Clinical presentation characteristics are:   Evolving/Changing.  4) Decision-Making    Moderate complexity using standardized patient assessment instrument and/or measureable assessment of functional outcome.  Cumulative Therapy Evaluation is: Moderate complexity.    Previous and current functional limitations:  (See Goal Flow Sheet for this information)    Short term and Long term goals: (See Goal Flow Sheet for this information)     Communication ability:  Patient appears to be able to clearly communicate and understand verbal and written communication and follow directions correctly.  Treatment Explanation - The following has been discussed with the patient:   RX ordered/plan of care  Anticipated outcomes  Possible risks and side effects  This patient would benefit from PT intervention to resume normal activities.   Rehab potential is good.    Frequency:  1 X week, once daily  Duration:  for 6 weeks  Discharge Plan:  Achieve all LTG.  Independent in home treatment program.  Reach maximal therapeutic benefit.    Please refer to the daily flowsheet for treatment today, total treatment time and time spent performing 1:1 timed codes.

## 2021-01-15 ENCOUNTER — E-VISIT (OUTPATIENT)
Dept: FAMILY MEDICINE | Facility: CLINIC | Age: 21
End: 2021-01-15
Payer: COMMERCIAL
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
## 2021-01-15 NOTE — TELEPHONE ENCOUNTER
Provider E-Visit time total (minutes): 5    Called patient regarding need for letter.  Patient states that she is in a lot of pain from her fall, needs a doctor letter for work.  Did not elaborate on what it should say.     I recommended that if she is in a lot of pain, she should be evaluated by a provider before a note is written.  She verbalized understanding.  Cancel e-visit.

## 2021-01-19 ENCOUNTER — ALLIED HEALTH/NURSE VISIT (OUTPATIENT)
Dept: FAMILY MEDICINE | Facility: CLINIC | Age: 21
End: 2021-01-19
Payer: COMMERCIAL

## 2021-01-19 ASSESSMENT — ASTHMA QUESTIONNAIRES
QUESTION_2 LAST FOUR WEEKS HOW OFTEN HAVE YOU HAD SHORTNESS OF BREATH: THREE TO SIX TIMES A WEEK
QUESTION_3 LAST FOUR WEEKS HOW OFTEN DID YOUR ASTHMA SYMPTOMS (WHEEZING, COUGHING, SHORTNESS OF BREATH, CHEST TIGHTNESS OR PAIN) WAKE YOU UP AT NIGHT OR EARLIER THAN USUAL IN THE MORNING: ONCE OR TWICE
QUESTION_1 LAST FOUR WEEKS HOW MUCH OF THE TIME DID YOUR ASTHMA KEEP YOU FROM GETTING AS MUCH DONE AT WORK, SCHOOL OR AT HOME: SOME OF THE TIME
QUESTION_4 LAST FOUR WEEKS HOW OFTEN HAVE YOU USED YOUR RESCUE INHALER OR NEBULIZER MEDICATION (SUCH AS ALBUTEROL): TWO OR THREE TIMES PER WEEK
ACT_TOTALSCORE: 16
QUESTION_5 LAST FOUR WEEKS HOW WOULD YOU RATE YOUR ASTHMA CONTROL: SOMEWHAT CONTROLLED

## 2021-01-20 ASSESSMENT — ASTHMA QUESTIONNAIRES: ACT_TOTALSCORE: 16

## 2021-01-28 ENCOUNTER — PATIENT OUTREACH (OUTPATIENT)
Dept: FAMILY MEDICINE | Facility: CLINIC | Age: 21
End: 2021-01-28

## 2021-01-28 NOTE — TELEPHONE ENCOUNTER
Darshana message sent as RN PAL outreach. Patient due for f/u on asthma as well as preventative care visit.     Mitchell HERNANDEZ RN

## 2021-02-01 PROBLEM — M25.551 RIGHT HIP PAIN: Status: RESOLVED | Noted: 2020-12-22 | Resolved: 2021-02-01

## 2021-03-02 ENCOUNTER — OFFICE VISIT (OUTPATIENT)
Dept: FAMILY MEDICINE | Facility: CLINIC | Age: 21
End: 2021-03-02
Payer: COMMERCIAL

## 2021-03-02 VITALS
WEIGHT: 192.8 LBS | SYSTOLIC BLOOD PRESSURE: 118 MMHG | BODY MASS INDEX: 30.26 KG/M2 | OXYGEN SATURATION: 97 % | TEMPERATURE: 98.6 F | HEIGHT: 67 IN | DIASTOLIC BLOOD PRESSURE: 60 MMHG | HEART RATE: 88 BPM

## 2021-03-02 DIAGNOSIS — J45.40 MODERATE PERSISTENT ASTHMA WITHOUT COMPLICATION: Primary | ICD-10-CM

## 2021-03-02 DIAGNOSIS — Z23 NEED FOR VACCINATION: ICD-10-CM

## 2021-03-02 DIAGNOSIS — Z23 NEED FOR PROPHYLACTIC VACCINATION AND INOCULATION AGAINST INFLUENZA: ICD-10-CM

## 2021-03-02 DIAGNOSIS — R06.2 WHEEZING: ICD-10-CM

## 2021-03-02 PROBLEM — F31.9 BIPOLAR 1 DISORDER (H): Status: RESOLVED | Noted: 2020-06-22 | Resolved: 2021-03-02

## 2021-03-02 PROCEDURE — 90715 TDAP VACCINE 7 YRS/> IM: CPT | Performed by: FAMILY MEDICINE

## 2021-03-02 PROCEDURE — 90472 IMMUNIZATION ADMIN EACH ADD: CPT | Performed by: FAMILY MEDICINE

## 2021-03-02 PROCEDURE — 90471 IMMUNIZATION ADMIN: CPT | Performed by: FAMILY MEDICINE

## 2021-03-02 PROCEDURE — 90686 IIV4 VACC NO PRSV 0.5 ML IM: CPT | Performed by: FAMILY MEDICINE

## 2021-03-02 PROCEDURE — 99213 OFFICE O/P EST LOW 20 MIN: CPT | Mod: 25 | Performed by: FAMILY MEDICINE

## 2021-03-02 RX ORDER — ALBUTEROL SULFATE 90 UG/1
1-2 POWDER, METERED RESPIRATORY (INHALATION) EVERY 4 HOURS PRN
Qty: 1 EACH | Refills: 1 | Status: SHIPPED | OUTPATIENT
Start: 2021-03-02 | End: 2022-02-02

## 2021-03-02 ASSESSMENT — ASTHMA QUESTIONNAIRES
ACT_TOTALSCORE: 16
QUESTION_5 LAST FOUR WEEKS HOW WOULD YOU RATE YOUR ASTHMA CONTROL: SOMEWHAT CONTROLLED
QUESTION_1 LAST FOUR WEEKS HOW MUCH OF THE TIME DID YOUR ASTHMA KEEP YOU FROM GETTING AS MUCH DONE AT WORK, SCHOOL OR AT HOME: A LITTLE OF THE TIME
QUESTION_3 LAST FOUR WEEKS HOW OFTEN DID YOUR ASTHMA SYMPTOMS (WHEEZING, COUGHING, SHORTNESS OF BREATH, CHEST TIGHTNESS OR PAIN) WAKE YOU UP AT NIGHT OR EARLIER THAN USUAL IN THE MORNING: TWO OR THREE NIGHTS A WEEK
QUESTION_2 LAST FOUR WEEKS HOW OFTEN HAVE YOU HAD SHORTNESS OF BREATH: THREE TO SIX TIMES A WEEK
QUESTION_4 LAST FOUR WEEKS HOW OFTEN HAVE YOU USED YOUR RESCUE INHALER OR NEBULIZER MEDICATION (SUCH AS ALBUTEROL): ONCE A WEEK OR LESS

## 2021-03-02 ASSESSMENT — ANXIETY QUESTIONNAIRES
GAD7 TOTAL SCORE: 9
5. BEING SO RESTLESS THAT IT IS HARD TO SIT STILL: SEVERAL DAYS
2. NOT BEING ABLE TO STOP OR CONTROL WORRYING: SEVERAL DAYS
1. FEELING NERVOUS, ANXIOUS, OR ON EDGE: MORE THAN HALF THE DAYS
6. BECOMING EASILY ANNOYED OR IRRITABLE: NOT AT ALL
4. TROUBLE RELAXING: MORE THAN HALF THE DAYS
7. FEELING AFRAID AS IF SOMETHING AWFUL MIGHT HAPPEN: MORE THAN HALF THE DAYS
GAD7 TOTAL SCORE: 9
7. FEELING AFRAID AS IF SOMETHING AWFUL MIGHT HAPPEN: MORE THAN HALF THE DAYS
3. WORRYING TOO MUCH ABOUT DIFFERENT THINGS: SEVERAL DAYS
GAD7 TOTAL SCORE: 9

## 2021-03-02 ASSESSMENT — PATIENT HEALTH QUESTIONNAIRE - PHQ9
SUM OF ALL RESPONSES TO PHQ QUESTIONS 1-9: 10
10. IF YOU CHECKED OFF ANY PROBLEMS, HOW DIFFICULT HAVE THESE PROBLEMS MADE IT FOR YOU TO DO YOUR WORK, TAKE CARE OF THINGS AT HOME, OR GET ALONG WITH OTHER PEOPLE: VERY DIFFICULT
SUM OF ALL RESPONSES TO PHQ QUESTIONS 1-9: 10

## 2021-03-02 ASSESSMENT — MIFFLIN-ST. JEOR: SCORE: 1662.17
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
## 2021-03-03 ASSESSMENT — ANXIETY QUESTIONNAIRES: GAD7 TOTAL SCORE: 9

## 2021-03-03 ASSESSMENT — ASTHMA QUESTIONNAIRES: ACT_TOTALSCORE: 16

## 2021-03-03 ASSESSMENT — PATIENT HEALTH QUESTIONNAIRE - PHQ9: SUM OF ALL RESPONSES TO PHQ QUESTIONS 1-9: 10

## 2021-03-18 ENCOUNTER — NURSE TRIAGE (OUTPATIENT)
Dept: NURSING | Facility: CLINIC | Age: 21
End: 2021-03-18

## 2021-03-18 NOTE — TELEPHONE ENCOUNTER
Having a lot of back pain    Started at 11:30pm last night    Every time cough, pain increases  On the floor a little bit ago due to pain   Can hardly walk     Rates pain 8.5-10/10  Lower back  Constant with worsening waves  Base of spine at rest  When walking it is shooting pain   -shoots/stabbing in that area  -states she is now unable to walk    Left leg is a little tingling  Cannot move at all without causing pain    Has taken advil and hydroxyzine at 2am with no relief    No known injury   No fever  No shooting pain in legs  No constipation/diarrhea  No nausea/vomiting  No dizziness/lightheadedness  No abdominal pain   No urinary problems  No urinary or stool incontinence   No numbness in groin/rectum  Denies pregnancy, LMP 3 weeks ago    Triaged to a disposition of Go to ED. Patient is agreeable.     COVID 19 Nurse Triage Plan/Patient Instructions    Please be aware that novel coronavirus (COVID-19) may be circulating in the community. If you develop symptoms such as fever, cough, or SOB or if you have concerns about the presence of another infection including coronavirus (COVID-19), please contact your health care provider or visit [email or web address].     Disposition/Instructions    ED Visit recommended. Follow protocol based instructions.     Bring Your Own Device:  Please also bring your smart device(s) (smart phones, tablets, laptops) and their charging cables for your personal use and to communicate with your care team during your visit.    Thank you for taking steps to prevent the spread of this virus.  o Limit your contact with others.  o Wear a simple mask to cover your cough.  o Wash your hands well and often.    Resources    [facility]: About COVID-19: [email or web address]    CDC: What to Do If You're Sick: www.cdc.gov/coronavirus/2019-ncov/about/steps-when-sick.html    CDC: Ending Home Isolation: www.cdc.gov/coronavirus/2019-ncov/hcp/disposition-in-home-patients.html      CDC: Caring for Someone: www.cdc.gov/coronavirus/2019-ncov/if-you-are-sick/care-for-someone.html     [facility]: Interim Guidance for Hospital Discharge to Home: www.health.[facility].[place]/diseases/coronavirus/hcp/hospdischarge.pdf    [facility] clinical trials (COVID-19 research studies): [email or web address]     Below are the COVID-19 hotlines at the [place] Department of Health ([facility]). Interpreters are available.   o For health questions: Call 890-644-8188 or 1-307.360.8529 (7 a.m. to 7 p.m.)  o For questions about schools and childcare: Call 200-045-0070 or 1-285.778.4985 (7 a.m. to 7 p.m.)     Reason for Disposition    Unable to walk    Additional Information    Negative: Passed out (i.e., lost consciousness, collapsed and was not responding)    Negative: Shock suspected (e.g., cold/pale/clammy skin, too weak to stand, low BP, rapid pulse)    Negative: Sounds like a life-threatening emergency to the triager    Negative: Major injury to the back (e.g., MVA, fall > 10 feet or 3 meters, penetrating injury, etc.)    Negative: Followed a tailbone injury    Negative: [1] Pain in the upper back over the ribs (rib cage) AND [2] radiates (travels, goes) into chest    Negative: [1] Pain in the upper back over the ribs (rib cage) AND [2] worsened by coughing (or clearly increases with breathing)    Negative: Back pain during pregnancy    Negative: Pain mainly in flank (i.e., in the side, over the lower ribs or just below the ribs)    Negative: [1] SEVERE back pain (e.g., excruciating) AND [2] sudden onset AND [3] age > 60    Negative: [1] Unable to urinate (or only a few drops) > 4 hours AND [2] bladder feels very full (e.g., palpable bladder or strong urge to urinate)    Negative: [1] Loss of bladder or bowel control (urine or bowel incontinence; wetting self, leaking stool) AND [2] new onset    Negative: Numbness in groin or rectal area (i.e., loss of sensation)    Negative: [1] SEVERE  abdominal pain AND [2] present > 1 hour    Negative: [1] Abdominal pain AND [2] age > 60    Negative: Weakness of a leg or foot (e.g., unable to bear weight, dragging foot)    Protocols used: BACK PAIN-A-AH    Serena Sarkar RN on 3/18/2021 at 2:48 AM

## 2021-04-06 ENCOUNTER — OFFICE VISIT (OUTPATIENT)
Dept: FAMILY MEDICINE | Facility: CLINIC | Age: 21
End: 2021-04-06
Payer: COMMERCIAL

## 2021-04-06 VITALS
BODY MASS INDEX: 30.23 KG/M2 | HEART RATE: 105 BPM | WEIGHT: 193 LBS | SYSTOLIC BLOOD PRESSURE: 110 MMHG | OXYGEN SATURATION: 99 % | TEMPERATURE: 98.3 F | DIASTOLIC BLOOD PRESSURE: 79 MMHG

## 2021-04-06 DIAGNOSIS — J45.40 MODERATE PERSISTENT ASTHMA WITHOUT COMPLICATION: Primary | ICD-10-CM

## 2021-04-06 PROCEDURE — 99213 OFFICE O/P EST LOW 20 MIN: CPT | Performed by: FAMILY MEDICINE

## 2021-04-06 ASSESSMENT — ASTHMA QUESTIONNAIRES
QUESTION_1 LAST FOUR WEEKS HOW MUCH OF THE TIME DID YOUR ASTHMA KEEP YOU FROM GETTING AS MUCH DONE AT WORK, SCHOOL OR AT HOME: A LITTLE OF THE TIME
QUESTION_4 LAST FOUR WEEKS HOW OFTEN HAVE YOU USED YOUR RESCUE INHALER OR NEBULIZER MEDICATION (SUCH AS ALBUTEROL): TWO OR THREE TIMES PER WEEK
QUESTION_3 LAST FOUR WEEKS HOW OFTEN DID YOUR ASTHMA SYMPTOMS (WHEEZING, COUGHING, SHORTNESS OF BREATH, CHEST TIGHTNESS OR PAIN) WAKE YOU UP AT NIGHT OR EARLIER THAN USUAL IN THE MORNING: ONCE OR TWICE
QUESTION_5 LAST FOUR WEEKS HOW WOULD YOU RATE YOUR ASTHMA CONTROL: WELL CONTROLLED
ACT_TOTALSCORE: 19
QUESTION_2 LAST FOUR WEEKS HOW OFTEN HAVE YOU HAD SHORTNESS OF BREATH: ONCE OR TWICE A WEEK

## 2021-04-06 NOTE — PROGRESS NOTES
Assessment & Plan     Moderate persistent asthma without complication  ACT improved but still uncontrolled and patient still symptomatic.  Increase Advair, follow up in 4 weeks or sooner as needed.  - fluticasone-salmeterol (ADVAIR) 500-50 MCG/DOSE inhaler; Inhale 1 puff into the lungs every 12 hours    11 minutes spent on the date of the encounter doing chart review, history and exam, documentation and further activities per the note       See Patient Instructions    Return in about 4 weeks (around 5/4/2021) for Asthma Recheck.    Zohra Garcia MD  [facility] YASMIN Fang is a 21 year old who presents for the following health issues     HPI     Asthma Follow-Up    Was ACT completed today?    Yes    ACT Total Scores 4/6/2021   ACT TOTAL SCORE (Goal Greater than or Equal to 20) 19   In the past 12 months, how many times did you visit the emergency room for your asthma without being admitted to the hospital? 0   In the past 12 months, how many times were you hospitalized overnight because of your asthma? 0     Feels pretty good overall, wonders if she has some seasonal allergies.  Took a claritin, felt like things got a little better.  Does feel like she is not getting enough air on occasion, but usually is fine.      How many days per week do you miss taking your asthma controller medication?  1    Please describe any recent triggers for your asthma: exercise or sports and coughing too much    Have you had any Emergency Room Visits, Urgent Care Visits, or Hospital Admissions since your last office visit?  No      Review of Systems   Constitutional, HEENT, cardiovascular, pulmonary, gi and gu systems are negative, except as otherwise noted.      Objective    /79   Pulse 105   Temp 98.3  F (36.8  C) (Oral)   Wt 87.5 kg (193 lb)   LMP 02/28/2021 (Exact Date)   SpO2 99%   BMI 30.23 kg/m    Body mass index is 30.23 kg/m .  Physical Exam   GENERAL: healthy, alert  and no distress  RESP: lungs clear to auscultation - no rales, rhonchi or wheezes

## 2021-04-06 NOTE — LETTER
My Asthma Action Plan    Name: Kailey Argueta   YOB: 2000  Date: 4/6/2021   My doctor: Zohra Garcia MD   My clinic: [facility]        My Control Medicine:    My Rescue Medicine: Albuterol (Proair/Ventolin/Proventil HFA) 2-4 puffs EVERY 4 HOURS as needed. Use a spacer if recommended by your provider.   My Asthma Severity:   Mild Persistent  Know your asthma triggers: exercise or sports  exercise or sports  coughing too much            GREEN ZONE   Good Control    I feel good    No cough or wheeze    Can work, sleep and play without asthma symptoms       Take your asthma control medicine every day.     1. If exercise triggers your asthma, take your rescue medication    15 minutes before exercise or sports, and    During exercise if you have asthma symptoms  2. Spacer to use with inhaler: If you have a spacer, make sure to use it with your inhaler             YELLOW ZONE Getting Worse  I have ANY of these:    I do not feel good    Cough or wheeze    Chest feels tight    Wake up at night   1. Keep taking your Green Zone medications  2. Start taking your rescue medicine:    every 20 minutes for up to 1 hour. Then every 4 hours for 24-48 hours.  3. If you stay in the Yellow Zone for more than 12-24 hours, contact your doctor.  4. If you do not return to the Green Zone in 12-24 hours or you get worse, start taking your oral steroid medicine if prescribed by your provider.           RED ZONE Medical Alert - Get Help  I have ANY of these:    I feel awful    Medicine is not helping    Breathing getting harder    Trouble walking or talking    Nose opens wide to breathe       1. Take your rescue medicine NOW  2. If your provider has prescribed an oral steroid medicine, start taking it NOW  3. Call your doctor NOW  4. If you are still in the Red Zone after 20 minutes and you have not reached your doctor:    Take your rescue medicine again and    Call 911 or go to the emergency  room right away    See your regular doctor within 2 weeks of an Emergency Room or Urgent Care visit for follow-up treatment.          Annual Reminders:  Meet with Asthma Educator,  Flu Shot in the Fall, consider Pneumonia Vaccination for patients with asthma (aged 19 and older).    Pharmacy: [id number] [address]    Electronically signed by Zohra Garcia MD   Date: 04/06/21                      Asthma Triggers  How To Control Things That Make Your Asthma Worse    Triggers are things that make your asthma worse.  Look at the list below to help you find your triggers and what you can do about them.  You can help prevent asthma flare-ups by staying away from your triggers.      Trigger                                                          What you can do   Cigarette Smoke  Tobacco smoke can make asthma worse. Do not allow smoking in your home, car or around you.  Be sure no one smokes at a child s day care or school.  If you smoke, ask your health care provider for ways to help you quit.  Ask family members to quit too.  Ask your health care provider for a referral to Quit Plan to help you quit smoking, or call 0-525-703-PLAN.     Colds, Flu, Bronchitis  These are common triggers of asthma. Wash your hands often.  Don t touch your eyes, nose or mouth.  Get a flu shot every year.     Dust Mites  These are tiny bugs that live in cloth or carpet. They are too small to see. Wash sheets and blankets in hot water every week.   Encase pillows and mattress in dust mite proof covers.  Avoid having carpet if you can. If you have carpet, vacuum weekly.   Use a dust mask and HEPA vacuum.   Pollen and Outdoor Mold  Some people are allergic to trees, grass, or weed pollen, or molds. Try to keep your windows closed.  Limit time out doors when pollen count is high.   Ask you health care provider about taking medicine during allergy season.     Animal Dander  Some people are allergic to  skin flakes, urine or saliva from pets with fur or feathers. Keep pets with fur or feathers out of your home.    If you can t keep the pet outdoors, then keep the pet out of your bedroom.  Keep the bedroom door closed.  Keep pets off cloth furniture and away from stuffed toys.     Mice, Rats, and Cockroaches   Some people are allergic to the waste from these pests.   Cover food and garbage.  Clean up spills and food crumbs.  Store grease in the refrigerator.   Keep food out of the bedroom.   Indoor Mold  This can be a trigger if your home has high moisture. Fix leaking faucets, pipes, or other sources of water.   Clean moldy surfaces.  Dehumidify basement if it is damp and smelly.   Smoke, Strong Odors, and Sprays  These can reduce air quality. Stay away from strong odors and sprays, such as perfume, powder, hair spray, paints, smoke incense, paint, cleaning products, candles and new carpet.   Exercise or Sports  Some people with asthma have this trigger. Be active!  Ask your doctor about taking medicine before sports or exercise to prevent symptoms.    Warm up for 5-10 minutes before and after sports or exercise.     Other Triggers of Asthma  Cold air:  Cover your nose and mouth with a scarf.  Sometimes laughing or crying can be a trigger.  Some medicines and food can trigger asthma.

## 2021-04-07 ENCOUNTER — VIRTUAL VISIT (OUTPATIENT)
Dept: ONCOLOGY | Facility: CLINIC | Age: 21
End: 2021-04-07
Attending: INTERNAL MEDICINE
Payer: COMMERCIAL

## 2021-04-07 VITALS — WEIGHT: 193 LBS | BODY MASS INDEX: 30.23 KG/M2

## 2021-04-07 DIAGNOSIS — D58.2 ELEVATED HEMOGLOBIN (H): Primary | ICD-10-CM

## 2021-04-07 PROCEDURE — 99212 OFFICE O/P EST SF 10 MIN: CPT | Mod: 95 | Performed by: INTERNAL MEDICINE

## 2021-04-07 RX ORDER — ACETAMINOPHEN 500 MG
500-1000 TABLET ORAL EVERY 6 HOURS PRN
COMMUNITY

## 2021-04-07 ASSESSMENT — PAIN SCALES - GENERAL: PAINLEVEL: SEVERE PAIN (7)

## 2021-04-07 ASSESSMENT — ASTHMA QUESTIONNAIRES: ACT_TOTALSCORE: 19

## 2021-04-07 NOTE — LETTER
4/7/2021         RE: Kailey Argueta  [address] [place]  [place] [address]        Dear Colleague,    Thank you for referring your patient, Kailey Argueta, to the [facility]. Please see a copy of my visit note below.    Annalisa is a 21 year old who is being evaluated via a billable video visit.      How would you like to obtain your AVS? MyChart  If the video visit is dropped, the invitation should be resent by: Text to cell phone: [phone]  Will anyone else be joining your video visit? No     Video Start Time:  2:32 pm    Video-Visit Details    Type of service:  Video Visit    Video End Time: 2:35 pm    Originating Location (pt. Location): Home    Distant Location (provider location):  [facility]     Platform used for Video Visit: Rip Guzman CMA      [facility]    Hematology/Oncology Established Patient Note      Today's Date: 4/07/21    Reason for Follow-up: Elevated hemoglobin      HISTORY OF PRESENT ILLNESS: Kailey Argueta is a 21 year old adult with PMHx of HTN, bipolar 1 disorder, anxiety, ADHD, who presents with elevated hemoglobin.   Annalisa says that she feels tired.  Annalisa does smoke about 3-4 cigarettes a day for the past couple of years.  Annalisa denies having sleep apnea, but notes having family history of it.    On chart review, labs show a mildly elevated hemoglobin in the 15-16 range since September 2019.  In May 2019, CBC was completely normal.        INTERIM HISTORY: Annalisa is doing well.  She has established care with a PCP now.        REVIEW OF SYSTEMS:   14 point ROS was reviewed and is negative other than as noted above in HPI.       HOME MEDICATIONS:  Current Outpatient Medications   Medication Sig Dispense Refill     acetaminophen (TYLENOL) 500 MG tablet Take 500-1,000 mg by mouth every 6 hours as needed for mild pain       albuterol (PROAIR RESPICLICK) 108 (90 Base) MCG/ACT inhaler Inhale 1-2  puffs into the lungs every 4 hours as needed for shortness of breath / dyspnea or wheezing 1 each 1     fluticasone-salmeterol (ADVAIR) 500-50 MCG/DOSE inhaler Inhale 1 puff into the lungs every 12 hours 60 each 5     gabapentin (NEURONTIN) 300 MG capsule Take 300 mg by mouth 2 times daily       hydrOXYzine (VISTARIL) 50 MG capsule Take 50 mg by mouth       omeprazole (PRILOSEC) 20 MG DR capsule Take 1 capsule (20 mg) by mouth daily 90 capsule 1         ALLERGIES:  Allergies   Allergen Reactions     Kiwi Other (See Comments)     Mild throat swelling per pt; able to have artificially sweetened kiwi flavor         PAST MEDICAL HISTORY:  Past Medical History:   Diagnosis Date     ADHD (attention deficit hyperactivity disorder)      Anxiety      Bipolar 1 disorder (H)      Headache      Hypertension      IUD contraception 01/2019    post ab         PAST SURGICAL HISTORY:  Past Surgical History:   Procedure Laterality Date     AS INDUCED ABORTN BY D&C  01/2019    16 weeks. PP         SOCIAL HISTORY:  Social History     Socioeconomic History     Marital status: Single     Spouse name: Not on file     Number of children: Not on file     Years of education: Not on file     Highest education level: Not on file   Occupational History     Not on file   Social Needs     Financial resource strain: Not on file     Food insecurity     Worry: Not on file     Inability: Not on file     Transportation needs     Medical: Not on file     Non-medical: Not on file   Tobacco Use     Smoking status: Current Every Day Smoker     Packs/day: 0.45     Types: Cigarettes     Smokeless tobacco: Never Used     Tobacco comment: 8 cigs per day   Substance and Sexual Activity     Alcohol use: Yes     Alcohol/week: 0.0 standard drinks     Comment: 0-1x month     Drug use: Yes     Frequency: 7.0 times per week     Types: Marijuana     Comment: 3-4x daily for pain     Sexual activity: Not Currently     Partners: Female, Male     Comment: [facility]  2019   Lifestyle     Physical activity     Days per week: Not on file     Minutes per session: Not on file     Stress: Not on file   Relationships     Social connections     Talks on phone: Not on file     Gets together: Not on file     Attends Restoration service: Not on file     Active member of club or organization: Not on file     Attends meetings of clubs or organizations: Not on file     Relationship status: Not on file     Intimate partner violence     Fear of current or ex partner: Not on file     Emotionally abused: Not on file     Physically abused: Not on file     Forced sexual activity: Not on file   Other Topics Concern     Parent/sibling w/ CABG, MI or angioplasty before 65F 55M? Not Asked   Social History Narrative     Not on file         FAMILY HISTORY:  Family History   Problem Relation Age of Onset     Diabetes Mother      Hypertension Mother      Skin Cancer Mother      Diabetes Father      Endometriosis Sister          PHYSICAL EXAM:  ECO  GENERAL/CONSTITUTIONAL: No acute distress. Healthy, alert.  RESPIRATORY: No audible wheeze, cough, or visible cyanosis.  No visible retractions or increased work of breathing.  Able to speak fully in complete sentences.  NEUROLOGIC: Alert, oriented, answers questions appropriately. No tremor. Mentation intact and speech normal.  PSYCHIATRIC:  Mentation appears normal, affect normal/bright, judgement and insight intact, normal speech and appearance well-groomed.    The rest of a comprehensive physical exam is deferred due to public health emergency video visit restrictions.        LABS:  CBC RESULTS:   Recent Labs   Lab Test 20  1616   WBC 11.4*   RBC 5.26*   HGB 16.0*   HCT 45.9   MCV 87   MCH 30.4   MCHC 34.9   RDW 12.2        Component      Latest Ref Rng & Units 2020   Iron      35 - 180 ug/dL 138   Iron Binding Cap      240 - 430 ug/dL 333   Iron Saturation Index      15 - 46 % 41   Ferritin      12 - 150 ng/mL 38         Peripheral  smear 9/22/20:  FINAL DIAGNOSIS:   Peripheral blood.   - Hematocrit slightly elevated.  Morphologically nonspecific.     COMMENT:   Current hemoglobin is within normal limits.  The hematocrit is slightly   elevated.  Potential causes of the   slight elevated hematocrit include but are not limited too, various   cardiopulmonary diseases, smoking, sleep   apnea, elevated erythropoietin, spurious, dehydration, and less likely   early chronic myeloproliferative   neoplasms etc.  Clinical correlation is required.       ASSESSMENT/PLAN:  Kailey Argueta is a 21 year old adult with:    Elevated hemoglobin: We discussed possible causes, including smoking.  I advised complete smoking cessation.  Lab evaluation was essentially unremarkable, including negative myeloproliferative panel.  Of note, CT abdomen/pelvis on 6/11/20 showed no hepatosplenomegaly.    Last CBC was done in December 2020.  Hemoglobin remains slightly elevated at 16.    Occasional mildly elevated hemoglobin is likely due to smoking.  She also has asthma.  I again advised smoking cessation.      She has a PCP now.  She will follow-up with PCP with annual CBC.  She can follow-up with hematology as needed.      Jenny Rodriguez MD  Hematology/Oncology  [facility]      Total time spent on day of visit, including review of tests, obtaining/reviewing separately obtained history, ordering medications/tests/procedures, communicating with PCP/consultants, and documenting in electronic medical record: 10 minutes            Again, thank you for allowing me to participate in the care of your patient.        Sincerely,        Jenny Rodriguez MD

## 2021-04-07 NOTE — PROGRESS NOTES
Annalisa is a 21 year old who is being evaluated via a billable video visit.      How would you like to obtain your AVS? MyChart  If the video visit is dropped, the invitation should be resent by: Text to cell phone: [phone]  Will anyone else be joining your video visit? No     Video Start Time:  2:32 pm    Video-Visit Details    Type of service:  Video Visit    Video End Time: 2:35 pm    Originating Location (pt. Location): Home    Distant Location (provider location):  [facility]     Platform used for Video Visit: Rip Guzman CMA      [facility]    Hematology/Oncology Established Patient Note      Today's Date: 4/07/21    Reason for Follow-up: Elevated hemoglobin      HISTORY OF PRESENT ILLNESS: Kailey Argueta is a 21 year old adult with PMHx of HTN, bipolar 1 disorder, anxiety, ADHD, who presents with elevated hemoglobin.   Annalisa says that she feels tired.  Annalisa does smoke about 3-4 cigarettes a day for the past couple of years.  Annalisa denies having sleep apnea, but notes having family history of it.    On chart review, labs show a mildly elevated hemoglobin in the 15-16 range since September 2019.  In May 2019, CBC was completely normal.        INTERIM HISTORY: Annalisa is doing well.  She has established care with a PCP now.        REVIEW OF SYSTEMS:   14 point ROS was reviewed and is negative other than as noted above in HPI.       HOME MEDICATIONS:  Current Outpatient Medications   Medication Sig Dispense Refill     acetaminophen (TYLENOL) 500 MG tablet Take 500-1,000 mg by mouth every 6 hours as needed for mild pain       albuterol (PROAIR RESPICLICK) 108 (90 Base) MCG/ACT inhaler Inhale 1-2 puffs into the lungs every 4 hours as needed for shortness of breath / dyspnea or wheezing 1 each 1     fluticasone-salmeterol (ADVAIR) 500-50 MCG/DOSE inhaler Inhale 1 puff into the lungs every 12 hours 60 each 5     gabapentin (NEURONTIN) 300 MG capsule  Take 300 mg by mouth 2 times daily       hydrOXYzine (VISTARIL) 50 MG capsule Take 50 mg by mouth       omeprazole (PRILOSEC) 20 MG DR capsule Take 1 capsule (20 mg) by mouth daily 90 capsule 1         ALLERGIES:  Allergies   Allergen Reactions     Kiwi Other (See Comments)     Mild throat swelling per pt; able to have artificially sweetened kiwi flavor         PAST MEDICAL HISTORY:  Past Medical History:   Diagnosis Date     ADHD (attention deficit hyperactivity disorder)      Anxiety      Bipolar 1 disorder (H)      Headache      Hypertension      IUD contraception 01/2019    post ab         PAST SURGICAL HISTORY:  Past Surgical History:   Procedure Laterality Date     AS INDUCED ABORTN BY D&C  01/2019    16 weeks. PP         SOCIAL HISTORY:  Social History     Socioeconomic History     Marital status: Single     Spouse name: Not on file     Number of children: Not on file     Years of education: Not on file     Highest education level: Not on file   Occupational History     Not on file   Social Needs     Financial resource strain: Not on file     Food insecurity     Worry: Not on file     Inability: Not on file     Transportation needs     Medical: Not on file     Non-medical: Not on file   Tobacco Use     Smoking status: Current Every Day Smoker     Packs/day: 0.45     Types: Cigarettes     Smokeless tobacco: Never Used     Tobacco comment: 8 cigs per day   Substance and Sexual Activity     Alcohol use: Yes     Alcohol/week: 0.0 standard drinks     Comment: 0-1x month     Drug use: Yes     Frequency: 7.0 times per week     Types: Marijuana     Comment: 3-4x daily for pain     Sexual activity: Not Currently     Partners: Female, Male     Comment: Liza hardy Jan 2019   Lifestyle     Physical activity     Days per week: Not on file     Minutes per session: Not on file     Stress: Not on file   Relationships     Social connections     Talks on phone: Not on file     Gets together: Not on file     Attends [employer]  service: Not on file     Active member of club or organization: Not on file     Attends meetings of clubs or organizations: Not on file     Relationship status: Not on file     Intimate partner violence     Fear of current or ex partner: Not on file     Emotionally abused: Not on file     Physically abused: Not on file     Forced sexual activity: Not on file   Other Topics Concern     Parent/sibling w/ CABG, MI or angioplasty before 65F 55M? Not Asked   Social History Narrative     Not on file         FAMILY HISTORY:  Family History   Problem Relation Age of Onset     Diabetes Mother      Hypertension Mother      Skin Cancer Mother      Diabetes Father      Endometriosis Sister          PHYSICAL EXAM:  ECO  GENERAL/CONSTITUTIONAL: No acute distress. Healthy, alert.  RESPIRATORY: No audible wheeze, cough, or visible cyanosis.  No visible retractions or increased work of breathing.  Able to speak fully in complete sentences.  NEUROLOGIC: Alert, oriented, answers questions appropriately. No tremor. Mentation intact and speech normal.  PSYCHIATRIC:  Mentation appears normal, affect normal/bright, judgement and insight intact, normal speech and appearance well-groomed.    The rest of a comprehensive physical exam is deferred due to public health emergency video visit restrictions.        LABS:  CBC RESULTS:   Recent Labs   Lab Test 20  1616   WBC 11.4*   RBC 5.26*   HGB 16.0*   HCT 45.9   MCV 87   MCH 30.4   MCHC 34.9   RDW 12.2        Component      Latest Ref Rng & Units 2020   Iron      35 - 180 ug/dL 138   Iron Binding Cap      240 - 430 ug/dL 333   Iron Saturation Index      15 - 46 % 41   Ferritin      12 - 150 ng/mL 38         Peripheral smear 20:  FINAL DIAGNOSIS:   Peripheral blood.   - Hematocrit slightly elevated.  Morphologically nonspecific.     COMMENT:   Current hemoglobin is within normal limits.  The hematocrit is slightly   elevated.  Potential causes of the   slight elevated  hematocrit include but are not limited too, various   cardiopulmonary diseases, smoking, sleep   apnea, elevated erythropoietin, spurious, dehydration, and less likely   early chronic myeloproliferative   neoplasms etc.  Clinical correlation is required.       ASSESSMENT/PLAN:  Kailey Argueta is a 21 year old adult with:    Elevated hemoglobin: We discussed possible causes, including smoking.  I advised complete smoking cessation.  Lab evaluation was essentially unremarkable, including negative myeloproliferative panel.  Of note, CT abdomen/pelvis on 6/11/20 showed no hepatosplenomegaly.    Last CBC was done in December 2020.  Hemoglobin remains slightly elevated at 16.    Occasional mildly elevated hemoglobin is likely due to smoking.  She also has asthma.  I again advised smoking cessation.      She has a PCP now.  She will follow-up with PCP with annual CBC.  She can follow-up with hematology as needed.      Jenny Rodriguez MD  Hematology/Oncology  [facility]      Total time spent on day of visit, including review of tests, obtaining/reviewing separately obtained history, ordering medications/tests/procedures, communicating with PCP/consultants, and documenting in electronic medical record: 10 minutes

## 2021-04-07 NOTE — LETTER
4/7/2021         RE: Kailey Argueta  [id number]  [place] [id number]        Dear Colleague,    Thank you for referring your patient, Kailey Argueta, to the [facility]. Please see a copy of my visit note below.    Annalisa is a 21 year old who is being evaluated via a billable video visit.      How would you like to obtain your AVS? MyChart  If the video visit is dropped, the invitation should be resent by: Text to cell phone: [phone]  Will anyone else be joining your video visit? No     Video Start Time:  2:32 pm    Video-Visit Details    Type of service:  Video Visit    Video End Time: 2:35 pm    Originating Location (pt. Location): Home    Distant Location (provider location):  [facility]     Platform used for Video Visit: Rip Guzman CMA      [facility]    Hematology/Oncology Established Patient Note      Today's Date: 4/07/21    Reason for Follow-up: Elevated hemoglobin      HISTORY OF PRESENT ILLNESS: Kailey Argueta is a 21 year old adult with PMHx of HTN, bipolar 1 disorder, anxiety, ADHD, who presents with elevated hemoglobin.   Annalisa says that she feels tired.  Annalisa does smoke about 3-4 cigarettes a day for the past couple of years.  Annalisa denies having sleep apnea, but notes having family history of it.    On chart review, labs show a mildly elevated hemoglobin in the 15-16 range since September 2019.  In May 2019, CBC was completely normal.        INTERIM HISTORY: Annalisa is doing well.  She has established care with a PCP now.        REVIEW OF SYSTEMS:   14 point ROS was reviewed and is negative other than as noted above in HPI.       HOME MEDICATIONS:  Current Outpatient Medications   Medication Sig Dispense Refill     acetaminophen (TYLENOL) 500 MG tablet Take 500-1,000 mg by mouth every 6 hours as needed for mild pain       albuterol (PROAIR RESPICLICK) 108 (90 Base) MCG/ACT inhaler Inhale 1-2  puffs into the lungs every 4 hours as needed for shortness of breath / dyspnea or wheezing 1 each 1     fluticasone-salmeterol (ADVAIR) 500-50 MCG/DOSE inhaler Inhale 1 puff into the lungs every 12 hours 60 each 5     gabapentin (NEURONTIN) 300 MG capsule Take 300 mg by mouth 2 times daily       hydrOXYzine (VISTARIL) 50 MG capsule Take 50 mg by mouth       omeprazole (PRILOSEC) 20 MG DR capsule Take 1 capsule (20 mg) by mouth daily 90 capsule 1         ALLERGIES:  Allergies   Allergen Reactions     Kiwi Other (See Comments)     Mild throat swelling per pt; able to have artificially sweetened kiwi flavor         PAST MEDICAL HISTORY:  Past Medical History:   Diagnosis Date     ADHD (attention deficit hyperactivity disorder)      Anxiety      Bipolar 1 disorder (H)      Headache      Hypertension      IUD contraception 01/2019    post ab         PAST SURGICAL HISTORY:  Past Surgical History:   Procedure Laterality Date     AS INDUCED ABORTN BY D&C  01/2019    16 weeks. PP         SOCIAL HISTORY:  Social History     Socioeconomic History     Marital status: Single     Spouse name: Not on file     Number of children: Not on file     Years of education: Not on file     Highest education level: Not on file   Occupational History     Not on file   Social Needs     Financial resource strain: Not on file     Food insecurity     Worry: Not on file     Inability: Not on file     Transportation needs     Medical: Not on file     Non-medical: Not on file   Tobacco Use     Smoking status: Current Every Day Smoker     Packs/day: 0.45     Types: Cigarettes     Smokeless tobacco: Never Used     Tobacco comment: 8 cigs per day   Substance and Sexual Activity     Alcohol use: Yes     Alcohol/week: 0.0 standard drinks     Comment: 0-1x month     Drug use: Yes     Frequency: 7.0 times per week     Types: Marijuana     Comment: 3-4x daily for pain     Sexual activity: Not Currently     Partners: Female, Male     Comment: [facility]  2019   Lifestyle     Physical activity     Days per week: Not on file     Minutes per session: Not on file     Stress: Not on file   Relationships     Social connections     Talks on phone: Not on file     Gets together: Not on file     Attends Shinto service: Not on file     Active member of club or organization: Not on file     Attends meetings of clubs or organizations: Not on file     Relationship status: Not on file     Intimate partner violence     Fear of current or ex partner: Not on file     Emotionally abused: Not on file     Physically abused: Not on file     Forced sexual activity: Not on file   Other Topics Concern     Parent/sibling w/ CABG, MI or angioplasty before 65F 55M? Not Asked   Social History Narrative     Not on file         FAMILY HISTORY:  Family History   Problem Relation Age of Onset     Diabetes Mother      Hypertension Mother      Skin Cancer Mother      Diabetes Father      Endometriosis Sister          PHYSICAL EXAM:  ECO  GENERAL/CONSTITUTIONAL: No acute distress. Healthy, alert.  RESPIRATORY: No audible wheeze, cough, or visible cyanosis.  No visible retractions or increased work of breathing.  Able to speak fully in complete sentences.  NEUROLOGIC: Alert, oriented, answers questions appropriately. No tremor. Mentation intact and speech normal.  PSYCHIATRIC:  Mentation appears normal, affect normal/bright, judgement and insight intact, normal speech and appearance well-groomed.    The rest of a comprehensive physical exam is deferred due to public health emergency video visit restrictions.        LABS:  CBC RESULTS:   Recent Labs   Lab Test 20  1616   WBC 11.4*   RBC 5.26*   HGB 16.0*   HCT 45.9   MCV 87   MCH 30.4   MCHC 34.9   RDW 12.2        Component      Latest Ref Rng & Units 2020   Iron      35 - 180 ug/dL 138   Iron Binding Cap      240 - 430 ug/dL 333   Iron Saturation Index      15 - 46 % 41   Ferritin      12 - 150 ng/mL 38         Peripheral  smear 9/22/20:  FINAL DIAGNOSIS:   Peripheral blood.   - Hematocrit slightly elevated.  Morphologically nonspecific.     COMMENT:   Current hemoglobin is within normal limits.  The hematocrit is slightly   elevated.  Potential causes of the   slight elevated hematocrit include but are not limited too, various   cardiopulmonary diseases, smoking, sleep   apnea, elevated erythropoietin, spurious, dehydration, and less likely   early chronic myeloproliferative   neoplasms etc.  Clinical correlation is required.       ASSESSMENT/PLAN:  Kailey Argueta is a 21 year old adult with:    Elevated hemoglobin: We discussed possible causes, including smoking.  I advised complete smoking cessation.  Lab evaluation was essentially unremarkable, including negative myeloproliferative panel.  Of note, CT abdomen/pelvis on 6/11/20 showed no hepatosplenomegaly.    Last CBC was done in December 2020.  Hemoglobin remains slightly elevated at 16.    Occasional mildly elevated hemoglobin is likely due to smoking.  She also has asthma.  I again advised smoking cessation.      She has a PCP now.  She will follow-up with PCP with annual CBC.  She can follow-up with hematology as needed.      Jenny Rodriguez MD  Hematology/Oncology  [facility]      Total time spent on day of visit, including review of tests, obtaining/reviewing separately obtained history, ordering medications/tests/procedures, communicating with PCP/consultants, and documenting in electronic medical record: 10 minutes            Again, thank you for allowing me to participate in the care of your patient.        Sincerely,        Jenny Rodriguez MD

## 2021-04-27 ENCOUNTER — MYC MEDICAL ADVICE (OUTPATIENT)
Dept: FAMILY MEDICINE | Facility: CLINIC | Age: 21
End: 2021-04-27

## 2021-04-28 ASSESSMENT — ASTHMA QUESTIONNAIRES: ACT_TOTALSCORE: 20

## 2021-05-05 ENCOUNTER — IMMUNIZATION (OUTPATIENT)
Dept: LAB | Facility: CLINIC | Age: 21
End: 2021-05-05
Payer: COMMERCIAL

## 2021-05-10 ENCOUNTER — OFFICE VISIT (OUTPATIENT)
Dept: FAMILY MEDICINE | Facility: CLINIC | Age: 21
End: 2021-05-10
Payer: COMMERCIAL

## 2021-05-10 VITALS
WEIGHT: 184 LBS | OXYGEN SATURATION: 97 % | HEART RATE: 98 BPM | TEMPERATURE: 98.1 F | DIASTOLIC BLOOD PRESSURE: 88 MMHG | SYSTOLIC BLOOD PRESSURE: 138 MMHG | RESPIRATION RATE: 16 BRPM | BODY MASS INDEX: 28.82 KG/M2

## 2021-05-10 DIAGNOSIS — F39 MOOD DISORDER (H): ICD-10-CM

## 2021-05-10 DIAGNOSIS — J45.40 MODERATE PERSISTENT ASTHMA WITHOUT COMPLICATION: Primary | ICD-10-CM

## 2021-05-10 DIAGNOSIS — H92.01 RIGHT EAR PAIN: ICD-10-CM

## 2021-05-10 DIAGNOSIS — F12.20 CANNABIS DEPENDENCE (H): ICD-10-CM

## 2021-05-10 PROCEDURE — 99214 OFFICE O/P EST MOD 30 MIN: CPT | Performed by: FAMILY MEDICINE

## 2021-05-10 RX ORDER — ETONOGESTREL AND ETHINYL ESTRADIOL VAGINAL RING .015; .12 MG/D; MG/D
RING VAGINAL
COMMUNITY
Start: 2021-04-22 | End: 2022-03-11

## 2021-05-10 ASSESSMENT — ANXIETY QUESTIONNAIRES
GAD7 TOTAL SCORE: 19
1. FEELING NERVOUS, ANXIOUS, OR ON EDGE: NEARLY EVERY DAY
7. FEELING AFRAID AS IF SOMETHING AWFUL MIGHT HAPPEN: NEARLY EVERY DAY
6. BECOMING EASILY ANNOYED OR IRRITABLE: NEARLY EVERY DAY
5. BEING SO RESTLESS THAT IT IS HARD TO SIT STILL: NEARLY EVERY DAY
3. WORRYING TOO MUCH ABOUT DIFFERENT THINGS: NEARLY EVERY DAY
IF YOU CHECKED OFF ANY PROBLEMS ON THIS QUESTIONNAIRE, HOW DIFFICULT HAVE THESE PROBLEMS MADE IT FOR YOU TO DO YOUR WORK, TAKE CARE OF THINGS AT HOME, OR GET ALONG WITH OTHER PEOPLE: EXTREMELY DIFFICULT
2. NOT BEING ABLE TO STOP OR CONTROL WORRYING: NEARLY EVERY DAY

## 2021-05-10 ASSESSMENT — PATIENT HEALTH QUESTIONNAIRE - PHQ9
SUM OF ALL RESPONSES TO PHQ QUESTIONS 1-9: 18
5. POOR APPETITE OR OVEREATING: SEVERAL DAYS

## 2021-05-10 NOTE — PROGRESS NOTES
"Assessment & Plan     Moderate persistent asthma without complication  Reviewed ACT with patient.  Recommended she discuss her anxiety and depression with her Psychiatrist, as it does not seem that her SOB is due to her asthma.  Follow up in one month or sooner as needed.    Mood disorder (H)  Discussed meeting with her psychiatrist to adjust her medications, as she remains actively anxious with depression symptoms.    Right ear pain  Normal Exam, follow up as needed.    Cannabis dependence (H)  Reviewed cannabis use, particularly by inhalation, can compromise her breathing and worsen her asthma.  Also that this can worsen her anxiety.  Recommended that she look into a different form of cannabis is she continues to use, as this may be problematic for her.      15 minutes spent on the date of the encounter doing chart review, history and exam, documentation and further activities per the note     BMI:   Estimated body mass index is 28.82 kg/m  as calculated from the following:    Height as of 3/2/21: 1.702 m (5' 7\").    Weight as of this encounter: 83.5 kg (184 lb).       Depression Screening Follow Up    PHQ 5/10/2021   PHQ-9 Total Score 18   Q9: Thoughts of better off dead/self-harm past 2 weeks Not at all   F/U: Thoughts of suicide or self-harm -   F/U: Self harm-plan -   F/U: Self-harm action -   F/U: Safety concerns -       Follow Up Actions Taken  Crisis resource information provided in After Visit Summary  Referred patient back to current mental health provider.     See Patient Instructions    Return in about 6 months (around 11/10/2021) for Asthma Recheck.    Zohra Garcia MD  [facility]     Kailey Argueta is a 21 year old adult who presents to clinic today for the following health issues accompanied by Annalisa Argueta's :    History of Present Illness     Asthma:  Annalisa SHEEHAN Kendall presents for follow up of asthma.  Annalisa Argueta has no cough, some wheezing, and some " "shortness of breath. Annalisa Argueta is using a relief medication daily. Annalisa Argueta typically misses taking Annalisa Argueta's controller medication 2 time(s) per week.Patient is aware of the following triggers: emotions. The patient has not had a visit to the Emergency Room, Urgent Care or Hospital due to asthma since the last clinic visit.       Overall no change in her breathing, has lots of psychosocial stressors including work that seem to exacerbate her breathing.  Struggling with her boss at work, etc.  Unclear if breathing is really asthma related or anxiety related.    Of note, patient uses Marijuana daily for pain and \"mental effects\".  Used gabapentin and hydroxyzine as needed.  Has a psychiatrist with [facility], has an appointment tomorrow. Psychiatry is aware of her cannabis use per patient.    Concern - Ear problem  Onset: 2 days, right only, left is fine  Description: sensitive to the touch, almost feels like there is something in the ear, pressure in the ear  Intensity: mild  Progression of Symptoms:  same  Accompanying Signs & Symptoms: no fevers  Previous history of similar problem: when little nothing recent    PHQ 12/16/2020 3/2/2021 5/10/2021   PHQ-9 Total Score 25 10 18   Q9: Thoughts of better off dead/self-harm past 2 weeks Nearly every day Not at all Not at all   F/U: Thoughts of suicide or self-harm Yes - -   F/U: Self harm-plan No - -   F/U: Self-harm action No - -   F/U: Safety concerns No - -     JOE-7 SCORE 12/16/2020 3/2/2021 5/10/2021   Total Score 20 (severe anxiety) 9 (mild anxiety) -   Total Score 20 9 19         Current Outpatient Medications   Medication     acetaminophen (TYLENOL) 500 MG tablet     albuterol (PROAIR RESPICLICK) 108 (90 Base) MCG/ACT inhaler     etonogestrel-ethinyl estradiol (NUVARING) 0.12-0.015 MG/24HR vaginal ring     fluticasone-salmeterol (ADVAIR) 500-50 MCG/DOSE inhaler     gabapentin (NEURONTIN) 300 MG capsule     hydrOXYzine (VISTARIL) 50 MG capsule     " omeprazole (PRILOSEC) 20 MG DR capsule     No current facility-administered medications for this visit.          Review of Systems   Constitutional, HEENT, cardiovascular, pulmonary, gi and gu systems are negative, except as otherwise noted.      Objective    /88 (BP Location: Right arm, Patient Position: Chair, Cuff Size: Adult Regular)   Pulse 98   Temp 98.1  F (36.7  C) (Oral)   Resp 16   Wt 83.5 kg (184 lb)   SpO2 97%   BMI 28.82 kg/m    Body mass index is 28.82 kg/m .  Physical Exam   GENERAL: healthy, alert and no distress  HEENT:  Bilateral TMs nonerythematous, canals clear.  Nasal mucosa pink and moist, no discharge,  Oropharynx clear  RESP: lungs clear to auscultation - no rales, rhonchi or wheezes  CV: regular rate and rhythm, normal S1 S2, no S3 or S4, no murmur, click or rub, no peripheral edema and peripheral pulses strong  MS: no gross musculoskeletal defects noted, no edema  SKIN: no suspicious lesions or rashes  PSYCH: mentation appears normal and affect flat

## 2021-05-11 ASSESSMENT — ANXIETY QUESTIONNAIRES: GAD7 TOTAL SCORE: 19

## 2021-07-13 ENCOUNTER — ANCILLARY PROCEDURE (OUTPATIENT)
Dept: GENERAL RADIOLOGY | Facility: CLINIC | Age: 21
End: 2021-07-13
Attending: FAMILY MEDICINE
Payer: COMMERCIAL

## 2021-07-13 ENCOUNTER — OFFICE VISIT (OUTPATIENT)
Dept: FAMILY MEDICINE | Facility: CLINIC | Age: 21
End: 2021-07-13
Payer: COMMERCIAL

## 2021-07-13 VITALS
SYSTOLIC BLOOD PRESSURE: 128 MMHG | WEIGHT: 188.4 LBS | OXYGEN SATURATION: 96 % | DIASTOLIC BLOOD PRESSURE: 76 MMHG | BODY MASS INDEX: 29.57 KG/M2 | HEART RATE: 83 BPM | TEMPERATURE: 99.6 F | HEIGHT: 67 IN

## 2021-07-13 DIAGNOSIS — M25.50 MULTIPLE JOINT PAIN: Primary | ICD-10-CM

## 2021-07-13 DIAGNOSIS — G89.29 CHRONIC RIGHT HIP PAIN: ICD-10-CM

## 2021-07-13 DIAGNOSIS — M54.42 CHRONIC BILATERAL LOW BACK PAIN WITH BILATERAL SCIATICA: ICD-10-CM

## 2021-07-13 DIAGNOSIS — G89.29 CHRONIC BILATERAL LOW BACK PAIN WITH BILATERAL SCIATICA: ICD-10-CM

## 2021-07-13 DIAGNOSIS — M54.41 CHRONIC BILATERAL LOW BACK PAIN WITH BILATERAL SCIATICA: ICD-10-CM

## 2021-07-13 DIAGNOSIS — M25.551 CHRONIC RIGHT HIP PAIN: ICD-10-CM

## 2021-07-13 LAB — ERYTHROCYTE [SEDIMENTATION RATE] IN BLOOD BY WESTERGREN METHOD: 5 MM/HR (ref 0–20)

## 2021-07-13 PROCEDURE — 73523 X-RAY EXAM HIPS BI 5/> VIEWS: CPT | Performed by: RADIOLOGY

## 2021-07-13 PROCEDURE — 85652 RBC SED RATE AUTOMATED: CPT | Performed by: FAMILY MEDICINE

## 2021-07-13 PROCEDURE — 86200 CCP ANTIBODY: CPT | Performed by: FAMILY MEDICINE

## 2021-07-13 PROCEDURE — 86140 C-REACTIVE PROTEIN: CPT | Performed by: FAMILY MEDICINE

## 2021-07-13 PROCEDURE — 86038 ANTINUCLEAR ANTIBODIES: CPT | Performed by: FAMILY MEDICINE

## 2021-07-13 PROCEDURE — 99214 OFFICE O/P EST MOD 30 MIN: CPT | Performed by: FAMILY MEDICINE

## 2021-07-13 PROCEDURE — 36415 COLL VENOUS BLD VENIPUNCTURE: CPT | Performed by: FAMILY MEDICINE

## 2021-07-13 PROCEDURE — 72100 X-RAY EXAM L-S SPINE 2/3 VWS: CPT | Performed by: RADIOLOGY

## 2021-07-13 PROCEDURE — 86431 RHEUMATOID FACTOR QUANT: CPT | Performed by: FAMILY MEDICINE

## 2021-07-13 ASSESSMENT — ENCOUNTER SYMPTOMS: BACK PAIN: 1

## 2021-07-13 ASSESSMENT — MIFFLIN-ST. JEOR: SCORE: 1642.21

## 2021-07-13 NOTE — PATIENT INSTRUCTIONS
1.  Topical analgesics: BenGay, Biofreeze, Aspercreme, IcyHot, Voltaren Gel    2.  Epsom salt soaks    3.  TENS Unit    4.  Salonpas patches (Lidocaine patches)    5.  Acetaminophen, 1000 mg by mouth every 6 hours as needed (Maximum 4000 mg per day)          OR        Ibuprofen, 800 mg by mouth every 8 hours as needed. (Maximum 2400 mg per day)

## 2021-07-13 NOTE — PROGRESS NOTES
"    Assessment & Plan     Multiple joint pain  Given history, will check labs.  Diclofenac PRN, follow up pending results.  - ESR: Erythrocyte sedimentation rate  - CRP, inflammation  - Rheumatoid factor  - Anti Nuclear Aditi IgG by IFA with Reflex  - Cyclic Citrullinated Peptide Antibody IgG  - diclofenac (VOLTAREN) 1 % topical gel; Apply 4 g topically 4 times daily as needed for moderate pain    Chronic right hip pain  Will order imaging and place PT referral.  Topical diclofenac gel, additional conservative cares in AVS.  Follow up as needed after PT.  - [facility]; Future  - XR Pelvis and Hip Bilateral 2 Views  - diclofenac (VOLTAREN) 1 % topical gel; Apply 4 g topically 4 times daily as needed for moderate pain    Chronic bilateral low back pain with bilateral sciatica  As above  - XR Lumbar Spine 2/3 Views  - [facility]; Future  - diclofenac (VOLTAREN) 1 % topical gel; Apply 4 g topically 4 times daily as needed for moderate pain    28 minutes spent on the date of the encounter doing chart review, history and exam, documentation and further activities per the note     Tobacco Cessation:   reports that Annalisa Argueta has been smoking cigarettes. Annalisa Argueta has been smoking about 0.45 packs per day. Annalisa Argueta has never used smokeless tobacco.      BMI:   Estimated body mass index is 29.51 kg/m  as calculated from the following:    Height as of this encounter: 1.702 m (5' 7\").    Weight as of this encounter: 85.5 kg (188 lb 6.4 oz).       See Patient Instructions    Return in about 3 months (around 10/13/2021), or if symptoms worsen or fail to improve, for Preventative Care Visit.    Zohra Garcia MD  [facility] YASMIN Fang is a 21 year old who presents for the following health issues     Back Pain     Arthritis         Back Pain  Onset/Duration: May 2021 after coughing spell  Description:   Location of pain: low back bilateral, middle of " "back bilateral, upper back bilateral and shoulders bilateral  Character of pain: dull ache  Pain radiation: radiates into the right buttocks, radiates into the right leg, radiates into the right foot, radiates into the left buttocks, radiates into the left leg and radiates into the left foot  New numbness or weakness in legs, not attributed to pain: YES- numbness  Intensity: Currently 7/10, At its worst 10/10, moderate, severe  Progression of Symptoms: intermittent and waxing and waning  History:   Specific cause: coughing  Pain interferes with job: YES  History of back problems: no prior back problems  Any previous MRI or X-rays: None  Sees a specialist for back pain: No  Alleviating factors:   Improved by: heat and massage    Precipitating factors:  Worsened by: Standing, Sitting, Lying Flat, Coughing and changing positions when lying down  Therapies tried and outcome: muscle relaxants    Has been having back pains, lower back.  sometimes shoulders.  Does not feel like it is mormal, unsure what could be causing it.  Reports that she took a hit off of her Vape and started coughing severely.  Says she felt a pop in her back, had trouble moving, felt like screaming in pain.  That incident lasted three days.  Wore a back brace daily afterwards, feels like it is coming back now.  Says the pop was in the middle of her low back, says it initially felt like a \"normal\" pop of her back, then got progressively worse. Could not move the entire low back.  Was seen at [facility], was given muscle relaxer, thinks it was cyclobenzaprine.  Says it took the edge off, and was more manageable.    Thinks her leg pain is separate, right hip.  Unsure what happened to it.  Did have imaging of her back a while back with chiropractics, believes she was told that her hips were shifted somewhat.    Accompanying Signs & Symptoms:  Risk of Fracture: None  Risk of Cauda Equina: None  Risk of Infection: None  Risk of Cancer: None  Risk of Ankylosing " "Spondylitis: Onset at age <35, male, AND morning back stiffness  no         Musculoskeletal problem/pain > Joint Pain  Onset/Duration: 2019  Description  Location: fingers, hand, wrist and elbow - bilateral  Joint Swelling: no  Redness: no  Pain: YES  Warmth: YES  Intensity:  moderate  Progression of Symptoms:  worsening  Accompanying signs and symptoms:   Fevers: no  Numbness/tingling/weakness: no  History  Trauma to the area: no  Recent illness:  no  Previous similar problem: no  Previous evaluation:  no  Precipitating or alleviating factors:  Aggravating factors include: knitting, typing  Therapies tried and outcome: nothing and Ibuprofen    Concerned about how stiff her joints are feeling.  Does not feel that it is normal for her hands to get very sore after knitting for 5 minutes.  Cannot go for long before she cannot continue with her projects.  Trouble when she is braiding her hair, seems like her elbows become stiff and sore.  If arms are kept in certain positions for too long she will have pain.  Knees are starting to get sore, jokes about how she will have pain before it rains, but says that it genuinely happens.  Reports that she hardly exercises, says her joints should not hurt this much.  Uncle has RA.  Thinks Grandmother has OA, mom has some type of arthritis, unsure what kind, thinks she takes some medications, uses a cream for her knee.  Hand pain primarily in PIP joints.    Review of Systems   Musculoskeletal: Positive for arthritis and back pain.   Remainder of 7 point ROS negative except per HPI         Objective    /76 (BP Location: Right arm, Patient Position: Sitting, Cuff Size: Adult Regular)   Pulse 83   Temp 99.6  F (37.6  C) (Oral)   Ht 1.702 m (5' 7\")   Wt 85.5 kg (188 lb 6.4 oz)   SpO2 96%   BMI 29.51 kg/m    Body mass index is 29.51 kg/m .  Physical Exam   GENERAL: healthy, alert and no distress  MS: Bilateral hands have slight tenderness noted with palpation of PIP joints.  " No erythema, edema, deformity, or warmth to hands.  Full ROM.  Comprehensive back pain exam:  Tenderness of spine starting at upper lumbar/lower thoracic spine down to sacrum, into bilateral buttocks and across bilateral lumbosacral paraspinous musculature and Range of motion not limited by pain.  Full forward flexion at waist, able to touch ground.  Reports some discomfort and tightness afterwards at rest.  No hip tenderness bilaterally.

## 2021-07-14 LAB — CRP SERPL-MCNC: <2.9 MG/L (ref 0–8)

## 2021-07-15 LAB
ANA PAT SER IF-IMP: NORMAL
ANA SER QL IF: NEGATIVE
ANA TITR SER IF: NORMAL {TITER}
CCP AB SER IA-ACNC: 0.9 U/ML

## 2021-07-20 LAB — RHEUMATOID FACT SER NEPH-ACNC: <7 IU/ML

## 2021-07-22 ENCOUNTER — THERAPY VISIT (OUTPATIENT)
Dept: PHYSICAL THERAPY | Facility: CLINIC | Age: 21
End: 2021-07-22
Attending: FAMILY MEDICINE
Payer: COMMERCIAL

## 2021-07-22 DIAGNOSIS — G89.29 CHRONIC RIGHT HIP PAIN: ICD-10-CM

## 2021-07-22 DIAGNOSIS — M54.42 CHRONIC BILATERAL LOW BACK PAIN WITH BILATERAL SCIATICA: ICD-10-CM

## 2021-07-22 DIAGNOSIS — G89.29 CHRONIC BILATERAL LOW BACK PAIN WITH BILATERAL SCIATICA: ICD-10-CM

## 2021-07-22 DIAGNOSIS — M25.551 CHRONIC RIGHT HIP PAIN: ICD-10-CM

## 2021-07-22 DIAGNOSIS — M54.41 CHRONIC BILATERAL LOW BACK PAIN WITH BILATERAL SCIATICA: ICD-10-CM

## 2021-07-22 PROCEDURE — 97161 PT EVAL LOW COMPLEX 20 MIN: CPT | Mod: GP | Performed by: PHYSICAL THERAPIST

## 2021-07-22 PROCEDURE — 97110 THERAPEUTIC EXERCISES: CPT | Mod: GP | Performed by: PHYSICAL THERAPIST

## 2021-07-22 ASSESSMENT — ACTIVITIES OF DAILY LIVING (ADL)
HOS_ADL_SCORE(%): 75
HEAVY_WORK: MODERATE DIFFICULTY
GETTING_INTO_AND_OUT_OF_AN_AVERAGE_CAR: SLIGHT DIFFICULTY
GETTING_INTO_AND_OUT_OF_A_BATHTUB: NO DIFFICULTY AT ALL
SITTING_FOR_15_MINUTES: SLIGHT DIFFICULTY
RECREATIONAL_ACTIVITIES: NO DIFFICULTY AT ALL
HOS_ADL_COUNT: 17
WALKING_UP_STEEP_HILLS: MODERATE DIFFICULTY
GOING_UP_1_FLIGHT_OF_STAIRS: MODERATE DIFFICULTY
LIGHT_TO_MODERATE_WORK: SLIGHT DIFFICULTY
PUTTING_ON_SOCKS_AND_SHOES: NO DIFFICULTY AT ALL
WALKING_15_MINUTES_OR_GREATER: MODERATE DIFFICULTY
STANDING_FOR_15_MINUTES: MODERATE DIFFICULTY
WALKING_DOWN_STEEP_HILLS: SLIGHT DIFFICULTY
ROLLING_OVER_IN_BED: NO DIFFICULTY AT ALL
HOW_WOULD_YOU_RATE_YOUR_CURRENT_LEVEL_OF_FUNCTION_DURING_YOUR_USUAL_ACTIVITIES_OF_DAILY_LIVING_FROM_0_TO_100_WITH_100_BEING_YOUR_LEVEL_OF_FUNCTION_PRIOR_TO_YOUR_HIP_PROBLEM_AND_0_BEING_THE_INABILITY_TO_PERFORM_ANY_OF_YOUR_USUAL_DAILY_ACTIVITIES?: 75
WALKING_INITIALLY: NO DIFFICULTY AT ALL
STEPPING_UP_AND_DOWN_CURBS: NO DIFFICULTY AT ALL
GOING_DOWN_1_FLIGHT_OF_STAIRS: SLIGHT DIFFICULTY
WALKING_APPROXIMATELY_10_MINUTES: SLIGHT DIFFICULTY
HOS_ADL_ITEM_SCORE_TOTAL: 51
TWISTING/PIVOTING_ON_INVOLVED_LEG: SLIGHT DIFFICULTY
DEEP_SQUATTING: SLIGHT DIFFICULTY
HOS_ADL_HIGHEST_POTENTIAL_SCORE: 68

## 2021-07-22 NOTE — LETTER
DEPARTMENT OF HEALTH AND HUMAN SERVICES  CENTERS FOR MEDICARE & MEDICAID SERVICES    PLAN/UPDATED PLAN OF PROGRESS FOR OUTPATIENT REHABILITATION          PATIENTS NAME:  Kailey Argueta   : 2000  PROVIDER NUMBER: [id number]  [id number]   PROVIDER NAME: [facility] [place]  MEDICAL RECORD NUMBER: [id number]   START OF CARE DATE: 21   TYPE:  PT  PRIMARY/TREATMENT DIAGNOSIS: Chronic right hip pain; Chronic bilateral low back pain with bilateral sciatica  VISITS FROM START OF CARE:  Rxs Used: 1     Physical Therapy Initial Evaluation  Subjective:  The history is provided by the patient.   Patient Health History  Kailey Argueta being seen for R hip, low back pain.   Problem began: 2020.   Problem occurred: unknown, but overall with daily activities - recently pain is more constant   Pain is reported as 7/10 on pain scale.  General health as reported by patient is fair.  Pertinent medical history includes: asthma, depression, high blood pressure, migraines/headaches, mental illness and smoking.   Red flags:  Severe headaches and unexplained weight loss (over 4 months lost weight from #230 - now #180 - pt will discuss with MD).  Current medications: see EPIC.    Current occupation is [employer] .   Primary job tasks include:  Lifting/carrying, prolonged standing and repetitive tasks.               Therapist Generated HPI Evaluation  Type of problem:  Lumbar.  This is a chronic condition.  Condition occurred with:  Insidious onset.  Where condition occurred: for unknown reasons.  Patient reports pain:  Lumbar spine right, lumbar spine left, lower lumbar spine and mid lumbar spine.  Pain is described as aching and is intermittent.  Pain radiates to:  Gluteals right. Pain is worse during the day.  Since onset symptoms are unchanged.  Associated symptoms:  Loss of motion/stiffness. Symptoms are exacerbated by lifting, carrying, certain positions and walking  and relieved  by analgesics and rest.  Special tests included:  X-ray.  Work activity restrictions: starts work on Tuesday.  Barriers include:  None as reported by patient.                  Objective:  Standing Alignment:    Cervical/Thoracic:  Thoracic kyphosis increased and forward head  Shoulder/UE:  Rounded shoulders      PATIENTS NAME:  Kailey Argueta   : 2000          Lumbar/SI Evaluation  ROM:  Arom wnl lumbar: prone lying good tolerance, SINA x 2 sets during ok, after improved standing lumbar extension ROM.  AROM Lumbar:   Flexion:          WNL near palms flat  Ext:                    Mod -max loss with tighntess   Side Bend:        Left:  WNL    Right:  WNL  Rotation:           Left:     Right:   Side Glide:        Left:     Right:   Lumbar Myotomes:  normal  Neural Tension/Mobility:  Lumbar:  Normal         Hip Evaluation  Hip PROM:  Hip PROM:  Left Hip:    Normal  Right Hip:  Normal  Pain: little pain/pressure with R hip Flexion, IR  Hip Strength:    Abduction:  Right: 4+/5    Pain:  Hip Special Testing:    Left hip negative for the following special tests:  Piriformis; Fadir/Labrum; SLR or Marcellus  Right hip positive for the following special tests:  Fadir/LabrumRight hip negative for the following special tests:  Piriformis; SLR or Marcellus        Assessment/Plan:    Patient is a 21 year old adult with lumbar and right side hip complaints.    Patient has the following significant findings with corresponding treatment plan.                Diagnosis 1:  Lumbar pain with limited extension ROM, R hip pain  Pain -  hot/cold therapy, manual therapy, splint/taping/bracing/orthotics, self management and education  Decreased ROM/flexibility - manual therapy and therapeutic exercise  Decreased strength - therapeutic exercise and therapeutic activities  Decreased function - therapeutic activities  Impaired posture - neuro re-education    Previous and current functional limitations:  (See Goal Flow Sheet for this information)   "  Short term and Long term goals: (See Goal Flow Sheet for this information)     Communication ability:  Patient appears to be able to clearly communicate and understand verbal and written communication and follow directions correctly.  Treatment Explanation - The following has been discussed with the patient:   RX ordered/plan of care  Anticipated outcomes  Possible risks and side effects  This patient would benefit from PT intervention to resume normal activities.   Rehab potential is good.                PATIENTS NAME:  Kailey Argueta   : 2000              Frequency:  1 X week, once daily  Duration:  for 6 weeks  Discharge Plan:  Achieve all LTG.  Independent in home treatment program.  Reach maximal therapeutic benefit.      Caregiver Signature/Credentials _____________________________ Date ________       Treating Provider: Onel Velasquez, PT    I have reviewed and certified the need for these services and plan of treatment while under my care.        PHYSICIAN'S SIGNATURE:   _________________________________________  Date___________   April Carol Garcia MD    Certification period:  Beginning of Cert date period: 21 to  End of Cert period date: 10/14/21     Functional Level Progress Report: Please see attached \"Goal Flow sheet for Functional level.\"    ____X____ Continue Services or       ________ DC Services                Service dates: From  SOC Date: 21 date to present                         "

## 2021-07-22 NOTE — PROGRESS NOTES
Physical Therapy Initial Evaluation  Subjective:  The history is provided by the patient.   Patient Health History  Kailey Argueta being seen for R hip, low back pain.     Problem began: 7/22/2020.   Problem occurred: unknown, but overall with daily activities - recently pain is more constant   Pain is reported as 7/10 on pain scale.  General health as reported by patient is fair.  Pertinent medical history includes: asthma, depression, high blood pressure, migraines/headaches, mental illness and smoking.   Red flags:  Severe headaches and unexplained weight loss (over 4 months lost weight from #230 - now #180 - pt will discuss with MD).         Current medications: see EPIC.    Current occupation is [employer] .   Primary job tasks include:  Lifting/carrying, prolonged standing and repetitive tasks.                  Therapist Generated HPI Evaluation         Type of problem:  Lumbar.    This is a chronic condition.  Condition occurred with:  Insidious onset.  Where condition occurred: for unknown reasons.  Patient reports pain:  Lumbar spine right, lumbar spine left, lower lumbar spine and mid lumbar spine.  Pain is described as aching and is intermittent.  Pain radiates to:  Gluteals right. Pain is worse during the day.  Since onset symptoms are unchanged.  Associated symptoms:  Loss of motion/stiffness. Symptoms are exacerbated by lifting, carrying, certain positions and walking  and relieved by analgesics and rest.  Special tests included:  X-ray.    Work activity restrictions: starts work on Tuesday.  Barriers include:  None as reported by patient.                        Objective:  Standing Alignment:    Cervical/Thoracic:  Thoracic kyphosis increased and forward head  Shoulder/UE:  Rounded shoulders                             Lumbar/SI Evaluation  ROM:  Arom wnl lumbar: prone lying good tolerance, SINA x 2 sets during ok, after improved standing lumbar extension ROM.  AROM Lumbar:   Flexion:          WNL near  palms flat  Ext:                    Mod -max loss with tighntess   Side Bend:        Left:  WNL    Right:  WNL  Rotation:           Left:     Right:   Side Glide:        Left:     Right:           Lumbar Myotomes:  normal                  Neural Tension/Mobility:  Lumbar:  Normal                                                  Hip Evaluation  Hip PROM:  Hip PROM:  Left Hip:    Normal  Right Hip:  Normal                  Pain: little pain/pressure with R hip Flexion, IR        Hip Strength:        Abduction:  Right: 4+/5    Pain:                  Hip Special Testing:      Left hip negative for the following special tests:  Piriformis; Fadir/Labrum; SLR or Marcellus   Right hip positive for the following special tests:  Fadir/LabrumRight hip negative for the following special tests:  Piriformis; SLR or Marcellus                 General     ROS    Assessment/Plan:    Patient is a 21 year old adult with lumbar and right side hip complaints.    Patient has the following significant findings with corresponding treatment plan.                Diagnosis 1:  Lumbar pain with limited extension ROM, R hip pain  Pain -  hot/cold therapy, manual therapy, splint/taping/bracing/orthotics, self management and education  Decreased ROM/flexibility - manual therapy and therapeutic exercise  Decreased strength - therapeutic exercise and therapeutic activities  Decreased function - therapeutic activities  Impaired posture - neuro re-education    Previous and current functional limitations:  (See Goal Flow Sheet for this information)    Short term and Long term goals: (See Goal Flow Sheet for this information)     Communication ability:  Patient appears to be able to clearly communicate and understand verbal and written communication and follow directions correctly.  Treatment Explanation - The following has been discussed with the patient:   RX ordered/plan of care  Anticipated outcomes  Possible risks and side effects  This patient would  benefit from PT intervention to resume normal activities.   Rehab potential is good.    Frequency:  1 X week, once daily  Duration:  for 6 weeks  Discharge Plan:  Achieve all LTG.  Independent in home treatment program.  Reach maximal therapeutic benefit.    Please refer to the daily flowsheet for treatment today, total treatment time and time spent performing 1:1 timed codes.

## 2021-08-02 ENCOUNTER — E-VISIT (OUTPATIENT)
Dept: FAMILY MEDICINE | Facility: CLINIC | Age: 21
End: 2021-08-02

## 2021-08-02 DIAGNOSIS — M25.572 BILATERAL ANKLE PAIN, UNSPECIFIED CHRONICITY: Primary | ICD-10-CM

## 2021-08-02 DIAGNOSIS — M25.571 BILATERAL ANKLE PAIN, UNSPECIFIED CHRONICITY: Primary | ICD-10-CM

## 2021-08-02 DIAGNOSIS — M25.50 MULTIPLE JOINT PAIN: ICD-10-CM

## 2021-08-02 PROCEDURE — 99421 OL DIG E/M SVC 5-10 MIN: CPT | Performed by: FAMILY MEDICINE

## 2021-08-02 RX ORDER — NAPROXEN 500 MG/1
500 TABLET ORAL 2 TIMES DAILY WITH MEALS
Qty: 30 TABLET | Refills: 0 | Status: SHIPPED | OUTPATIENT
Start: 2021-08-02 | End: 2022-03-23

## 2021-08-02 NOTE — TELEPHONE ENCOUNTER
Provider E-Visit time total (minutes): 10 minutes    Patient was seen recently for multiple joint pain.  ESR, CRP, and RF were all normal.  Will order naproxen today for pain, referral to rheumatology.  Follow up if not improving before appointment with rheumatology.

## 2021-08-02 NOTE — PATIENT INSTRUCTIONS
Thank you for choosing us for your care. I have placed an order for a prescription so that you can start treatment. View your full visit summary for details by clicking on the link below. Your pharmacist will able to address any questions you may have about the medication.     If you're not feeling better within 5-7 days, please schedule an appointment.  You can schedule an appointment right here in [facility], or call [phone]  If the visit is for the same symptoms as your eVisit, we'll refund the cost of your eVisit if seen within seven days.

## 2021-08-09 ENCOUNTER — THERAPY VISIT (OUTPATIENT)
Dept: PHYSICAL THERAPY | Facility: CLINIC | Age: 21
End: 2021-08-09
Payer: COMMERCIAL

## 2021-08-09 DIAGNOSIS — G89.29 CHRONIC RIGHT HIP PAIN: ICD-10-CM

## 2021-08-09 DIAGNOSIS — G89.29 CHRONIC BILATERAL LOW BACK PAIN WITH BILATERAL SCIATICA: ICD-10-CM

## 2021-08-09 DIAGNOSIS — M54.41 CHRONIC BILATERAL LOW BACK PAIN WITH BILATERAL SCIATICA: ICD-10-CM

## 2021-08-09 DIAGNOSIS — M25.551 CHRONIC RIGHT HIP PAIN: ICD-10-CM

## 2021-08-09 DIAGNOSIS — M54.42 CHRONIC BILATERAL LOW BACK PAIN WITH BILATERAL SCIATICA: ICD-10-CM

## 2021-08-09 PROCEDURE — 97110 THERAPEUTIC EXERCISES: CPT | Mod: GP | Performed by: PHYSICAL THERAPIST

## 2021-08-09 PROCEDURE — 97112 NEUROMUSCULAR REEDUCATION: CPT | Mod: GP | Performed by: PHYSICAL THERAPIST

## 2021-09-25 ENCOUNTER — HEALTH MAINTENANCE LETTER (OUTPATIENT)
Age: 21
End: 2021-09-25

## 2021-10-16 ENCOUNTER — NURSE TRIAGE (OUTPATIENT)
Dept: NURSING | Facility: CLINIC | Age: 21
End: 2021-10-16

## 2021-10-16 NOTE — TELEPHONE ENCOUNTER
Annalisa had a diagnosis ADHD from [facility] in 2003.    She recently found the paperwork that documents this.  She wants to know if having this documentation would assist in her receiving medication treatment    Advised that prescribing would be at the discretion of a provider.  Since the diagnosis was when she was 3 years old, a provider might want another evaluation of her symptoms as an adult.    She will look into scheduling an appointment via [employer].  She will also reach out to her Psychology provider.    COVID 19 Nurse Triage Plan/Patient Instructions    Please be aware that novel coronavirus (COVID-19) may be circulating in the community. If you develop symptoms such as fever, cough, or SOB or if you have concerns about the presence of another infection including coronavirus (COVID-19), please contact your health care provider or visit [email or web address].     Disposition/Instructions    Virtual Visit with provider recommended. Reference Visit Selection Guide.  In-Person Visit with provider recommended. Reference Visit Selection Guide.    Thank you for taking steps to prevent the spread of this virus.  o Limit your contact with others.  o Wear a simple mask to cover your cough.  o Wash your hands well and often.    Resources    [facility]: About COVID-19: [email or web address]    CDC: What to Do If You're Sick: www.cdc.gov/coronavirus/2019-ncov/about/steps-when-sick.html    CDC: Ending Home Isolation: www.cdc.gov/coronavirus/2019-ncov/hcp/disposition-in-home-patients.html     CDC: Caring for Someone: www.cdc.gov/coronavirus/2019-ncov/if-you-are-sick/care-for-someone.html     [facility]: Interim Guidance for Hospital Discharge to Home: www.health.state.[place]/diseases/coronavirus/hcp/hospdischarge.pdf    [facility] clinical trials (COVID-19 research studies): [email or web address]/umn-[email or web address]-trials     Below are the COVID-19 hotlines at the [facility]  [facility] ([facility]). Interpreters are available.   o For health questions: Call 725-415-4960 or 1-918.329.2033 (7 a.m. to 7 p.m.)  o For questions about schools and childcare: Call 416-905-1517 or 1-558.209.1276 (7 a.m. to 7 p.m.)     Mavis Chase RN  [facility]          Reason for Disposition    [1] Caller requesting a NON-URGENT new prescription or refill AND [2] triager unable to refill per unit policy    Protocols used: MEDICATION QUESTION CALL-A-

## 2021-10-17 ENCOUNTER — NURSE TRIAGE (OUTPATIENT)
Dept: NURSING | Facility: CLINIC | Age: 21
End: 2021-10-17

## 2021-10-17 NOTE — TELEPHONE ENCOUNTER
"Triage Call:    Patient is calling and she landed on the side of her foot and had all of her weight on it and rolled it.  This happened yesterday.   She can walk on it, but it is \"very tender and painful\".   It is on her left foot.   There is just a little bit of bruising below the pinky toe.  The area is red and radiating heat, a little swelling.     Rates pain right now 6/10 just sitting, walking on it is about 7-8/10.          Pt was advised of protocol recommendation/disposition of be seen in the next 24 hours.     Zoila Pompa RN on 10/17/2021 at 9:35 AM        COVID 19 Nurse Triage Plan/Patient Instructions    Please be aware that novel coronavirus (COVID-19) may be circulating in the community. If you develop symptoms such as fever, cough, or SOB or if you have concerns about the presence of another infection including coronavirus (COVID-19), please contact your health care provider or visit [email or web address].     Disposition/Instructions    In-Person Visit with provider recommended. Reference Visit Selection Guide.    Thank you for taking steps to prevent the spread of this virus.  o Limit your contact with others.  o Wear a simple mask to cover your cough.  o Wash your hands well and often.    Resources    [facility]: About COVID-19: [email or web address]    CDC: What to Do If You're Sick: www.cdc.gov/coronavirus/2019-ncov/about/steps-when-sick.html    CDC: Ending Home Isolation: www.cdc.gov/coronavirus/2019-ncov/hcp/disposition-in-home-patients.html     CDC: Caring for Someone: www.cdc.gov/coronavirus/2019-ncov/if-you-are-sick/care-for-someone.html     [facility]: Interim Guidance for Hospital Discharge to Home: www.health.[facility].[place]/diseases/coronavirus/hcp/hospdischarge.pdf    [facility] clinical trials (COVID-19 research studies): [email or web address].[facility]/umn-clinical-trials     Below are the COVID-19 hotlines at the [place] [facility] ([facility]). Interpreters are " available.   o For health questions: Call 891-224-4684 or 1-565.776.1488 (7 a.m. to 7 p.m.)  o For questions about schools and childcare: Call 787-088-1537 or 1-890.651.6337 (7 a.m. to 7 p.m.)                   Additional Information    Negative: Serious injury with multiple fractures    Negative: [1] Major bleeding (e.g., actively dripping or spurting) AND [2] can't be stopped    Negative: Amputation    Negative: Looks like a dislocated joint (very crooked or deformed)    Negative: Sounds like a life-threatening emergency to the triager    Negative: Wound looks infected    Negative: Caused by an animal bite    Negative: Caused by a human bite    Negative: Puncture wound of foot    Negative: Toe injury is main concern    Negative: Cast problems or questions    Negative: Bullet wound, stabbed by knife, or other serious penetrating wound    Negative: Skin is split open or gaping (or length > 1/2 inch or 12 mm)    Negative: [1] Bleeding AND [2] won't stop after 10 minutes of direct pressure (using correct technique)    Negative: [1] Dirt in the wound AND [2] not removed with 15 minutes of scrubbing    Negative: Can't stand (bear weight) or walk    Negative: [1] Numbness (new loss of sensation) of toe(s) AND [2] present now    Negative: Sounds like a serious injury to the triager    Negative: [1] SEVERE pain AND [2] not improved 2 hours after pain medicine/ice packs    Negative: Suspicious history for the injury    [1] Limp when walking AND [2] due to a twisted ankle or foot    Protocols used: FOOT AND ANKLE INJURY-A-

## 2021-11-09 PROBLEM — G89.29 CHRONIC RIGHT HIP PAIN: Status: RESOLVED | Noted: 2020-12-16 | Resolved: 2021-11-09

## 2021-11-09 PROBLEM — M25.551 CHRONIC RIGHT HIP PAIN: Status: RESOLVED | Noted: 2020-12-16 | Resolved: 2021-11-09

## 2021-11-09 PROBLEM — M54.41 CHRONIC BILATERAL LOW BACK PAIN WITH BILATERAL SCIATICA: Status: RESOLVED | Noted: 2021-07-22 | Resolved: 2021-11-09

## 2021-11-09 PROBLEM — G89.29 CHRONIC BILATERAL LOW BACK PAIN WITH BILATERAL SCIATICA: Status: RESOLVED | Noted: 2021-07-22 | Resolved: 2021-11-09

## 2021-11-09 PROBLEM — M54.42 CHRONIC BILATERAL LOW BACK PAIN WITH BILATERAL SCIATICA: Status: RESOLVED | Noted: 2021-07-22 | Resolved: 2021-11-09

## 2021-11-09 NOTE — PROGRESS NOTES
Discharge Note    Progress reporting period is from last progress note on   to Aug 9, 2021.    Kailey failed to follow up and current status is unknown.  Please see information below for last relevant information on current status.  Patient seen for 2 visits.    SUBJECTIVE  Subjective changes noted by patient:  R ankle weakness/pain with any standing longer than 4 hours.  using ankle brace.  Back is feeling better, no issues with back pain.  working at laundry services.  Changes in function:  Yes (See Goal flowsheet attached for changes in current functional level)  Adverse reaction to treatment or activity: None    OBJECTIVE  Changes noted in objective findings: lumbar AROM WNL, slight pain with R SB.  ANkle MMT Eversion 4/5 Bilat, Inv 4+/5, DF 4+/5 B     ASSESSMENT/PLAN  Diagnosis: Lumbar, R hip pain   Updated problem list and treatment plan:   Pain - HEP  Decreased ROM/flexibility - HEP  STG/LTGs have been met or progress has been made towards goals:  Yes, please see goal flowsheet for most current information  Assessment of Progress: current status is unknown.    Last current status: Pt is progressing as expected   Self Management Plans:  HEP  I have re-evaluated this patient and find that the nature, scope, duration and intensity of the therapy is appropriate for the medical condition of the patient.  Kailey continues to require the following intervention to meet STG and LTG's:  HEP.    Recommendations:  Discharge with current home program.  Patient to follow up with MD as needed.    Please refer to the daily flowsheet for treatment today, total treatment time and time spent performing 1:1 timed codes.

## 2021-11-16 ENCOUNTER — OFFICE VISIT (OUTPATIENT)
Dept: RHEUMATOLOGY | Facility: CLINIC | Age: 21
End: 2021-11-16
Payer: COMMERCIAL

## 2021-11-16 VITALS
SYSTOLIC BLOOD PRESSURE: 110 MMHG | HEART RATE: 78 BPM | WEIGHT: 180.7 LBS | DIASTOLIC BLOOD PRESSURE: 60 MMHG | BODY MASS INDEX: 28.3 KG/M2

## 2021-11-16 DIAGNOSIS — M25.50 MULTIPLE JOINT PAIN: ICD-10-CM

## 2021-11-16 DIAGNOSIS — M25.572 BILATERAL ANKLE PAIN, UNSPECIFIED CHRONICITY: ICD-10-CM

## 2021-11-16 DIAGNOSIS — M35.7 HYPERMOBILITY SYNDROME: Primary | ICD-10-CM

## 2021-11-16 DIAGNOSIS — M25.571 BILATERAL ANKLE PAIN, UNSPECIFIED CHRONICITY: ICD-10-CM

## 2021-11-16 PROCEDURE — 99204 OFFICE O/P NEW MOD 45 MIN: CPT | Performed by: INTERNAL MEDICINE

## 2021-11-16 NOTE — PROGRESS NOTES
This document was created using a software with less than 100% fidelity, at times resulting in unintended, even erroneous syntax and grammar.  The reader is advised to keep this under consideration while reviewing, interpreting this note.      Rheumatology Consult Note      Kailey Argueta     YOB: 2000 Age: 21 year old    Date of visit: 11/16/21    PCP: Zohra Beasley    Chief Complaint   Patient presents with:  Consult: joint pain      Assessment and Plan     Kailey was seen today for consult.    Diagnoses and all orders for this visit:    Hypermobility syndrome    Bilateral ankle pain, unspecified chronicity  -     Rheumatology Referral    Multiple joint pain  -     Rheumatology Referral         This patient with widespread arthralgias predominantly in her shoulders hips ankles has ample evidence of hypermobility likely the cause of her arthralgias, the likelihood of her having a connective tissue disease or inflammatory joint disease appears to be remote I have reassured her.  Strengthening of the core muscles, done under supervision of trained professional was recommended.  She is to follow-up with her primary physician.       HPI   Kailey Argueta is a 21 year old adult  is seen today for evaluation of arthralgias, she dates it to her childhood, she noted that the worst of the pains are in her shoulders hips and ankles especially in the hip and ankle area, now that she is working full-time 40 hours a week of being on her feet causes the pain to get worse, he has not observed swelling.  She has not experienced pain in her feet, in her hands.  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  Once that happens it can be painful for several hours thereafter.  She finds Voltaren gel is of help.  She reports no personal or family history that she is aware of psoriasis ulcerative colitis or Crohn's disease.  Her work-up at her primary physician's office includes a  rheumatoid factor anti-CCP antibody and CHERRI which were all normal.  Her acute phase response including sedimentation rate, CRP were also normal.  She has taken over-the-counter nonsteroidals with some help.  She is is a smoker, she works at a  [employer], she noted overall pain level to be 7.0/10 morning stiffness is minimal at 10 minutes.  She has noted fatigue generalized weakness, she has noted headaches easy bruising muscle spasms.  She has difficult time sleeping.  She has not never been pregnant, no history of DVT PE, no history of seizure disorder that she is aware of.           Active Problem List     Patient Active Problem List   Diagnosis     CARDIOVASCULAR SCREENING; LDL GOAL LESS THAN 130     Panic attack     Migraine without status migrainosus, not intractable, unspecified migraine type     Mood disorder (H)     Gastroesophageal reflux disease with esophagitis     Abdominal pain, chronic, bilateral lower quadrant     Adjustment disorder with depressed mood     Borderline personality disorder (H)     Cannabis abuse     History of homicidal ideation     Posttraumatic stress disorder     Slow transit constipation     Suicidal thoughts     Moderate persistent asthma without complication     Dysfunctional uterine bleeding     Tobacco use disorder     Elevated blood pressure reading without diagnosis of hypertension     Cannabis dependence (H)     Past Medical History     Past Medical History:   Diagnosis Date     ADHD      ADHD (attention deficit hyperactivity disorder)      Anxiety      Anxiety      Asthma     believes she has asthma     Bipolar 1 disorder (H)      Bipolar 1 disorder (H)      Headache      Hypertension      Hypertension      IUD contraception 01/2019    post ab     Past Surgical History     Past Surgical History:   Procedure Laterality Date     AS INDUCED ABORTN BY D&C  01/2019    16 weeks. PP     DILATION AND CURETTAGE  01/2019     Allergy     Allergies   Allergen Reactions     Kiwi  Other (See Comments)     Mild throat swelling per pt; able to have artificially sweetened kiwi flavor     Current Medication List     Current Outpatient Medications   Medication Sig     acetaminophen (TYLENOL) 500 MG tablet Take 500-1,000 mg by mouth every 6 hours as needed for mild pain     albuterol (PROAIR RESPICLICK) 108 (90 Base) MCG/ACT inhaler Inhale 1-2 puffs into the lungs every 4 hours as needed for shortness of breath / dyspnea or wheezing     diclofenac (VOLTAREN) 1 % topical gel Apply 4 g topically 4 times daily as needed for moderate pain     fluticasone-salmeterol (ADVAIR) 500-50 MCG/DOSE inhaler Inhale 1 puff into the lungs every 12 hours     gabapentin (NEURONTIN) 300 MG capsule Take 300 mg by mouth 2 times daily     hydrOXYzine (VISTARIL) 50 MG capsule Take 50 mg by mouth     naproxen (NAPROSYN) 500 MG tablet Take 1 tablet (500 mg) by mouth 2 times daily (with meals) As needed for pain     omeprazole (PRILOSEC) 20 MG DR capsule Take 1 capsule (20 mg) by mouth daily     etonogestrel-ethinyl estradiol (NUVARING) 0.12-0.015 MG/24HR vaginal ring      No current facility-administered medications for this visit.            Family History     Family History   Problem Relation Age of Onset     Diabetes Mother      Hypertension Mother      Skin Cancer Mother      Diabetes Father      Endometriosis Sister          Physical Exam     COMPREHENSIVE EXAMINATION:  Vitals:    11/16/21 1048   BP: 110/60   Pulse: 78   Weight: 82 kg (180 lb 11.2 oz)     A well appearing alert oriented adult. Vital data as noted above. Annalisa Argueta's eyes examined for inflammation/scleromalacia. ENT examined for oral mucositis, moisture, thrush, nasal deformity, external ear redness, deformity. Annalisa Argueta's neck is examined for suppleness and lymphadenopathy.  Cardiopulmonary examination without dyspnea at rest, use of accessory muscles of breathing, wheezing, edema, peripheral or central cyanosis.  Abdomen examined for softness,  tenderness, obvious organomegaly.  Skin examined for heliotrope, malar area eruption, lupus pernio, periungual erythema, sclerodactyly, papules, erythema nodosum, purpura, nail pitting, onycholysis, and obvious psoriasis lesion. Neurological examination done for alertness, speech, facial symmetry,  tone and power in upper and lower extremities, and gait. The joint examination is performed for swelling, tenderness, warmth, erythema, and range of motion in the following joints: DIPs, PIPs, MCPs, wrists, first CMC's, elbows, shoulders, hips, knees, ankles, feet; spine for range of motion and paraspinal muscles for tenderness. The salient normal / abnormal findings are appended.  She has hypermobility, she is able to touch her thumbs to the distal forearm, her elbow joints show hyperextension, fifth digit shows hyperextension as well.  She does not have synovitis in any of the palpable joints of upper or lower extremities.  There is no dactylitis of digits or toes.  There is no enthesitis such as Achilles.  She has tenderness in the trochanteric area more so on the right side as well as in the lumbar spine and the paraspinal region.  She has several tattoos.    Labs / Imaging (select studies)     Rheumatoid Factor   Date Value Ref Range Status   07/13/2021 <7 <20 IU/mL Final      Hemoglobin   Date Value Ref Range Status   12/16/2020 16.0 (H) 11.7 - 15.7 g/dL Final     Comment:     Results confirmed by repeat test   09/22/2020 15.4 11.7 - 15.7 g/dL Final   06/22/2020 14.8 12.0 - 16.0 g/dL Final   06/11/2020 15.9 (H) 11.7 - 15.7 g/dL Final     Urea Nitrogen   Date Value Ref Range Status   12/16/2020 12 7 - 30 mg/dL Final   09/22/2020 7 7 - 30 mg/dL Final   06/22/2020 9 8 - 22 mg/dL Final   06/11/2020 10 7 - 30 mg/dL Final     Creatinine   Date Value Ref Range Status   05/12/2017 0.8 0.50 - 1.00 mg/dL Final     Erythrocyte Sedimentation Rate   Date Value Ref Range Status   07/13/2021 5 0 - 20 mm/hr Final     Comment:      Sex Specific Reference Ranges:     Female  0-20  mm/hr   Male      0-15  mm/hr       CRP Inflammation   Date Value Ref Range Status   07/13/2021 <2.9 0.0 - 8.0 mg/L Final     AST   Date Value Ref Range Status   12/16/2020 16 0 - 45 U/L Final   09/22/2020 13 0 - 45 U/L Final   06/22/2020 13 0 - 40 U/L Final   06/11/2020 13 0 - 45 U/L Final     Albumin   Date Value Ref Range Status   12/16/2020 4.0 3.4 - 5.0 g/dL Final   09/22/2020 3.9 3.4 - 5.0 g/dL Final   06/22/2020 3.6 3.5 - 5.0 g/dL Final   06/11/2020 4.1 3.4 - 5.0 g/dL Final     Alkaline Phosphatase   Date Value Ref Range Status   12/16/2020 78 40 - 150 U/L Final   09/22/2020 77 40 - 150 U/L Final   06/22/2020 66 45 - 120 U/L Final   06/11/2020 70 40 - 150 U/L Final     ALT   Date Value Ref Range Status   12/16/2020 27 0 - 50 U/L Final   09/22/2020 24 0 - 50 U/L Final   06/22/2020 16 0 - 45 U/L Final   06/11/2020 22 0 - 50 U/L Final     Rheumatoid Factor   Date Value Ref Range Status   07/13/2021 <7 <20 IU/mL Final          Immunization History     Immunization History   Administered Date(s) Administered     COVID-19,PF,Lillian 05/05/2021     HEPA 06/22/2011     HPV 06/22/2011, 11/17/2011     Influenza (IIV3) PF 12/26/2007, 02/07/2008, 11/19/2008, 01/12/2013     Influenza Intranasal Vaccine 4 valent (FluMist) 10/14/2013     Influenza Vaccine IM > 6 months Valent IIV4 (Alfuria,Fluzone) 10/15/2018, 03/02/2021     Influenza,INJ,MDCK,PF,Quad >4yrs 10/15/2018     MMR 06/29/2020     Meningococcal (Menomune ) 06/22/2011     TDAP Vaccine (Adacel) 06/29/2020     Tdap (Adacel,Boostrix) 03/02/2021     Varicella 02/19/2009

## 2022-01-12 ENCOUNTER — IMMUNIZATION (OUTPATIENT)
Dept: NURSING | Facility: CLINIC | Age: 22
End: 2022-01-12
Payer: COMMERCIAL

## 2022-01-12 PROCEDURE — 0064A COVID-19,PF,MODERNA (18+ YRS BOOSTER .25ML): CPT

## 2022-01-12 PROCEDURE — 91306 COVID-19,PF,MODERNA (18+ YRS BOOSTER .25ML): CPT

## 2022-01-15 ENCOUNTER — HEALTH MAINTENANCE LETTER (OUTPATIENT)
Age: 22
End: 2022-01-15

## 2022-01-27 ENCOUNTER — TELEPHONE (OUTPATIENT)
Dept: FAMILY MEDICINE | Facility: CLINIC | Age: 22
End: 2022-01-27
Payer: COMMERCIAL

## 2022-01-27 NOTE — TELEPHONE ENCOUNTER
Reason for Call:  Other appointment    Detailed comments: f/u rhuematologist work recommendation for school     Phone Number Patient can be reached at: Home number on file [phone] (home)    Best Time: any    Can we leave a detailed message on this number? YES    Call taken on 1/27/2022 at 11:58 AM by Bettie Golden

## 2022-01-28 NOTE — TELEPHONE ENCOUNTER
Pt scheduled visit for 2/2/22 with provider   Joana Edmondson/EMT-B  [facility] / [place]

## 2022-02-01 ENCOUNTER — NURSE TRIAGE (OUTPATIENT)
Dept: FAMILY MEDICINE | Facility: CLINIC | Age: 22
End: 2022-02-01
Payer: COMMERCIAL

## 2022-02-01 NOTE — TELEPHONE ENCOUNTER
"S-(situation): Patient called with concerns of new, unplanned 17lb weight loss over the last month.     B-(background): Patient states that she took her weight today after noticing physically that she has lost weight. When patient weighed herself today, she weighs 17lbs less than when she did at the beginning of January 2022.     A-(assessment): Patient denies changes in food, including type and amount. Patient denies a decrease or increase in appetite. Patient does state that she feels more dehydrated, but denies increased/excessive thirst. Patient states that she is drinking the same amount of fluids. Patient denies excessive urination.   Patient states the last time this happened she was pregnant; denies this being the case this time, just had her period. Patient denies having sexual intercourse recently.   Patient denies new stress in her life. Patient states life has become less stressful in relation to renewal of ADHD diagnosis and new diagnosis of Autism.   Patient denies chest pain, shortness of breath, and dizziness.     Patient has appointment with PCP tomorrow for ongoing right hip pain. Patient states that this has been occurring for over a year. Patient states that her leg is \"constantly doing the splits on the right side, constantly sore like over used.\" Patient denies that sitting/standing helped. Patient states that over the last 30 days it has become more of an issue and is starting to interfere with her job.     R-(recommendations): NO protocol for sudden weight loss. Routing to provider, please advise. Okay to discuss this at visit tomorrow along with hip pain? Make another appointment?    RANDY HansenN, RN  [facility]          "

## 2022-02-02 ENCOUNTER — OFFICE VISIT (OUTPATIENT)
Dept: FAMILY MEDICINE | Facility: CLINIC | Age: 22
End: 2022-02-02
Payer: COMMERCIAL

## 2022-02-02 VITALS
DIASTOLIC BLOOD PRESSURE: 81 MMHG | HEIGHT: 67 IN | TEMPERATURE: 98.4 F | BODY MASS INDEX: 25.74 KG/M2 | WEIGHT: 164 LBS | OXYGEN SATURATION: 98 % | HEART RATE: 87 BPM | SYSTOLIC BLOOD PRESSURE: 124 MMHG

## 2022-02-02 DIAGNOSIS — M25.551 HIP PAIN, RIGHT: Primary | ICD-10-CM

## 2022-02-02 DIAGNOSIS — R06.2 WHEEZING: ICD-10-CM

## 2022-02-02 PROCEDURE — 99213 OFFICE O/P EST LOW 20 MIN: CPT | Mod: 25 | Performed by: PHYSICIAN ASSISTANT

## 2022-02-02 PROCEDURE — 90686 IIV4 VACC NO PRSV 0.5 ML IM: CPT | Performed by: PHYSICIAN ASSISTANT

## 2022-02-02 PROCEDURE — 90715 TDAP VACCINE 7 YRS/> IM: CPT | Performed by: PHYSICIAN ASSISTANT

## 2022-02-02 PROCEDURE — 90472 IMMUNIZATION ADMIN EACH ADD: CPT | Performed by: PHYSICIAN ASSISTANT

## 2022-02-02 PROCEDURE — 90471 IMMUNIZATION ADMIN: CPT | Performed by: PHYSICIAN ASSISTANT

## 2022-02-02 RX ORDER — ALBUTEROL SULFATE 90 UG/1
1-2 POWDER, METERED RESPIRATORY (INHALATION) EVERY 4 HOURS PRN
Qty: 1 EACH | Refills: 1 | Status: SHIPPED | OUTPATIENT
Start: 2022-02-02 | End: 2022-02-07

## 2022-02-02 RX ORDER — MELOXICAM 7.5 MG/1
7.5 TABLET ORAL DAILY
Qty: 90 TABLET | Refills: 0 | Status: SHIPPED | OUTPATIENT
Start: 2022-02-02 | End: 2022-03-23

## 2022-02-02 ASSESSMENT — MIFFLIN-ST. JEOR: SCORE: 1523.59

## 2022-02-02 ASSESSMENT — PATIENT HEALTH QUESTIONNAIRE - PHQ9
SUM OF ALL RESPONSES TO PHQ QUESTIONS 1-9: 10
10. IF YOU CHECKED OFF ANY PROBLEMS, HOW DIFFICULT HAVE THESE PROBLEMS MADE IT FOR YOU TO DO YOUR WORK, TAKE CARE OF THINGS AT HOME, OR GET ALONG WITH OTHER PEOPLE: SOMEWHAT DIFFICULT
SUM OF ALL RESPONSES TO PHQ QUESTIONS 1-9: 10

## 2022-02-02 ASSESSMENT — ANXIETY QUESTIONNAIRES
2. NOT BEING ABLE TO STOP OR CONTROL WORRYING: MORE THAN HALF THE DAYS
5. BEING SO RESTLESS THAT IT IS HARD TO SIT STILL: SEVERAL DAYS
1. FEELING NERVOUS, ANXIOUS, OR ON EDGE: SEVERAL DAYS
6. BECOMING EASILY ANNOYED OR IRRITABLE: NOT AT ALL
3. WORRYING TOO MUCH ABOUT DIFFERENT THINGS: MORE THAN HALF THE DAYS
4. TROUBLE RELAXING: SEVERAL DAYS
GAD7 TOTAL SCORE: 7
GAD7 TOTAL SCORE: 7
7. FEELING AFRAID AS IF SOMETHING AWFUL MIGHT HAPPEN: NOT AT ALL
7. FEELING AFRAID AS IF SOMETHING AWFUL MIGHT HAPPEN: NOT AT ALL
GAD7 TOTAL SCORE: 7

## 2022-02-02 ASSESSMENT — ASTHMA QUESTIONNAIRES: ACT_TOTALSCORE: 17

## 2022-02-02 NOTE — LETTER
February 2, 2022      Kailey AGUILA Kendall  [id number] [address] [place] [address]        To Whom It May Concern:    Kailey Argueta  was seen on 2/2/2022.  Please allow Annalisa AGUILA Argueta to use a stool at work as needed due to chronic joint pains.       Sincerely,        Asad Mejia PA-C

## 2022-02-02 NOTE — PROGRESS NOTES
Assessment & Plan     Hip pain, right    Chronic. Has already seen rheumatology and undergone PT. Will try mobic. Gave letter allowing her to use a stool at work.    - meloxicam (MOBIC) 7.5 MG tablet; Take 1 tablet (7.5 mg) by mouth daily      Wheezing    Well controlled asthma. Continue to use albuterol as needed.    - albuterol (PROAIR RESPICLICK) 108 (90 Base) MCG/ACT inhaler; Inhale 1-2 puffs into the lungs every 4 hours as needed for shortness of breath / dyspnea or wheezing                 There are no Patient Instructions on file for this visit.    No follow-ups on file.    Asad Mejia PA-C  [facility] YASMIN Fang is a 21 year old who presents for the following health issues     History of Present Illness       Annalisa Argueta eats 0-1 servings of fruits and vegetables daily.Annalisa Argueta consumes 2 sweetened beverage(s) daily.Annalisa Argueta exercises with enough effort to increase Annalisa Argueta's heart rate 20 to 29 minutes per day.  Annalisa Argueta exercises with enough effort to increase Annalisa Argueta's heart rate 4 days per week. Annalisa Argueta is missing 1 dose(s) of medications per week.       Pain History:  When did you first notice your pain? - More than 6 weeks   Have you seen this provider for your pain in the past?   Yes   Where in your body do you have pain? Right hip  Are you seeing anyone else for your pain? Saw a rheumatologist about 3 months ago- hypermobility    PHQ-9 SCORE 3/2/2021 5/10/2021 2/2/2022   PHQ-9 Total Score MyChart 10 (Moderate depression) - 10 (Moderate depression)   PHQ-9 Total Score 10 18 10       JOE-7 SCORE 3/2/2021 5/10/2021 2/2/2022   Total Score 9 (mild anxiety) - 7 (mild anxiety)   Total Score 9 19 7       Chronic Pain Follow Up:    Location of pain: Right Hip  Analgesia/pain control:    - Recent changes:  Affecting ability to work.    - Overall control: tolerable    - Current treatments: Aleve & Advil when pain flares up- helps a  "little  Adherence:     - Do you ever take more pain medicine than prescribed? No    - When did you take your last dose of pain medicine?  Tylenol 2/1/22   Adverse effects: No       PDMP Review     None        Last CSA Agreement:   CSA -- Patient Level:    CSA: None found at the patient level.           Review of Systems   Constitutional, HEENT, cardiovascular, pulmonary, gi and gu systems are negative, except as otherwise noted.        Objective    /81 (BP Location: Right arm, Patient Position: Sitting, Cuff Size: Adult Regular)   Pulse 87   Temp 98.4  F (36.9  C) (Oral)   Ht 1.689 m (5' 6.5\")   Wt 74.4 kg (164 lb)   LMP 01/20/2022 (Approximate)   SpO2 98%   BMI 26.07 kg/m    Body mass index is 26.07 kg/m .       Physical Exam   GENERAL: healthy, alert and no distress  EYES: Eyes grossly normal to inspection, PERRL and conjunctivae and sclerae normal  MS: no gross musculoskeletal defects noted, no edema  SKIN: no suspicious lesions or rashes  NEURO: Normal strength and tone, mentation intact and speech normal  PSYCH: mentation appears normal, affect normal/bright  Right hip: Mildly tender to palpation on lateral hip and into groin area. ROM intact.                 Answers for HPI/ROS submitted by the patient on 2/2/2022  If you checked off any problems, how difficult have these problems made it for you to do your work, take care of things at home, or get along with other people?: Somewhat difficult  PHQ9 TOTAL SCORE: 10  JOE 7 TOTAL SCORE: 7      "

## 2022-02-03 ASSESSMENT — ANXIETY QUESTIONNAIRES: GAD7 TOTAL SCORE: 7

## 2022-02-03 ASSESSMENT — PATIENT HEALTH QUESTIONNAIRE - PHQ9: SUM OF ALL RESPONSES TO PHQ QUESTIONS 1-9: 10

## 2022-02-07 ENCOUNTER — TELEPHONE (OUTPATIENT)
Dept: FAMILY MEDICINE | Facility: CLINIC | Age: 22
End: 2022-02-07
Payer: COMMERCIAL

## 2022-02-07 DIAGNOSIS — R06.2 WHEEZING: ICD-10-CM

## 2022-02-07 RX ORDER — ALBUTEROL SULFATE 90 UG/1
1-2 AEROSOL, METERED RESPIRATORY (INHALATION) EVERY 4 HOURS PRN
Qty: 18 G | Refills: 1 | Status: SHIPPED | OUTPATIENT
Start: 2022-02-07 | End: 2022-10-06

## 2022-02-07 NOTE — TELEPHONE ENCOUNTER
Fax from pharmacy, proair respiclick NOT covered, covers albuterol sulfate HFA, routed to AB, if okay approve and close  Pia Santiago RN, BSN  [facility]

## 2022-03-08 ENCOUNTER — NURSE TRIAGE (OUTPATIENT)
Dept: FAMILY MEDICINE | Facility: CLINIC | Age: 22
End: 2022-03-08

## 2022-03-08 ENCOUNTER — HOSPITAL ENCOUNTER (EMERGENCY)
Facility: CLINIC | Age: 22
Discharge: HOME | End: 2022-03-08
Attending: EMERGENCY MEDICINE | Admitting: EMERGENCY MEDICINE
Payer: COMMERCIAL

## 2022-03-08 VITALS
RESPIRATION RATE: 19 BRPM | WEIGHT: 158.73 LBS | DIASTOLIC BLOOD PRESSURE: 85 MMHG | BODY MASS INDEX: 25.24 KG/M2 | OXYGEN SATURATION: 98 % | SYSTOLIC BLOOD PRESSURE: 104 MMHG | TEMPERATURE: 97.6 F | HEART RATE: 56 BPM

## 2022-03-08 DIAGNOSIS — R42 LIGHTHEADEDNESS: ICD-10-CM

## 2022-03-08 DIAGNOSIS — R00.2 PALPITATIONS: ICD-10-CM

## 2022-03-08 LAB
ALBUMIN UR-MCNC: NEGATIVE MG/DL
ANION GAP SERPL CALCULATED.3IONS-SCNC: 4 MMOL/L (ref 3–14)
APPEARANCE UR: CLEAR
BASOPHILS # BLD AUTO: 0 10E3/UL (ref 0–0.2)
BASOPHILS NFR BLD AUTO: 0 %
BILIRUB UR QL STRIP: NEGATIVE
BUN SERPL-MCNC: 13 MG/DL (ref 7–30)
CALCIUM SERPL-MCNC: 9.7 MG/DL (ref 8.5–10.1)
CHLORIDE BLD-SCNC: 104 MMOL/L (ref 94–109)
CO2 SERPL-SCNC: 28 MMOL/L (ref 20–32)
COLOR UR AUTO: ABNORMAL
CREAT SERPL-MCNC: 0.78 MG/DL (ref 0.52–1.25)
D DIMER PPP FEU-MCNC: <0.27 UG/ML FEU (ref 0–0.5)
EOSINOPHIL # BLD AUTO: 0.8 10E3/UL (ref 0–0.7)
EOSINOPHIL NFR BLD AUTO: 9 %
ERYTHROCYTE [DISTWIDTH] IN BLOOD BY AUTOMATED COUNT: 12 % (ref 10–15)
GFR SERPL CREATININE-BSD FRML MDRD: >90 ML/MIN/1.73M2
GLUCOSE BLD-MCNC: 102 MG/DL (ref 70–99)
GLUCOSE UR STRIP-MCNC: NEGATIVE MG/DL
HCT VFR BLD AUTO: 46.1 % (ref 35–53)
HGB BLD-MCNC: 15.5 G/DL (ref 11.7–17.7)
HGB UR QL STRIP: NEGATIVE
HOLD SPECIMEN: NORMAL
IMM GRANULOCYTES # BLD: 0 10E3/UL
IMM GRANULOCYTES NFR BLD: 0 %
KETONES UR STRIP-MCNC: 10 MG/DL
LEUKOCYTE ESTERASE UR QL STRIP: NEGATIVE
LYMPHOCYTES # BLD AUTO: 3.1 10E3/UL (ref 0.8–5.3)
LYMPHOCYTES NFR BLD AUTO: 34 %
MCH RBC QN AUTO: 29.7 PG (ref 26.5–33)
MCHC RBC AUTO-ENTMCNC: 33.6 G/DL (ref 31.5–36.5)
MCV RBC AUTO: 88 FL (ref 78–100)
MONOCYTES # BLD AUTO: 0.4 10E3/UL (ref 0–1.3)
MONOCYTES NFR BLD AUTO: 5 %
MUCOUS THREADS #/AREA URNS LPF: PRESENT /LPF
NEUTROPHILS # BLD AUTO: 4.7 10E3/UL (ref 1.6–8.3)
NEUTROPHILS NFR BLD AUTO: 52 %
NITRATE UR QL: NEGATIVE
NRBC # BLD AUTO: 0 10E3/UL
NRBC BLD AUTO-RTO: 0 /100
PH UR STRIP: 5 [PH] (ref 5–7)
PLATELET # BLD AUTO: 282 10E3/UL (ref 150–450)
POTASSIUM BLD-SCNC: 3.5 MMOL/L (ref 3.4–5.3)
RBC # BLD AUTO: 5.22 10E6/UL (ref 3.8–5.9)
RBC URINE: <1 /HPF
SODIUM SERPL-SCNC: 136 MMOL/L (ref 133–144)
SP GR UR STRIP: 1.02 (ref 1–1.03)
SQUAMOUS EPITHELIAL: <1 /HPF
TROPONIN I SERPL HS-MCNC: 3 NG/L
UROBILINOGEN UR STRIP-MCNC: NORMAL MG/DL
WBC # BLD AUTO: 9 10E3/UL (ref 4–11)
WBC URINE: 1 /HPF

## 2022-03-08 PROCEDURE — 85004 AUTOMATED DIFF WBC COUNT: CPT | Performed by: EMERGENCY MEDICINE

## 2022-03-08 PROCEDURE — 36415 COLL VENOUS BLD VENIPUNCTURE: CPT | Performed by: EMERGENCY MEDICINE

## 2022-03-08 PROCEDURE — 85379 FIBRIN DEGRADATION QUANT: CPT | Performed by: EMERGENCY MEDICINE

## 2022-03-08 PROCEDURE — 81001 URINALYSIS AUTO W/SCOPE: CPT | Performed by: EMERGENCY MEDICINE

## 2022-03-08 PROCEDURE — 93005 ELECTROCARDIOGRAM TRACING: CPT

## 2022-03-08 PROCEDURE — 84484 ASSAY OF TROPONIN QUANT: CPT | Performed by: EMERGENCY MEDICINE

## 2022-03-08 PROCEDURE — 99284 EMERGENCY DEPT VISIT MOD MDM: CPT

## 2022-03-08 PROCEDURE — 80048 BASIC METABOLIC PNL TOTAL CA: CPT | Performed by: EMERGENCY MEDICINE

## 2022-03-08 NOTE — TELEPHONE ENCOUNTER
"Patient calling stating, \"I'm having trouble catching my breath. I can hardly stand and have to lean against something. I feel very lightheaded\".  The patient is talking in a slow manner. Sounds uncomfortable on the phone. Advised patient she needs to call 911 right away.  Patient seemed reluctant to do this so I explained to her if she is short of breath, feels lightheaded and can barely stand, she needs emergency help.    Patient said she wants to be \"...guaranteed that the hospital won't just give me ibuprofen and send me home.  I've gone there before and they don't believe me\".  This RN informed patient that I cannot guarantee what the ER will do for their evaluation but her symptoms are like a car accident emergency and she needs to be evaluated right away. Patient stated she is currently at work and her boss won't believe her and her co-workers around her don't believe her either most of the time when she says she is sick. This RN offered TWICE to call 911 for her and have them arrive at her work and patient declined. I asked if she wanted me to talk to her boss and she again declined.  This RN tried several times explaining that her symptoms are emergent and we don't want her passing out before she can get help. Patient said \"thank you\" and hung up.    Routing to PCP as FYI.    Reason for Disposition    MODERATE difficulty breathing (e.g., speaks in phrases, SOB even at rest, pulse 100-120) of new onset or worse than normal    Additional Information    Negative: Breathing stopped and hasn't returned    Negative: Choking on something    Negative: SEVERE difficulty breathing (e.g., struggling for each breath, speaks in single words, pulse > 120)    Negative: Bluish (or gray) lips or face    Negative: Difficult to awaken or acting confused (e.g., disoriented, slurred speech)    Negative: Passed out (i.e., fainted, collapsed and was not responding)    Negative: Wheezing started suddenly after medicine, an " "allergic food, or bee sting    Negative: Stridor    Negative: Slow, shallow and weak breathing    Negative: Sounds like a life-threatening emergency to the triager    Negative: Chest pain    Negative: Wheezing (high pitched whistling sound) and previous asthma attacks or use of asthma medicines    Negative: Difficulty breathing and only present when coughing    Negative: Difficulty breathing and only from stuffy or runny nose    Negative: Difficulty breathing and within 14 days of COVID-19 Exposure    Answer Assessment - Initial Assessment Questions  1. RESPIRATORY STATUS: \"Describe your breathing?\" (e.g., wheezing, shortness of breath, unable to speak, severe coughing)       \"can't catch my breath, having a hard time breathing\"  2. ONSET: \"When did this breathing problem begin?\"       A few minutes ago  3. PATTERN \"Does the difficult breathing come and go, or has it been constant since it started?\"       Didn't ask  4. SEVERITY: \"How bad is your breathing?\" (e.g., mild, moderate, severe)     - MILD: No SOB at rest, mild SOB with walking, speaks normally in sentences, can lay down, no retractions, pulse < 100.     - MODERATE: SOB at rest, SOB with minimal exertion and prefers to sit, cannot lie down flat, speaks in phrases, mild retractions, audible wheezing, pulse 100-120.     - SEVERE: Very SOB at rest, speaks in single words, struggling to breathe, sitting hunched forward, retractions, pulse > 120       Hard to tell. Patient is talking in somewhat of a slow manner  5. RECURRENT SYMPTOM: \"Have you had difficulty breathing before?\" If so, ask: \"When was the last time?\" and \"What happened that time?\"       Didn't ask  6. CARDIAC HISTORY: \"Do you have any history of heart disease?\" (e.g., heart attack, angina, bypass surgery, angioplasty)       Didn't ask  7. LUNG HISTORY: \"Do you have any history of lung disease?\"  (e.g., pulmonary embolus, asthma, emphysema)      Didn't ask  8. CAUSE: \"What do you think is causing " "the breathing problem?\"       Didn't ask  9. OTHER SYMPTOMS: \"Do you have any other symptoms? (e.g., dizziness, runny nose, cough, chest pain, fever)      \"lightheaded, can't stand up so leaning against something\"  10. PREGNANCY: \"Is there any chance you are pregnant?\" \"When was your last menstrual period?\"        Didn't ask  11. TRAVEL: \"Have you traveled out of the country in the last month?\" (e.g., travel history, exposures)        Didn't ask    Protocols used: BREATHING DIFFICULTY-A-OH      Melinda Herrera BSN, RN    "

## 2022-03-08 NOTE — ED TRIAGE NOTES
Pt states heart palpitations while helping her mother move 1 week ago. Pt states she felt as though she would pass out x 5. Since then pt states when she exerts herself while wearing a mask she feels out of breath and uses her albuterol inhaler. Hx asthma. Pt states when bending over and standing up she feels light headed. Pt has not taken her inhaler today. ABC in tact ex tachycardia. A/OX4

## 2022-03-08 NOTE — ED PROVIDER NOTES
History     Chief Complaint:    Palpitations      HPI   Kailey Argueta is a 22 year old adult who presents with a history of asthma as well as anxiety reports that several days ago she was helping her mom move doing some physical labor she felt like she was having a panic attack.  She reported her heart was racing she felt short of breath and lightheaded.  She took hydroxyzine which did not help.  Since that time she has had episodes of a racing heart lightheadedness to the point today where she felt she was going to pass out she says normally it happens when she bends over she has had no vomiting diarrhea does not think she is pregnant no travel does smoke and says her symptoms do come and go.  She was sent to the ER today after calling the nurse line.    Review of Systems  10 point review of systems all negative except as in HPI    Allergies:    Kiwi      Medications:      acetaminophen (TYLENOL) 500 MG tablet  albuterol (PROAIR HFA/PROVENTIL HFA/VENTOLIN HFA) 108 (90 Base) MCG/ACT inhaler  diclofenac (VOLTAREN) 1 % topical gel  etonogestrel-ethinyl estradiol (NUVARING) 0.12-0.015 MG/24HR vaginal ring  fluticasone-salmeterol (ADVAIR) 500-50 MCG/DOSE inhaler  gabapentin (NEURONTIN) 300 MG capsule  hydrOXYzine (VISTARIL) 50 MG capsule  meloxicam (MOBIC) 7.5 MG tablet  naproxen (NAPROSYN) 500 MG tablet  omeprazole (PRILOSEC) 20 MG DR capsule        Past Medical History:      Past Medical History:   Diagnosis Date     ADHD      ADHD (attention deficit hyperactivity disorder)      Anxiety      Anxiety      Asthma      Bipolar 1 disorder (H)      Bipolar 1 disorder (H)      Headache      Hypertension      Hypertension      IUD contraception 01/2019     Patient Active Problem List    Diagnosis Date Noted     Cannabis dependence (H) 05/10/2021     Priority: Medium     Moderate persistent asthma without complication 12/16/2020     Priority: Medium     Dysfunctional uterine bleeding 12/16/2020     Priority: Medium      Tobacco use disorder 12/16/2020     Priority: Medium     Elevated blood pressure reading without diagnosis of hypertension 12/16/2020     Priority: Medium     Mood disorder (H) 06/29/2020     Priority: Medium     Gastroesophageal reflux disease with esophagitis 06/29/2020     Priority: Medium     Borderline personality disorder (H) 06/25/2020     Priority: Medium     Posttraumatic stress disorder 06/25/2020     Priority: Medium     Adjustment disorder with depressed mood 06/22/2020     Priority: Medium     Cannabis abuse 06/22/2020     Priority: Medium     History of homicidal ideation 06/22/2020     Priority: Medium     Suicidal thoughts 06/22/2020     Priority: Medium     Abdominal pain, chronic, bilateral lower quadrant 06/21/2020     Priority: Medium     Slow transit constipation 06/21/2020     Priority: Medium     Migraine without status migrainosus, not intractable, unspecified migraine type 05/15/2017     Priority: Medium     Panic attack 06/29/2016     Priority: Medium     CARDIOVASCULAR SCREENING; LDL GOAL LESS THAN 130 05/31/2016     Priority: Medium        Past Surgical History:      Past Surgical History:   Procedure Laterality Date     AS INDUCED ABORTN BY D&C  01/2019    16 weeks. PP     DILATION AND CURETTAGE  01/2019       Family History:      Family History   Problem Relation Age of Onset     Diabetes Mother      Hypertension Mother      Skin Cancer Mother      Diabetes Father      Endometriosis Sister        Social History: Positive for tobacco use    Physical Exam     Patient Vitals for the past 24 hrs:   BP Temp Temp src Pulse Resp SpO2 Weight   03/08/22 1915 -- -- -- 56 19 98 % --   03/08/22 1900 104/85 -- -- 67 19 96 % --   03/08/22 1845 -- -- -- 53 21 98 % --   03/08/22 1830 110/66 -- -- 57 27 97 % --   03/08/22 1800 107/73 -- -- 74 26 96 % --   03/08/22 1735 -- -- -- 73 20 97 % --   03/08/22 1700 117/71 -- -- 79 21 99 % --   03/08/22 1637 132/84 97.6  F (36.4  C) Temporal 120 18 99 % 72 kg  (158 lb 11.7 oz)       Physical Exam  General: The patient is alert, in no respiratory distress.    HENT: Mucous membranes moist.    Cardiovascular: Tachycardia that improved as I talked to her and came down to 99 good pulses in all four extremities. Normal capillary refill and skin turgor.     Respiratory: Lungs are clear. No nasal flaring. No retractions. No wheezing, no crackles.    Gastrointestinal: Abdomen soft. No guarding, no rebound. No palpable hernias.     Musculoskeletal: No gross deformity.     Skin: No rashes or petechiae.     Neurologic: The patient is alert and oriented x3. GCS 15. No testable cranial nerve deficit. Follows commands with clear and appropriate speech. Gives appropriate answers. Good strength in all extremities. No gross neurologic deficit. Gross sensation intact. Pupils are round and reactive. No meningismus.     Lymphatic: No cervical adenopathy. No lower extremity swelling.    Psychiatric: The patient is non-tearful.  She does appear anxious    Emergency Department Course   ECG:  ECG taken at 1646  Nonspecific ST segment      Imaging:  No orders to display       Laboratory:  Labs Ordered and Resulted from Time of ED Arrival to Time of ED Departure   BASIC METABOLIC PANEL - Abnormal       Result Value    Sodium 136      Potassium 3.5      Chloride 104      Carbon Dioxide (CO2) 28      Anion Gap 4      Urea Nitrogen 13      Creatinine 0.78      Calcium 9.7      Glucose 102 (*)     GFR Estimate >90     ROUTINE UA WITH MICROSCOPIC REFLEX TO CULTURE - Abnormal    Color Urine Light Yellow      Appearance Urine Clear      Glucose Urine Negative      Bilirubin Urine Negative      Ketones Urine 10  (*)     Specific Gravity Urine 1.017      Blood Urine Negative      pH Urine 5.0      Protein Albumin Urine Negative      Urobilinogen Urine Normal      Nitrite Urine Negative      Leukocyte Esterase Urine Negative      Mucus Urine Present (*)     RBC Urine <1      WBC Urine 1      Squamous  Epithelials Urine <1     CBC WITH PLATELETS AND DIFFERENTIAL - Abnormal    WBC Count 9.0      RBC Count 5.22      Hemoglobin 15.5      Hematocrit 46.1      MCV 88      MCH 29.7      MCHC 33.6      RDW 12.0      Platelet Count 282      % Neutrophils 52      % Lymphocytes 34      % Monocytes 5      % Eosinophils 9      % Basophils 0      % Immature Granulocytes 0      NRBCs per 100 WBC 0      Absolute Neutrophils 4.7      Absolute Lymphocytes 3.1      Absolute Monocytes 0.4      Absolute Eosinophils 0.8 (*)     Absolute Basophils 0.0      Absolute Immature Granulocytes 0.0      Absolute NRBCs 0.0     TROPONIN I - Normal    Troponin I High Sensitivity 3     D DIMER QUANTITATIVE - Normal    D-Dimer Quantitative <0.27         Procedures:      Emergency Department Course:           Reviewed:    I reviewed nursing notes, vitals and past history    Assessments:   I obtained history and examined the patient as noted above.    I rechecked the patient and explained findings.       Consults:              Disposition:  The patient was discharged to home.    Impression & Plan        Medical Decision Making:  The patient reports that she has asthma was moving at the time the symptoms started and I think a asthma exacerbation is possible.  Anxiety could also be a possibility.  I did consider PE however pregnancy ACS.  There was some mild nonspecific abnormalities in her EKG but her heart rate did improve.  I think that anxiety is playing some part in the symptoms and her heart rate did come down when I talked to her and she appeared more calm.  I discussed the possibly of PE, her D-dimer however was normal as was her troponin.  Her pretest probably for ACS was low as was her posttest probability.  I stressed to the patient that I did not find a cause such as anemia electrolyte malady arrhythmia is a possible issue that could have occurred before and now resolved.  I stressed she would need to follow-up with a primary care doctor  she may need a Holter monitor or other testing but I do not think this is a life-threatening condition and she is appropriate for outpatient follow-up.  The patient was discharged home in good condition.  Her symptoms have resolved    Covid-19  Kailey Argueta was evaluated during a global COVID-19 pandemic, which necessitated consideration that the patient might be at risk for infection with the SARS-CoV-2 virus that causes COVID-19.   Applicable protocols for evaluation were followed during the patient's care.   COVID-19 was considered as part of the patient's evaluation.    Diagnosis:    ICD-10-CM    1. Palpitations  R00.2    2. Lightheadedness  R42                 Ricardo Bolaños MD  03/08/22 [id number]

## 2022-03-09 ENCOUNTER — TELEPHONE (OUTPATIENT)
Dept: FAMILY MEDICINE | Facility: CLINIC | Age: 22
End: 2022-03-09
Payer: COMMERCIAL

## 2022-03-09 ENCOUNTER — PATIENT OUTREACH (OUTPATIENT)
Dept: CARE COORDINATION | Facility: CLINIC | Age: 22
End: 2022-03-09
Payer: COMMERCIAL

## 2022-03-09 DIAGNOSIS — Z71.89 OTHER SPECIFIED COUNSELING: ICD-10-CM

## 2022-03-09 LAB
ATRIAL RATE - MUSE: 100 BPM
DIASTOLIC BLOOD PRESSURE - MUSE: NORMAL MMHG
INTERPRETATION ECG - MUSE: NORMAL
P AXIS - MUSE: 74 DEGREES
PR INTERVAL - MUSE: 132 MS
QRS DURATION - MUSE: 74 MS
QT - MUSE: 356 MS
QTC - MUSE: 459 MS
R AXIS - MUSE: 25 DEGREES
SYSTOLIC BLOOD PRESSURE - MUSE: NORMAL MMHG
T AXIS - MUSE: -3 DEGREES
VENTRICULAR RATE- MUSE: 100 BPM

## 2022-03-09 NOTE — TELEPHONE ENCOUNTER
Pt calls, see recent ER visit, still has issues, scheduled F2F appointment next available 3/23, would like earlier appointment, scheduled Virtual visit Friday, pt agrees with this, routed FYI to [facility], if not okay please advise otherwise may close and pt will f/u Friday, will confirm F2F visit if needed after virtual visit  Pia Santiago RN, BSN  [facility]

## 2022-03-09 NOTE — PROGRESS NOTES
Clinic Care Coordination Contact  Community Health Worker Initial Outreach      Patient accepts CC: No, Pt declined needs for extra support, CHW scheduled f/u appt to be on 2/23/22, Confirmed with Pt.    Clinic Care Coordination Contact  [facility]: Post-Discharge Note  SITUATION                                                      Admission:    Admission Date: 03/08/22   Reason for Admission: Palpitations  Discharge:   Discharge Date: 03/08/22  Discharge Diagnosis: Palpitations    BACKGROUND                                                      Per hospital discharge summary and inpatient provider notes:    Kailey Argueta is a 22 year old adult who presents with a history of asthma as well as anxiety reports that several days ago she was helping her mom move doing some physical labor she felt like she was having a panic attack.  She reported her heart was racing she felt short of breath and lightheaded.  She took hydroxyzine which did not help    ASSESSMENT      Enrollment  Primary Care Care Coordination Status: Declined    Discharge Assessment  How are you doing now that you are home?: feeling the same  How are your symptoms? (Red Flag symptoms escalate to triage hotline per guidelines): Unchanged  Do you feel your condition is stable enough to be safe at home until your provider visit?: Yes  Does the patient have questions regarding their discharge instructions? : No  Were you started on any new medications or were there changes to any of your previous medications? : No  Does the patient have all of their medications?: Yes  Do you have questions regarding any of your medications? : No  Do you have all of your needed medical supplies or equipment (DME)?  (i.e. oxygen tank, CPAP, cane, etc.): Yes  Discharge follow-up appointment scheduled within 14 calendar days? : Yes  Discharge Follow Up Appointment Date: 03/23/22  Discharge Follow Up Appointment Scheduled with?: Primary Care Provider    Post-op (CHW CTA  Only)  If the patient had a surgery or procedure, do they have any questions for a nurse?: No         PLAN                                                      Outpatient Plan:      Follow-Ups: Schedule an appointment with Coming Zohra Garcia MD (Family Medicine) in 3 days (3/11/2022)    Future Appointments   Date Time Provider Department Center   3/23/2022 11:30 AM Coming Zohra Garcia MD [facility]         For any urgent concerns, please contact our 24 hour nurse triage line: [phone] ([phone])       KAYLA Arreguin  [phone]  [facility]

## 2022-03-09 NOTE — DISCHARGE INSTRUCTIONS
Discharge Instructions  Palpitations    Palpitations are an unusual awareness of your heartbeat. People often describe this as the heart skipping, fluttering, racing, irregular, or pounding. At this time, your doctor has found no signs that your palpitations are due to a serious or life-threatening condition. However, sometimes there is a serious problem that does not show up right away. It is important that you follow up with your doctor within 1 week, or as directed by your doctor today, to check for other serious problems. You may need more blood tests, a stress test, heart monitoring, or other tests.    Palpitations can be caused by caffeine, cigarettes, diet pills, energy drinks or supplements, other stimulants, and medications and street drugs. They can also be caused by anxiety, hormone conditions such as high thyroid, and other medical conditions. Sometimes they are a sign of abnormal rhythm in the heart, so you may need your heart checked.    Return to the Emergency Department if:  You get chest pain or tightness.  You are short of breath.  You get very weak or tired.  You pass out or faint.  Your heart rate is over 120 beats per minute for more than 10 minutes while you are resting.  You have any new symptoms, like fever, cough, numb legs, or you cough up blood.  You have anything else that worries you.    What can I do to help myself?  Fill any prescriptions the doctor gave you and take them right away.   Follow your doctor s instructions about the prescription medicines you are on. Sometimes the doctor may tell you to stop taking a medicine or change the dose.  If you smoke, this may be a good time to quit! The less you can smoke, the better.  Do not use energy drinks, diet pills, or stimulants. Limit your use of caffeine.    Follow up with your doctor:  Within 1 week, or sooner if instructed.  If you keep having palpitations.  If you need help to quit smoking.  If you were given a prescription for  medicine here today, be sure to read all of the information (including the package insert) that comes with your prescription.  This will include important information about the medicine, its side effects, and any warnings that you need to know about.  The pharmacist who fills the prescription can provide more information and answer questions you may have about the medicine.  If you have questions or concerns that the pharmacist cannot address, please call or return to the Emergency Department.         Opioid Medication Information    Pain medications are among the most commonly prescribed medicines, so we are including this information for all our patients. If you did not receive pain medication or get a prescription for pain medicine, you can ignore it.     You may have been given a prescription for an opioid (narcotic) pain medicine and/or have received a pain medicine while here in the Emergency Department. These medicines can make you drowsy or impaired. You must not drive, operate dangerous equipment, or engage in any other dangerous activities while taking these medications. If you drive while taking these medications, you could be arrested for DUI, or driving under the influence. Do not drink any alcohol while you are taking these medications.     Opioid pain medications can cause addiction. If you have a history of chemical dependency of any type, you are at a higher risk of becoming addicted to pain medications.  Only take these prescribed medications to treat your pain when all other options have been tried. Take it for as short a time and as few doses as possible. Store your pain pills in a secure place, as they are frequently stolen and provide a dangerous opportunity for children or visitors in your house to start abusing these powerful medications. We will not replace any lost or stolen medicine.  As soon as your pain is better, you should flush all your remaining medication.     Many prescription pain  medications contain Tylenol  (acetaminophen), including Vicodin , Tylenol #3 , Norco , Lortab , and Percocet .  You should not take any extra pills of Tylenol  if you are using these prescription medications or you can get very sick.  Do not ever take more than 3000 mg of acetaminophen in any 24 hour period.    All opioids tend to cause constipation. Drink plenty of water and eat foods that have a lot of fiber, such as fruits, vegetables, prune juice, apple juice and high fiber cereal.  Take a laxative if you don t move your bowels at least every other day. Miralax , Milk of Magnesia, Colace , or Senna  can be used to keep you regular.      Remember that you can always come back to the Emergency Department if you are not able to see your regular doctor in the amount of time listed above, if you get any new symptoms, or if there is anything that worries you.

## 2022-03-11 ENCOUNTER — VIRTUAL VISIT (OUTPATIENT)
Dept: FAMILY MEDICINE | Facility: CLINIC | Age: 22
End: 2022-03-11
Payer: COMMERCIAL

## 2022-03-11 ENCOUNTER — HOSPITAL ENCOUNTER (OUTPATIENT)
Dept: CARDIOLOGY | Facility: CLINIC | Age: 22
Discharge: HOME | End: 2022-03-11
Attending: FAMILY MEDICINE
Payer: COMMERCIAL

## 2022-03-11 DIAGNOSIS — R06.02 SOB (SHORTNESS OF BREATH): ICD-10-CM

## 2022-03-11 DIAGNOSIS — R00.2 PALPITATIONS: Primary | ICD-10-CM

## 2022-03-11 DIAGNOSIS — D58.2 ELEVATED HEMOGLOBIN (H): ICD-10-CM

## 2022-03-11 DIAGNOSIS — R00.2 PALPITATIONS: ICD-10-CM

## 2022-03-11 DIAGNOSIS — F41.9 ANXIETY: ICD-10-CM

## 2022-03-11 PROCEDURE — 93244 EXT ECG>48HR<7D REV&INTERPJ: CPT | Performed by: INTERNAL MEDICINE

## 2022-03-11 PROCEDURE — 99495 TRANSJ CARE MGMT MOD F2F 14D: CPT | Mod: 95 | Performed by: FAMILY MEDICINE

## 2022-03-11 NOTE — PROGRESS NOTES
"Annalisa is a 22 year old who is being evaluated via a billable phone visit.      How would you like to obtain your AVS? Trackyhart  Call to cellphone:  Text to cell phone: [phone]  Will anyone else be joining your video visit? No          Assessment & Plan     Palpitations  Suspect that anxiety is the cause of her palpitations but will get a holter monitor ordered, as her EKG in ED had nonspecific ST changes.  - Leadless EKG Monitor 3 to 7 Days; Future    Anxiety  Discussed with patient that I would like to rule out underlying cardiac etiology first before we change her anxiety medications too much.  She was okay with this.  She is currently using Gabapentin only at bedtime, said she could try one during the day if needed.  Continue using hydroxyzine PRN.  I will follow up with her on 3-23-22 for a previously schedule appointment and we will see where she is at in the process at that time, make further plans then.    SOB (shortness of breath)  Encouraged patient to try to use her inhaler more often to see if her symptoms respond to this, which can help us determine the cause of her symptoms (anxiety vs asthma).    Elevated Hemoglobin  Resolved, likely due to smoking.      33 minutes spent on the date of the encounter doing chart review, history and exam, documentation and further activities per the note     Tobacco Cessation:   reports that Annalisa Argueta has been smoking cigarettes. Annalisa Argueta has been smoking about 0.45 packs per day. Annalisa Argueta has never used smokeless tobacco.  Tobacco Cessation Action Plan: Information offered: Patient not interested at this time    BMI:   Estimated body mass index is 25.24 kg/m  as calculated from the following:    Height as of 2/2/22: 1.689 m (5' 6.5\").    Weight as of 3/8/22: 72 kg (158 lb 11.7 oz).       See Patient Instructions    No follow-ups on file.    Zohra Garcia MD  [facility]    Madonna Fang is a 22 year old who presents " for the following health issues     HPI     Post Discharge Outreach 3/9/2022   Admission Date 3/8/2022   Reason for Admission Palpitations   Discharge Date 3/8/2022   Discharge Diagnosis Palpitations   How are you doing now that you are home? feeling the same   How are your symptoms? (Red Flag symptoms escalate to triage hotline per guidelines) Unchanged   Do you feel your condition is stable enough to be safe at home until your provider visit? Yes   Does the patient have questions regarding their discharge instructions?  No   Were you started on any new medications or were there changes to any of your previous medications?  No   Does the patient have all of their medications? Yes   Do you have questions regarding any of your medications?  No   Do you have all of your needed medical supplies or equipment (DME)?  (i.e. oxygen tank, CPAP, cane, etc.) Yes   Discharge follow-up appointment scheduled within 14 calendar days?  Yes   Discharge Follow Up Appointment Date 3/23/2022   Discharge Follow Up Appointment Scheduled with? Primary Care Provider     Patient was seen in the ED with complaints of palpitations, lightheadedness, and SOB.  EKG showed tachycardia with a nonspecific ST abnormality but probability of ACS was very low.  Labs were all normal.  Suspected to be anxiety or possible arrhythmia that resolved spontaneously.  Patient does note that she felt somewhat like she might be having a panic attack on a previous occasion, though her hydroxyzine did not help her overall.  She does feel like it is harder to get air in.    Takes Gabapentin once a day and hydroxyzine as needed, at least once per day lately.    Felt like her heart was beating really fast, her watch with a HR monitor showed her at a normal HR.  Had some chest pain in the sense that it was hard to breath, had to take bigger breaths.  She had pleurisy in the past, of note, but the SOB and discomfort is much less severe than that.  She has been using  her albuterol inhaler minimally.    Current Outpatient Medications   Medication Sig Dispense Refill     albuterol (PROAIR HFA/PROVENTIL HFA/VENTOLIN HFA) 108 (90 Base) MCG/ACT inhaler Inhale 1-2 puffs into the lungs every 4 hours as needed for shortness of breath / dyspnea or wheezing 18 g 1     acetaminophen (TYLENOL) 500 MG tablet Take 500-1,000 mg by mouth every 6 hours as needed for mild pain       diclofenac (VOLTAREN) 1 % topical gel Apply 4 g topically 4 times daily as needed for moderate pain 350 g 0     fluticasone-salmeterol (ADVAIR) 500-50 MCG/DOSE inhaler Inhale 1 puff into the lungs every 12 hours 60 each 5     gabapentin (NEURONTIN) 300 MG capsule Take 300 mg by mouth 2 times daily       hydrOXYzine (VISTARIL) 50 MG capsule Take 50 mg by mouth       meloxicam (MOBIC) 7.5 MG tablet Take 1 tablet (7.5 mg) by mouth daily 90 tablet 0     naproxen (NAPROSYN) 500 MG tablet Take 1 tablet (500 mg) by mouth 2 times daily (with meals) As needed for pain 30 tablet 0     omeprazole (PRILOSEC) 20 MG DR capsule Take 1 capsule (20 mg) by mouth daily 90 capsule 1           Review of Systems   Constitutional, HEENT, cardiovascular, pulmonary, gi and gu systems are negative, except as otherwise noted.      Objective           Vitals:  No vitals were obtained today due to virtual visit.    Physical Exam   GENERAL: Healthy, alert and no distress  RESP: No audible wheeze, cough.  PSYCH: Mentation appears normal, affect normal/bright, judgement and insight intact, normal speech.    Labs and EKG reviewed in chart.          Total phone call time:  12 minutes.

## 2022-03-23 ENCOUNTER — OFFICE VISIT (OUTPATIENT)
Dept: FAMILY MEDICINE | Facility: CLINIC | Age: 22
End: 2022-03-23
Attending: FAMILY MEDICINE
Payer: COMMERCIAL

## 2022-03-23 VITALS
HEART RATE: 75 BPM | TEMPERATURE: 97.6 F | OXYGEN SATURATION: 98 % | HEIGHT: 67 IN | WEIGHT: 159 LBS | BODY MASS INDEX: 24.96 KG/M2 | DIASTOLIC BLOOD PRESSURE: 79 MMHG | SYSTOLIC BLOOD PRESSURE: 129 MMHG

## 2022-03-23 DIAGNOSIS — Z71.89 OTHER SPECIFIED COUNSELING: ICD-10-CM

## 2022-03-23 DIAGNOSIS — F12.20 CANNABIS DEPENDENCE (H): ICD-10-CM

## 2022-03-23 DIAGNOSIS — F39 MOOD DISORDER (H): ICD-10-CM

## 2022-03-23 DIAGNOSIS — J45.40 MODERATE PERSISTENT ASTHMA WITHOUT COMPLICATION: ICD-10-CM

## 2022-03-23 DIAGNOSIS — R00.2 PALPITATIONS: Primary | ICD-10-CM

## 2022-03-23 PROCEDURE — 96127 BRIEF EMOTIONAL/BEHAV ASSMT: CPT | Performed by: FAMILY MEDICINE

## 2022-03-23 PROCEDURE — 99214 OFFICE O/P EST MOD 30 MIN: CPT | Performed by: FAMILY MEDICINE

## 2022-03-23 RX ORDER — QUETIAPINE FUMARATE 50 MG/1
50 TABLET, FILM COATED ORAL AT BEDTIME
Qty: 30 TABLET | Refills: 1 | Status: SHIPPED | OUTPATIENT
Start: 2022-03-23 | End: 2022-06-03

## 2022-03-23 ASSESSMENT — ANXIETY QUESTIONNAIRES
3. WORRYING TOO MUCH ABOUT DIFFERENT THINGS: SEVERAL DAYS
5. BEING SO RESTLESS THAT IT IS HARD TO SIT STILL: NOT AT ALL
1. FEELING NERVOUS, ANXIOUS, OR ON EDGE: SEVERAL DAYS
GAD7 TOTAL SCORE: 6
2. NOT BEING ABLE TO STOP OR CONTROL WORRYING: NOT AT ALL
7. FEELING AFRAID AS IF SOMETHING AWFUL MIGHT HAPPEN: NOT AT ALL
6. BECOMING EASILY ANNOYED OR IRRITABLE: MORE THAN HALF THE DAYS
GAD7 TOTAL SCORE: 6
4. TROUBLE RELAXING: MORE THAN HALF THE DAYS
7. FEELING AFRAID AS IF SOMETHING AWFUL MIGHT HAPPEN: NOT AT ALL
GAD7 TOTAL SCORE: 6

## 2022-03-23 ASSESSMENT — PATIENT HEALTH QUESTIONNAIRE - PHQ9
SUM OF ALL RESPONSES TO PHQ QUESTIONS 1-9: 14
SUM OF ALL RESPONSES TO PHQ QUESTIONS 1-9: 14
10. IF YOU CHECKED OFF ANY PROBLEMS, HOW DIFFICULT HAVE THESE PROBLEMS MADE IT FOR YOU TO DO YOUR WORK, TAKE CARE OF THINGS AT HOME, OR GET ALONG WITH OTHER PEOPLE: VERY DIFFICULT

## 2022-03-23 ASSESSMENT — ASTHMA QUESTIONNAIRES: ACT_TOTALSCORE: 18

## 2022-03-23 NOTE — PROGRESS NOTES
Assessment & Plan     Palpitations  Zio patch results pending.  Suspect anxiety as primary cause.  Continue with Gabapentin.  Will make recommendations pending Zio results.    Mood disorder (H)  Think that patient would benefit from mood stabilizer.  Will start her on Quetiapine and follow up in 6-8 weeks or sooner as needed.  - QUEtiapine (SEROQUEL) 50 MG tablet; Take 1 tablet (50 mg) by mouth At Bedtime    Moderate persistent asthma without complication  Stable, refill medication.  - fluticasone-salmeterol (ADVAIR) 500-50 MCG/DOSE inhaler; Inhale 1 puff into the lungs every 12 hours    Cannabis dependence (H)  Unchanged.  Encourage Cessation.    Other specified counseling  - [facility] Discharge - Referral to CC    25 minutes spent on the date of the encounter doing chart review, history and exam, documentation and further activities per the note       See Patient Instructions    Return in about 6 weeks (around 5/4/2022) for Recheck Depression/Anxiety, Virtual Visit.    Zohra Garcia MD  [facility]    Madonna Fang is a 22 year old who presents for the following health issues     History of Present Illness       Mental Health Follow-up:                    Today's PHQ-9         PHQ-9 Total Score: 14  PHQ-9 Q9 Thoughts of better off dead/self-harm past 2 weeks :   (P) Several days  Thoughts of suicide or self harm: (P) Yes  Self-harm Plan:   (P) No  Self-harm Action:     (P) No  Safety concerns for self or others: (P) No    How difficult have these problems made it for you to do your work, take care of things at home, or get along with other people: Very difficult    Today's JOE-7 Score: 6    Reason for visit:  Follow up for er    Annalisa Argueta eats 2-3 servings of fruits and vegetables daily.Annalisa Argueta consumes 3 sweetened beverage(s) daily.Annalisa AGUILA Argueta exercises with enough effort to increase Annalisa Argueta's heart rate 10 to 19 minutes per day.  Annalisa AGUILA Argueta exercises with  "enough effort to increase Annalisa Argueta's heart rate 4 days per week. Annalisa Argueta is missing 4 dose(s) of medications per week.       Post Discharge Outreach 3/9/2022   Admission Date 3/8/2022   Reason for Admission Palpitations   Discharge Date 3/8/2022   Discharge Diagnosis Palpitations   How are you doing now that you are home? feeling the same   How are your symptoms? (Red Flag symptoms escalate to triage hotline per guidelines) Unchanged   Do you feel your condition is stable enough to be safe at home until your provider visit? Yes   Does the patient have questions regarding their discharge instructions?  No   Were you started on any new medications or were there changes to any of your previous medications?  No   Does the patient have all of their medications? Yes   Do you have questions regarding any of your medications?  No   Do you have all of your needed medical supplies or equipment (DME)?  (i.e. oxygen tank, CPAP, cane, etc.) Yes   Discharge follow-up appointment scheduled within 14 calendar days?  Yes   Discharge Follow Up Appointment Date 3/23/2022   Discharge Follow Up Appointment Scheduled with? Primary Care Provider       Patient said she may have an ear infection and that the symptoms come and go, started about 2 weeks ago, no fever but having dull constant pain in the right ear and sinus pressure on the right side.  Feels similar to past infections.    Heart feels good.  Ordered a Holter monitor, in process of being evaluated now.  Anxiety symptoms are still occurring, still has heart racing.  Tried to use the Gabapentin in the daytime, when she wakes up in the morning or a few hours later.  Brings everything down a notch, feels calm, things are less aggressive.  Took a mood stablizer in the past, Lamictal, which caused her to feel \"dead inside\".      PHQ 5/10/2021 2/2/2022 3/23/2022   PHQ-9 Total Score 18 10 14   Q9: Thoughts of better off dead/self-harm past 2 weeks Not at all Not at all Several " "days   F/U: Thoughts of suicide or self-harm - - Yes   F/U: Self harm-plan - - No   F/U: Self-harm action - - No   F/U: Safety concerns - - No       PHQ 5/10/2021 2/2/2022 3/23/2022   PHQ-9 Total Score 18 10 14   Q9: Thoughts of better off dead/self-harm past 2 weeks Not at all Not at all Several days   F/U: Thoughts of suicide or self-harm - - Yes   F/U: Self harm-plan - - No   F/U: Self-harm action - - No   F/U: Safety concerns - - No       Current Outpatient Medications   Medication Sig Dispense Refill     acetaminophen (TYLENOL) 500 MG tablet Take 500-1,000 mg by mouth every 6 hours as needed for mild pain       albuterol (PROAIR HFA/PROVENTIL HFA/VENTOLIN HFA) 108 (90 Base) MCG/ACT inhaler Inhale 1-2 puffs into the lungs every 4 hours as needed for shortness of breath / dyspnea or wheezing 18 g 1     diclofenac (VOLTAREN) 1 % topical gel Apply 4 g topically 4 times daily as needed for moderate pain 350 g 0     fluticasone-salmeterol (ADVAIR) 500-50 MCG/DOSE inhaler Inhale 1 puff into the lungs every 12 hours 60 each 5     gabapentin (NEURONTIN) 300 MG capsule Take 300 mg by mouth 2 times daily       hydrOXYzine (VISTARIL) 50 MG capsule Take 50 mg by mouth       omeprazole (PRILOSEC) 20 MG DR capsule Take 1 capsule (20 mg) by mouth daily 90 capsule 1     QUEtiapine (SEROQUEL) 50 MG tablet Take 1 tablet (50 mg) by mouth At Bedtime 30 tablet 1         Review of Systems   Constitutional, HEENT, cardiovascular, pulmonary, gi and gu systems are negative, except as otherwise noted.      Objective    /79 (BP Location: Right arm, Patient Position: Chair, Cuff Size: Adult Regular)   Pulse 75   Temp 97.6  F (36.4  C) (Oral)   Ht 1.702 m (5' 7\")   Wt 72.1 kg (159 lb)   LMP 02/24/2022   SpO2 98%   BMI 24.90 kg/m    Body mass index is 24.9 kg/m .  Physical Exam   GENERAL: healthy, alert and no distress  RESP: lungs clear to auscultation - no rales, rhonchi or wheezes  CV: regular rate and rhythm, normal S1 S2, " no S3 or S4, no murmur, click or rub, no peripheral edema and peripheral pulses strong  PSYCH: mentation appears normal, affect normal/bright

## 2022-03-24 ASSESSMENT — PATIENT HEALTH QUESTIONNAIRE - PHQ9: SUM OF ALL RESPONSES TO PHQ QUESTIONS 1-9: 14

## 2022-03-24 ASSESSMENT — ANXIETY QUESTIONNAIRES: GAD7 TOTAL SCORE: 6

## 2022-04-04 NOTE — TELEPHONE ENCOUNTER
[facility]          RECORDS STATUS - ALL OTHER DIAGNOSIS      RECORDS RECEIVED FROM: [facility]/ ([facility])    DATE RECEIVED: 9/15/2020    NOTES STATUS DETAILS   OFFICE NOTE from referring provider     OFFICE NOTE from medical oncologist N/A ([facility]  Records are in [facility]   DISCHARGE SUMMARY from hospital Complete 6/21/2020 Abdominal Pain - [facility]   DISCHARGE REPORT from the ER     OPERATIVE REPORT Complete 4/30/2019 Hysteroscopy    MEDICATION LIST Complete [facility]   CLINICAL TRIAL TREATMENTS TO DATE     LABS     PATHOLOGY REPORTS     ANYTHING RELATED TO DIAGNOSIS Complete Labs last updated on 7/28/2020    GENONOMIC TESTING     TYPE:     IMAGING (NEED IMAGES & REPORT)     CT SCANS Complete CT abdomen Pelvis 6/11/2020    MRI     MAMMO     ULTRASOUND Complete - [facility]     Complete US Pelvis 6/24/2020     US Pelvic Complete 6/11/2020, 2/8/2020, 5/7/2019, 4/2/2019    US Transvaginal 4/25/2020    PET       Action    Action Taken 8/18/2020 3:04pm     I called [facility]'s Radiology Dept Ph: [phone] - Dali will push IMG to PACS          Destiny Ocasio MD  04/04/22 [id number]

## 2022-04-23 DIAGNOSIS — F39 MOOD DISORDER (H): ICD-10-CM

## 2022-04-25 RX ORDER — QUETIAPINE FUMARATE 50 MG/1
TABLET, FILM COATED ORAL
Qty: 90 TABLET | Refills: 1 | OUTPATIENT
Start: 2022-04-25

## 2022-04-25 NOTE — TELEPHONE ENCOUNTER
Has refill, needs appointment before ongoing refills  Pia Santiago RN, BSN  [facility]

## 2022-05-06 ENCOUNTER — VIRTUAL VISIT (OUTPATIENT)
Dept: FAMILY MEDICINE | Facility: CLINIC | Age: 22
End: 2022-05-06
Payer: COMMERCIAL

## 2022-05-06 DIAGNOSIS — M25.551 HIP PAIN, RIGHT: Primary | ICD-10-CM

## 2022-05-06 DIAGNOSIS — F39 MOOD DISORDER (H): ICD-10-CM

## 2022-05-06 PROCEDURE — 99214 OFFICE O/P EST MOD 30 MIN: CPT | Mod: 95 | Performed by: FAMILY MEDICINE

## 2022-05-06 RX ORDER — MELOXICAM 7.5 MG/1
7.5 TABLET ORAL
COMMUNITY
Start: 2022-02-02 | End: 2022-08-05 | Stop reason: ALTCHOICE

## 2022-05-06 RX ORDER — NAPROXEN 500 MG/1
TABLET ORAL
COMMUNITY
Start: 2021-08-02 | End: 2022-08-05

## 2022-05-06 NOTE — PROGRESS NOTES
Annalisa is a 22 year old who is being evaluated via a billable video visit.      How would you like to obtain your AVS? MyChart  If the video visit is dropped, the invitation should be resent by: Text to cell phone: [phone]  Will anyone else be joining your video visit? No    Video Start Time: 10:33 am    Assessment & Plan     Hip pain, right  Will send patient to orthopedics since she has not been here before.  Advised her that the disability application process is all on her end, so she will need to get into contact with the appropriate people for this.  Continue with current regimen and follow up in one month or sooner as needed.  - Orthopedic  Referral; Future    Mood disorder (H)  Will have patient try half a tablet of seroquel and see if this is tolerable.  She reports that she got her records from her previous provider and that she will be looking through them to find the other medications she has tried in the past.  Follow up in one month or sooner as needed.      16 minutes spent on the date of the encounter doing chart review, history and exam, documentation and further activities per the note       See Patient Instructions    Return in about 1 month (around 6/6/2022) for Recheck Depression/Anxiety/Hip pain.    Zohra Garcia MD  [facility]    Subjective   Annalisa is a 22 year old who presents for the following health issues     HPI     Pain History:  When did you first notice your pain? - Post-Acute Pain   Have you seen any provider previously for this issue? Yes - physical therapy   How has your pain affected your ability to work? 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 Musculoskeletal problem/pain  Onset/Duration: ongoing for 2 years  Description  Location: hip - right  Joint Swelling: no  Redness: no  Pain: YES  Warmth: no  Radiates: down right leg to feet  Intensity:  Moderate  7/10  Progression of Symptoms:  worsening  Accompanying signs and symptoms:   Fevers: no  Numbness/tingling/weakness: YES- numbness/tingling in right leg  History  Trauma to the area: YES- when child fell   Recent illness:  no  Previous similar problem: no  Previous evaluation:  YES- rheumatologist   Precipitating or alleviating factors:  Aggravating factors include: standing and overuse  Therapies tried and outcome: Advil and alleve with some relief    Has been consistently having hip pain.  She would like to work on getting some disability started.  However, she was told that she cannot apply for social security disability because she is still working.  She has been able to look into [place] disability, which her sister gets, which could be helpful for her.    [facility] PT in the past, recommended stretches, but has not seen ortho at all for her hip pain, right sided only.  She uses meloxicam as needed, Naproxen at other times.  Has topical Voltaren gel as well.  Has Gabapentin for non-pain use.    With seroquel, she is very tired the next day.  We started this as a mood stabilizer and for her anxiety, but she is not taking it consistently due to grogginess the next day.    Current Outpatient Medications   Medication Sig Dispense Refill     acetaminophen (TYLENOL) 500 MG tablet Take 500-1,000 mg by mouth every 6 hours as needed for mild pain       albuterol (PROAIR HFA/PROVENTIL HFA/VENTOLIN HFA) 108 (90 Base) MCG/ACT inhaler Inhale 1-2 puffs into the lungs every 4 hours as needed for shortness of breath / dyspnea or wheezing 18 g 1     diclofenac (VOLTAREN) 1 % topical gel Apply 4 g topically 4 times daily as needed for moderate pain 350 g 0     fluticasone-salmeterol (ADVAIR) 500-50 MCG/DOSE inhaler Inhale 1 puff into the lungs every 12 hours 60 each 5     gabapentin (NEURONTIN) 300 MG capsule Take 300 mg by mouth 2 times daily       hydrOXYzine (VISTARIL) 50 MG capsule Take 50 mg by mouth       meloxicam (MOBIC) 7.5 MG  tablet Take 7.5 mg by mouth       naproxen (NAPROSYN) 500 MG tablet TAKE 1 TABLET (500 MG) BY MOUTH 2 TIMES DAILY (WITH MEALS) AS NEEDED FOR PAIN       QUEtiapine (SEROQUEL) 50 MG tablet Take 1 tablet (50 mg) by mouth At Bedtime 30 tablet 1     omeprazole (PRILOSEC) 20 MG DR capsule Take 1 capsule (20 mg) by mouth daily 90 capsule 1         Review of Systems   Constitutional, HEENT, cardiovascular, pulmonary, gi and gu systems are negative, except as otherwise noted.      Objective           Vitals:  No vitals were obtained today due to virtual visit.    Physical Exam   GENERAL: Healthy, alert and no distress  EYES: Eyes grossly normal to inspection.  No discharge or erythema, or obvious scleral/conjunctival abnormalities.  RESP: No audible wheeze, cough, or visible cyanosis.  No visible retractions or increased work of breathing.    SKIN: Visible skin clear. No significant rash, abnormal pigmentation or lesions.  NEURO: Cranial nerves grossly intact.  Mentation and speech appropriate for age.  PSYCH: Mentation appears normal, affect normal/bright, judgement and insight intact, normal speech and appearance well-groomed.            Video-Visit Details    Type of service:  Video Visit    Video End Time:1043 am    Originating Location (pt. Location): Home    Distant Location (provider location):  [facility]     Platform used for Video Visit: [employer]

## 2022-05-27 ENCOUNTER — E-VISIT (OUTPATIENT)
Dept: FAMILY MEDICINE | Facility: CLINIC | Age: 22
End: 2022-05-27
Payer: COMMERCIAL

## 2022-05-27 DIAGNOSIS — M25.551 HIP PAIN, RIGHT: Primary | ICD-10-CM

## 2022-05-27 DIAGNOSIS — K52.9 GASTROENTERITIS: ICD-10-CM

## 2022-05-27 PROCEDURE — 99422 OL DIG E/M SVC 11-20 MIN: CPT | Performed by: PHYSICIAN ASSISTANT

## 2022-05-27 NOTE — LETTER
[facility]  [address] [address]  Phone: [phone]    May 30, 2022        Kailey Argueta  [address] [address]          To whom it may concern:    RE: Kailey Argueta    Patient was seen and treated today at our clinic and missed work due to illness. She missed 05/25/22-05/27/22 due to this.     Please contact me for questions or concerns.      Sincerely,        Kendall Mahmood PA-C

## 2022-05-27 NOTE — LETTER
[facility]  [address] [address]  Phone: [phone]    May 30, 2022        Kailey Argueta  [address] [address] [address]          To whom it may concern:    RE: Kailey Argueta    Patient has history of chronic right hip pain and has been seen for this in the past where recommendations to be allowed stool use throughout her work day was made. Again, this recommendation should remain in effect for the foreseeable future.     Please contact me for questions or concerns.      Sincerely,        Kendall Mahmood PA-C

## 2022-06-02 DIAGNOSIS — F39 MOOD DISORDER (H): ICD-10-CM

## 2022-06-03 RX ORDER — QUETIAPINE FUMARATE 50 MG/1
TABLET, FILM COATED ORAL
Qty: 30 TABLET | Refills: 1 | Status: SHIPPED | OUTPATIENT
Start: 2022-06-03 | End: 2022-07-07

## 2022-06-03 NOTE — TELEPHONE ENCOUNTER
Routing request to provider for review/approval because:  Labs not current: Lipid panel    Visit is up to date. RN will issue one time maahmed refill. Please advise on labs.   Mitchell HERNANDEZ RN

## 2022-07-04 DIAGNOSIS — F39 MOOD DISORDER (H): ICD-10-CM

## 2022-07-07 RX ORDER — QUETIAPINE FUMARATE 50 MG/1
TABLET, FILM COATED ORAL
Qty: 90 TABLET | Refills: 0 | Status: SHIPPED | OUTPATIENT
Start: 2022-07-07 | End: 2022-10-06

## 2022-07-07 NOTE — TELEPHONE ENCOUNTER
90 day supply requested. Prescription approved per [facility] Refill Protocol.  Mitchell HERNANDEZ RN

## 2022-07-14 ENCOUNTER — TELEPHONE (OUTPATIENT)
Dept: FAMILY MEDICINE | Facility: CLINIC | Age: 22
End: 2022-07-14

## 2022-07-14 ENCOUNTER — MYC MEDICAL ADVICE (OUTPATIENT)
Dept: FAMILY MEDICINE | Facility: CLINIC | Age: 22
End: 2022-07-14

## 2022-07-14 ASSESSMENT — ASTHMA QUESTIONNAIRES: ACT_TOTALSCORE: 15

## 2022-07-14 NOTE — TELEPHONE ENCOUNTER
Patient Quality Outreach    Patient is due for the following:   Asthma  -  ACT needed    NEXT STEPS:   No follow up needed at this time.    Type of outreach:    Sent [employer] message.      Questions for provider review:    None     Jose Luis Gallagher

## 2022-07-22 ENCOUNTER — TELEPHONE (OUTPATIENT)
Dept: FAMILY MEDICINE | Facility: CLINIC | Age: 22
End: 2022-07-22

## 2022-07-22 NOTE — TELEPHONE ENCOUNTER
Reason for Call:  Other appointment -- SEE BELOW     Detailed comments: MED CHECK - ADHD     Phone Number Patient can be reached at: Home number on file [phone] (home)    Best Time: ANY    Can we leave a detailed message on this number? YES     Patient has been without adhd meds from 2017. Patient was reassessed in January. Has been effecting patients depression and anxiety.     Would like to get appt asap.     Call taken on 7/22/2022 at 10:29 AM by Bettie Golden

## 2022-07-22 NOTE — TELEPHONE ENCOUNTER
Pt wants to start back on her ADHD meds, but wants to make sure we have her most recent assessment done on 1/20/22 from [facility].   -Left message for  to fax records to [phone].  -Scheduled pt for virtual visit with Dr. Kim Garcia on 8/5/22 at 9:10 AM.     [employer]  [address] [place] [address]  [phone]    Roxy GATICA RN, BSN, PHN - Patient Advocate Liaison - PAL RN  [facility]  [phone]

## 2022-07-26 NOTE — TELEPHONE ENCOUNTER
Received Fax with latest assessment assessment from [facility].   -On Dr. Kim Garcia's Desk.   -Appointment is Friday August 5th 11:30 virtual  -Called pt and let her know we received the assessment.    Roxy GATICA RN, BSN, PHN - Patient Advocate Liaison - PAL RN  [facility]  [phone]

## 2022-07-31 ENCOUNTER — APPOINTMENT (OUTPATIENT)
Dept: MRI IMAGING | Facility: CLINIC | Age: 22
End: 2022-07-31
Attending: EMERGENCY MEDICINE
Payer: COMMERCIAL

## 2022-07-31 ENCOUNTER — NURSE TRIAGE (OUTPATIENT)
Dept: NURSING | Facility: CLINIC | Age: 22
End: 2022-07-31

## 2022-07-31 ENCOUNTER — HOSPITAL ENCOUNTER (EMERGENCY)
Facility: CLINIC | Age: 22
Discharge: HOME | End: 2022-07-31
Attending: EMERGENCY MEDICINE | Admitting: EMERGENCY MEDICINE
Payer: COMMERCIAL

## 2022-07-31 VITALS
RESPIRATION RATE: 16 BRPM | BODY MASS INDEX: 22.98 KG/M2 | HEIGHT: 67 IN | OXYGEN SATURATION: 99 % | SYSTOLIC BLOOD PRESSURE: 121 MMHG | HEART RATE: 54 BPM | WEIGHT: 146.39 LBS | DIASTOLIC BLOOD PRESSURE: 97 MMHG | TEMPERATURE: 97.2 F

## 2022-07-31 DIAGNOSIS — M51.26 LUMBAR DISC HERNIATION: ICD-10-CM

## 2022-07-31 DIAGNOSIS — M54.17 LUMBOSACRAL RADICULOPATHY AT S1: ICD-10-CM

## 2022-07-31 LAB
ALBUMIN UR-MCNC: NEGATIVE MG/DL
ANION GAP SERPL CALCULATED.3IONS-SCNC: 10 MMOL/L (ref 7–15)
APPEARANCE UR: CLEAR
BASOPHILS # BLD AUTO: 0 10E3/UL (ref 0–0.2)
BASOPHILS NFR BLD AUTO: 1 %
BILIRUB UR QL STRIP: NEGATIVE
BUN SERPL-MCNC: 10.9 MG/DL (ref 6–20)
CALCIUM SERPL-MCNC: 9.7 MG/DL (ref 8.6–10)
CHLORIDE SERPL-SCNC: 107 MMOL/L (ref 98–107)
COLOR UR AUTO: YELLOW
CREAT SERPL-MCNC: 0.67 MG/DL (ref 0.51–1.17)
DEPRECATED HCO3 PLAS-SCNC: 25 MMOL/L (ref 22–29)
EOSINOPHIL # BLD AUTO: 0.4 10E3/UL (ref 0–0.7)
EOSINOPHIL NFR BLD AUTO: 5 %
ERYTHROCYTE [DISTWIDTH] IN BLOOD BY AUTOMATED COUNT: 12.2 % (ref 10–15)
GFR SERPL CREATININE-BSD FRML MDRD: >90 ML/MIN/1.73M2
GLUCOSE SERPL-MCNC: 111 MG/DL (ref 70–99)
GLUCOSE UR STRIP-MCNC: NEGATIVE MG/DL
HCG UR QL: NEGATIVE
HCT VFR BLD AUTO: 45.6 % (ref 35–53)
HGB BLD-MCNC: 15.2 G/DL (ref 11.7–17.7)
HGB UR QL STRIP: NEGATIVE
IMM GRANULOCYTES # BLD: 0 10E3/UL
IMM GRANULOCYTES NFR BLD: 0 %
KETONES UR STRIP-MCNC: NEGATIVE MG/DL
LEUKOCYTE ESTERASE UR QL STRIP: NEGATIVE
LYMPHOCYTES # BLD AUTO: 3.7 10E3/UL (ref 0.8–5.3)
LYMPHOCYTES NFR BLD AUTO: 46 %
MCH RBC QN AUTO: 30.6 PG (ref 26.5–33)
MCHC RBC AUTO-ENTMCNC: 33.3 G/DL (ref 31.5–36.5)
MCV RBC AUTO: 92 FL (ref 78–100)
MONOCYTES # BLD AUTO: 0.5 10E3/UL (ref 0–1.3)
MONOCYTES NFR BLD AUTO: 6 %
MUCOUS THREADS #/AREA URNS LPF: PRESENT /LPF
NEUTROPHILS # BLD AUTO: 3.4 10E3/UL (ref 1.6–8.3)
NEUTROPHILS NFR BLD AUTO: 42 %
NITRATE UR QL: NEGATIVE
NRBC # BLD AUTO: 0 10E3/UL
NRBC BLD AUTO-RTO: 0 /100
PH UR STRIP: 6 [PH] (ref 5–7)
PLATELET # BLD AUTO: 241 10E3/UL (ref 150–450)
POTASSIUM SERPL-SCNC: 3.6 MMOL/L (ref 3.4–5.3)
RBC # BLD AUTO: 4.96 10E6/UL (ref 3.8–5.9)
RBC URINE: 1 /HPF
SODIUM SERPL-SCNC: 142 MMOL/L (ref 136–145)
SP GR UR STRIP: 1.02 (ref 1–1.03)
SQUAMOUS EPITHELIAL: 2 /HPF
UROBILINOGEN UR STRIP-MCNC: NORMAL MG/DL
WBC # BLD AUTO: 8.1 10E3/UL (ref 4–11)
WBC URINE: <1 /HPF

## 2022-07-31 PROCEDURE — 99284 EMERGENCY DEPT VISIT MOD MDM: CPT | Mod: 25

## 2022-07-31 PROCEDURE — 85025 COMPLETE CBC W/AUTO DIFF WBC: CPT | Performed by: EMERGENCY MEDICINE

## 2022-07-31 PROCEDURE — 81001 URINALYSIS AUTO W/SCOPE: CPT | Performed by: EMERGENCY MEDICINE

## 2022-07-31 PROCEDURE — 81025 URINE PREGNANCY TEST: CPT | Performed by: EMERGENCY MEDICINE

## 2022-07-31 PROCEDURE — 36415 COLL VENOUS BLD VENIPUNCTURE: CPT | Performed by: EMERGENCY MEDICINE

## 2022-07-31 PROCEDURE — 80048 BASIC METABOLIC PNL TOTAL CA: CPT | Performed by: EMERGENCY MEDICINE

## 2022-07-31 PROCEDURE — 72148 MRI LUMBAR SPINE W/O DYE: CPT

## 2022-07-31 ASSESSMENT — ENCOUNTER SYMPTOMS
JOINT SWELLING: 0
SHORTNESS OF BREATH: 0
NUMBNESS: 1
BACK PAIN: 0
CONSTIPATION: 1
ABDOMINAL PAIN: 1
COLOR CHANGE: 0
DIARRHEA: 1
CHILLS: 0
FEVER: 0

## 2022-07-31 NOTE — TELEPHONE ENCOUNTER
"Patient calling - says she has been having occasional sharp abdominal pain/stabbing/cramping for the last 2 months ago.  Pain in the abdomen comes and goes.  Says it is very sharp pain and feels like someone is stabbing her and lasts for 30-40 seconds.  Pain has been present for last 10 minutes.  Pain is lower right side of abdomen.  Has been feeling nausea.  Pain is severe when the stabbing sensation occurs and she doubles over in pain.  Stabbing pain is not occurring right now    Last night she had nerve pain in right leg from toes to groin. Says it felt like her l\"eg had a hard time pumping blood.\"  She had numbness in her leg that lasted about an hour.  Says she still has pain leg.   Says she is starting to feel numbness in her right leg.  Says the numbness in the right leg started about 5 minutes ago.    Triaged to disposition of Call  Now.     Latha Grider RN  Triage Nurse Advisor      Reason for Disposition    [1] MODERATE pain (e.g., interferes with normal activities) AND [2] pain comes and goes (cramps) AND [3] present > 24 hours  (Exception: pain with Vomiting or Diarrhea - see that Guideline)    [1] Numbness (i.e., loss of sensation) of the face, arm / hand, or leg / foot on one side of the body AND [2] sudden onset AND [3] present now    Additional Information    Negative: Passed out (i.e., lost consciousness, collapsed and was not responding)    Negative: Shock suspected (e.g., cold/pale/clammy skin, too weak to stand, low BP, rapid pulse)    Negative: Difficult to awaken or acting confused (e.g., disoriented, slurred speech)    Negative: Sounds like a life-threatening emergency to the triager    Negative: Chest pain    Negative: Pain is mainly in upper abdomen  (if needed ask: \"is it mainly above the belly button?\")    Negative: Followed an abdomen (stomach) injury    Negative: [1] Abdominal pain AND [2] pregnant < 20 weeks    Negative: [1] Abdominal pain AND [2] pregnant > 20 weeks    " "Negative: [1] Abdominal pain AND [2] postpartum (from 0 to 6 weeks after delivery)    Negative: [1] SEVERE pain (e.g., excruciating) AND [2] present > 1 hour    Negative: [1] SEVERE pain AND [2] age > 60    Negative: [1] Vomiting AND [2] contains red blood or black (\"coffee ground\") material  (Exception: few red streaks in vomit that only happened once)    Negative: Blood in bowel movements   (Exception: blood on surface of BM with constipation)    Negative: Black or tarry bowel movements  (Exception: chronic-unchanged  black-grey bowel movements AND is taking iron pills or Pepto-bismol)    Negative: Patient sounds very sick or weak to the triager    Negative: [1] MILD-MODERATE pain AND [2] constant AND [3] present > 2 hours    Negative: [1] Vomiting AND [2] abdomen looks much more swollen than usual    Negative: [1] Vomiting AND [2] contains bile (green color)    Negative: White of the eyes have turned yellow (i.e., jaundice)    Negative: Fever > 103 F (39.4 C)    Negative: [1] Fever > 100.0 F (37.8 C) AND [2] bedridden (e.g., nursing home patient, CVA, chronic illness, recovering from surgery)    Negative: [1] Fever > 101 F (38.3 C) AND [2] age > 60    Negative: [1] Fever > 100.0 F (37.8 C) AND [2] diabetes mellitus or weak immune system (e.g., HIV positive, cancer chemo, splenectomy, organ transplant, chronic steroids)    Negative: [1] SEVERE pain AND [2] present < 1 hour    Protocols used: ABDOMINAL PAIN - FEMALE-A-AH, NEUROLOGIC DEFICIT-A-AH      "

## 2022-07-31 NOTE — ED TRIAGE NOTES
Pt. Presents to ED with complaints of pins and needles in her R leg from her toes to her groin. Pt. Also reports pain and cramping in her R calf and posterior thigh. Reports it started at 0100. Pt. Also reports some lower R sided abdominal cramping but reports the leg pain is worse. Bilateral pedal pulses present.        negative - no atopy

## 2022-07-31 NOTE — ED PROVIDER NOTES
History   Chief Complaint:  Leg Pain and Abdominal Pain       The history is provided by the patient.      Kailey Argueta is a 22 year old adult who presents with leg pain and abdominal pain. The patient reports that last night she develop a pins and needles sensation in her right leg which persisted for 1 hour. She reports that at the time the episode began, she was laying in bed having arrived home from a drive 1-2 hours prior. At the moment of onset she realized that she couldn't feel her right leg after hitting it against a wall. Today at work she experienced a similar episode. She reports that the sensation develops first across her right foot at the level of her toes and works up her leg into her crotch. The sensation is only present on her right side.  She denies recent falls, injuries. She also denies any redness, swelling, fever, chills, back pain, chest pain, or shortness of breath.       Review of Systems   Constitutional: Negative for chills and fever.   Respiratory: Negative for shortness of breath.    Cardiovascular: Negative for chest pain and leg swelling.   Gastrointestinal: Positive for abdominal pain (cramping), constipation and diarrhea.   Musculoskeletal: Negative for back pain and joint swelling.   Skin: Negative for color change.   Neurological: Positive for numbness.   All other systems reviewed and are negative.    Allergies:  Kiwi    Medications:  Seroquel  Prilosec  Naprosyn  Mobic  Vistaril  Neurontin  Advair  Voltaren  Proair    Past Medical History:     Panic attack  Mood disorder  Migraine  GERD  Chronic abdominal pain bilateral lower quadrant  Borderline personality disorder  Cannabis abuse  Tobacco use disorder  Suicidal thoughts  Homicidal ideation  PTSD  Asthma    Past Surgical History:    Induced     Family History:    Mother: Diabetes, hypertension, skin cancer  Father: Diabetes  Sister: Endometriosis    Social History:  The patient presents to the ED alone. She arrived  "via private vehicle. She works at a dry .    Physical Exam     Patient Vitals for the past 24 hrs:   BP Temp Temp src Pulse Resp SpO2 Height Weight   07/31/22 2100 (!) 121/97 -- -- -- -- 99 % -- --   07/31/22 1945 (!) 130/91 -- -- 54 -- 100 % -- --   07/31/22 1827 -- -- -- -- -- -- -- 66.4 kg (146 lb 6.2 oz)   07/31/22 1826 131/81 97.2  F (36.2  C) Temporal 74 16 100 % 1.702 m (5' 7\") --       Physical Exam  Constitutional: Patient is well appearing. No distress.  Head: Atraumatic.  Eyes: Conjunctivae are normal. No scleral icterus.  Neck: Normal range of motion. Neck supple.   Cardiovascular: Normal rate, regular rhythm, normal heart sounds and intact distal perfusion.   Pulmonary/Chest: Breath sounds normal. No respiratory distress.  Abdominal: Soft. Bowel sounds are normal. No distension. No tenderness. No rebound or guarding.   Musculoskeletal: Normal range of motion. No edema or tenderness.    Neurological: Alert and orientated to person, place, and time. No observable focal neuro deficit.  Strength 5/5throughout lower extremity as is sensation intact at this time.  Skin: Warm and dry. No rash noted. Not diaphoretic.       Emergency Department Course     Imaging:  Lumbar spine MRI w/o contrast   Final Result   IMPRESSION:   1.  Small disc protrusion L5-S1 that contacts the right S1 nerve root.   2.  Mild scattered facet arthropathy.        Report per radiology    Laboratory:  Labs Ordered and Resulted from Time of ED Arrival to Time of ED Departure   ROUTINE UA WITH MICROSCOPIC REFLEX TO CULTURE - Abnormal       Result Value    Color Urine Yellow      Appearance Urine Clear      Glucose Urine Negative      Bilirubin Urine Negative      Ketones Urine Negative      Specific Gravity Urine 1.017      Blood Urine Negative      pH Urine 6.0      Protein Albumin Urine Negative      Urobilinogen Urine Normal      Nitrite Urine Negative      Leukocyte Esterase Urine Negative      Mucus Urine Present (*)     RBC " Urine 1      WBC Urine <1      Squamous Epithelials Urine 2 (*)    BASIC METABOLIC PANEL - Abnormal    Creatinine 0.67      Sodium 142      Potassium 3.6      Urea Nitrogen 10.9      Chloride 107      Carbon Dioxide (CO2) 25      Anion Gap 10      Glucose 111 (*)     GFR Estimate >90      Calcium 9.7     HCG QUALITATIVE URINE - Normal    hCG Urine Qualitative Negative     CBC WITH PLATELETS AND DIFFERENTIAL    WBC Count 8.1      RBC Count 4.96      Hemoglobin 15.2      Hematocrit 45.6      MCV 92      MCH 30.6      MCHC 33.3      RDW 12.2      Platelet Count 241      % Neutrophils 42      % Lymphocytes 46      % Monocytes 6      % Eosinophils 5      % Basophils 1      % Immature Granulocytes 0      NRBCs per 100 WBC 0      Absolute Neutrophils 3.4      Absolute Lymphocytes 3.7      Absolute Monocytes 0.5      Absolute Eosinophils 0.4      Absolute Basophils 0.0      Absolute Immature Granulocytes 0.0      Absolute NRBCs 0.0       Emergency Department Course:         Reviewed:  I reviewed nursing notes, vitals, past medical history and Care Everywhere    Assessments:  1859 I obtained history and examined the patient as noted above.     Disposition:  The patient was discharged to home.     Impression & Plan     Medical Decision Making:  Kailey Argueta is a 22 year old adult who presents for evaluation of leg pain. There are no red flag symptoms at this point to warrant further workup, and no signs of serious underlying etiologies that could mimic neuropathy flare-up like edema, infection, thrombosis, compression of nerve structures higher up, etc. MRI obtained as above and defines the dermatomal sx she describes.  Supportive outpatient management indicated with PCP for consideration of further medication management and PT.      Diagnosis:    ICD-10-CM    1. Lumbar disc herniation  M51.26    2. Lumbosacral radiculopathy at S1  M54.17        Discharge Medications:  Discharge Medication List as of 7/31/2022  9:08 PM           Scribe Disclosure:  I, Roma Emeli, am serving as a scribe at 6:51 PM on 7/31/2022 to document services personally performed by Efrain Rosa MD based on my observations and the provider's statements to me.        Efrain Rosa MD  08/01/22 0100

## 2022-08-05 ENCOUNTER — VIRTUAL VISIT (OUTPATIENT)
Dept: FAMILY MEDICINE | Facility: CLINIC | Age: 22
End: 2022-08-05
Payer: COMMERCIAL

## 2022-08-05 ENCOUNTER — E-VISIT (OUTPATIENT)
Dept: FAMILY MEDICINE | Facility: CLINIC | Age: 22
End: 2022-08-05

## 2022-08-05 ENCOUNTER — MYC REFILL (OUTPATIENT)
Dept: FAMILY MEDICINE | Facility: CLINIC | Age: 22
End: 2022-08-05

## 2022-08-05 ENCOUNTER — TELEPHONE (OUTPATIENT)
Dept: FAMILY MEDICINE | Facility: CLINIC | Age: 22
End: 2022-08-05

## 2022-08-05 DIAGNOSIS — Z11.59 NEED FOR HEPATITIS C SCREENING TEST: ICD-10-CM

## 2022-08-05 DIAGNOSIS — Z11.59 NEED FOR HEPATITIS C SCREENING TEST: Primary | ICD-10-CM

## 2022-08-05 DIAGNOSIS — M51.26 LUMBAR DISC HERNIATION: ICD-10-CM

## 2022-08-05 DIAGNOSIS — F90.9 ATTENTION DEFICIT HYPERACTIVITY DISORDER (ADHD), UNSPECIFIED ADHD TYPE: Primary | ICD-10-CM

## 2022-08-05 DIAGNOSIS — F90.9 ATTENTION DEFICIT HYPERACTIVITY DISORDER (ADHD), UNSPECIFIED ADHD TYPE: ICD-10-CM

## 2022-08-05 DIAGNOSIS — Z11.4 SCREENING FOR HIV (HUMAN IMMUNODEFICIENCY VIRUS): ICD-10-CM

## 2022-08-05 DIAGNOSIS — F90.2 ATTENTION DEFICIT HYPERACTIVITY DISORDER (ADHD), COMBINED TYPE: Primary | ICD-10-CM

## 2022-08-05 PROBLEM — F84.0 AUTISM SPECTRUM: Status: ACTIVE | Noted: 2022-01-20

## 2022-08-05 PROCEDURE — 99421 OL DIG E/M SVC 5-10 MIN: CPT | Performed by: FAMILY MEDICINE

## 2022-08-05 PROCEDURE — 99214 OFFICE O/P EST MOD 30 MIN: CPT | Mod: 95 | Performed by: FAMILY MEDICINE

## 2022-08-05 RX ORDER — METHYLPHENIDATE 1.1 MG/H
1 PATCH TRANSDERMAL DAILY
Qty: 7 PATCH | Refills: 0 | Status: SHIPPED | OUTPATIENT
Start: 2022-08-05 | End: 2022-08-05

## 2022-08-05 RX ORDER — NAPROXEN 500 MG/1
TABLET ORAL
Qty: 60 TABLET | Refills: 1 | Status: SHIPPED | OUTPATIENT
Start: 2022-08-05 | End: 2022-09-14

## 2022-08-05 RX ORDER — METHYLPHENIDATE 1.1 MG/H
1 PATCH TRANSDERMAL DAILY
Qty: 30 PATCH | Refills: 0 | Status: SHIPPED | OUTPATIENT
Start: 2022-08-05 | End: 2022-08-19

## 2022-08-05 NOTE — TELEPHONE ENCOUNTER
Reason for call:  Other   Patient called regarding (reason for call):   REORDER LABs    Additional comments:   The lab is unable to do an ad-on lab with the specimens as they are too old.  Please reorder labs and inform patient.    Phone number to reach patient:  Home number on file [phone] (home) or Cell number on file:    Telephone Information:   Mobile [phone]       Best Time:  any    Can we leave a detailed message on this number?  YES    Travel screening: Not Applicable

## 2022-08-05 NOTE — TELEPHONE ENCOUNTER
Dr. Kim Garcia,   -Lab specimen was too old to add on Hep C and HIV, please sign new orders.   -Pt has lab appointment scheduled Wednesday August 10th at 1:00 PM    Roxy GATICA RN, BSN, PHN - Patient Advocate Liaison - PAL RN  [facility]  [phone]

## 2022-08-05 NOTE — PROGRESS NOTES
Annalisa is a 22 year old who is being evaluated via a billable video visit.      How would you like to obtain your AVS? MyChart  If the video visit is dropped, the invitation should be resent by: Text to cell phone: [phone]  Will anyone else be joining your video visit? No        Assessment & Plan     Attention deficit hyperactivity disorder (ADHD), unspecified ADHD type  Patient would like to try Daytrana, so we will send in a prescription for this for her to try.  She will the 10 mg dose for one week, then will update us on how it works and if she needs a higher dose, and medication will be sent in as appropriate.  Follow up in 6 months if things are going well afterwards.    Addendum: Received message from patient that the pharmacy would not open a box of 30 patches for her to get 7 as prescribed.  New new Rx for #30 sent to pharmacy.    - methylphenidate (DAYTRANA) 10 MG/9HR patch; Place 1 patch onto the skin daily wear patch for 9 hours only each day    Lumbar disc herniation  Referral to PT, Refill medication.  Follow up after PT or sooner as needed.  - Physical Therapy Referral; Future  - naproxen (NAPROSYN) 500 MG tablet; TAKE 1 TABLET (500 MG) BY MOUTH 2 TIMES DAILY (WITH MEALS) AS NEEDED FOR PAIN    Screening for HIV (human immunodeficiency virus)  - HIV Antigen Antibody Combo; Future    Need for hepatitis C screening test  - Hepatitis C Screen Reflex to HCV RNA Quant and Genotype; Future      26 minutes spent on the date of the encounter doing chart review, history and exam, documentation and further activities per the note       See Patient Instructions    Return in about 6 months (around 2/5/2023) for ADHD Medication Recheck.    Zohra Garcia MD  [facility]    Madonna Fang is a 22 year old, presenting for the following health issues:  No chief complaint on file.      History of Present Illness       Reason for visit:  Adhd    Annalisa Argueta eats 2-3 servings of  fruits and vegetables daily.Annalisa Argueta consumes 1 sweetened beverage(s) daily.Annalisa Argueta exercises with enough effort to increase Annalisa Argueta's heart rate 9 or less minutes per day.  Annalisa Argueta exercises with enough effort to increase Annalisa Argueta's heart rate 3 or less days per week. Annalisa Argueta is missing 2 dose(s) of medications per week.  Annalisa Argueta is not taking prescribed medications regularly due to cost of medication.     ADHD Follow-Up (Adult)  Concerns: Unmedicated since 2016  Changes since last visit: n/a.  Taking controlled (daily) medications as prescribed: NO Details: Has not been on medication since 2016  Adult ADHD Self-Reporting form given to patient?:  No  Currently in counseling: No    Medication Benefits:   Controlled symptoms: None  Uncontrolled symptoms:  Attention span and Distractability        Previously took: Adderall, Vyvanse, and Amphetamine salts. Took Adderall at 7 am and the salts around noon for school.  After that Tried Vyvanse but could not get more than trial packs because of insurance issues. She thinks that the meds all seemed to stop working after a while and says she reached the highest dose of Adderall.    Went to ED because of her back pain, was found to have compressed disc with herniation at L5-S1.  Has gabapentin, which helps a little bit.  Has Naproxen, which she needs a refill of.  This helped more than Meloxicam.  Was not given a referral for PT or Ortho.    Review of Systems   Constitutional, HEENT, cardiovascular, pulmonary, gi and gu systems are negative, except as otherwise noted.      Objective           Vitals:  No vitals were obtained today due to virtual visit.    Physical Exam   GENERAL: Healthy, alert and no distress  EYES: Eyes grossly normal to inspection.  No discharge or erythema, or obvious scleral/conjunctival abnormalities.  RESP: No audible wheeze, cough, or visible cyanosis.  No visible retractions or increased work of breathing.    SKIN:  Visible skin clear. No significant rash, abnormal pigmentation or lesions.  NEURO: Cranial nerves grossly intact.  Mentation and speech appropriate for age.  PSYCH: Mentation appears normal, affect normal/bright, judgement and insight intact, normal speech and appearance well-groomed.    Labs and imaging reviewed in chart          Video-Visit Details    Video Start Time: 11:36 am    Type of service:  Video Visit    Video End Time:11:54 am    Originating Location (pt. Location): Home    Distant Location (provider location):  [facility]     Platform used for Video Visit: [employer]    Lilly Carr.

## 2022-08-05 NOTE — PATIENT INSTRUCTIONS
Thank you for choosing us for your care. I have placed an order for a prescription so that you can start treatment. View your full visit summary for details by clicking on the link below. Your pharmacist will able to address any questions you may have about the medication.     If you're not feeling better within 5-7 days, please schedule an appointment.  You can schedule an appointment right here in [facility], or call [phone]  If the visit is for the same symptoms as your eVisit, we'll refund the cost of your eVisit if seen within seven days.

## 2022-08-05 NOTE — TELEPHONE ENCOUNTER
"Provider E-Visit time total (minutes): 10 minutes      Received message about patient \"freaking out and doing bad acts\".  Called patient immediately given it is after hours and triage not available.    Patient reports that she started hyperventilating and started hitting herself, took a while to stop, she has some scratches on her body but overall she is okay.    She informed me that the Pharmacy would not open a box of thirty patches to give her 7 as prescribed.  I informed her that I would send in a new Rx for the 30 patches.  She said she contacted her insurance and they should cover the medication for her.    Discussed with her that if the medication is too expensive or she has other problems to remain calm and know that I will address this on Monday.  Patient tried to contact our office and was sent to [facility] somehow.  With her autism diagnosis, I will make her an SB3 temporarily so she has a direct point of contact.  "

## 2022-08-08 ENCOUNTER — PATIENT OUTREACH (OUTPATIENT)
Dept: FAMILY MEDICINE | Facility: CLINIC | Age: 22
End: 2022-08-08

## 2022-08-08 NOTE — LETTER
Kailey Argueta  [address] [address] [address]    Vesna Fang,     Thank you for choosing [facility] today for your health care needs.     [facility] is transforming primary care  At [facility], we re dedicated to constantly improve how we serve the health care needs of our patients and communities. We re currently making changes to the way we deliver care.     Changes you ll notice include:    An emphasis on building a relationship with a primary care provider    Access to a PAL (patient advocate and liaison) to help guide you with your care needs    Appointment lengths tailored to your specific needs and greater access to a care team to help you and your provider improve and maintain your health and well-being    Improved online access to your care team    Benefits of a primary care provider  If you don t have a designated primary care provider, we encourage you to get to know our care team online and find a provider you d like to see. Most of our providers have a short video on their online provider page. Visit [email or web address] to explore our providers and locations.    Benefits of having a primary care provider include:      They get to know you - your health history, family history and goals, making it easier to make a health plan together.     You get to know them - making health-related conversations and decisions easier      Primary care doctors help you when you re sick or hurt - but also focus on keeping you healthy with preventive care and screenings.      A doctor who sees you regularly is more likely to notice changes in your health.     You ll be connected to a broad care team who partners with your provider to support you.    Patient Advocate Liaison (PAL)   To help make sure you get the right care, at the right time, we include PALs, or Patient Advocate Liaisons, as part of your care team. Your PAL will be your first line of contact. They ll advocate for your needs and  help you navigate our services, connecting you with care team members and community resources to ensure your care is well coordinated. You ll be introduced to a PAL in an upcoming visit.     Expanded care team access with tailored appointment lengths  Depending on your health care needs, you may have longer or shorter appointments and see additional care team providers - including Medication Therapy Management (MTM) pharmacists, diabetes educators, behavioral health clinicians, or social workers. At times, they may be included in your visit with your provider, or you may see them individually.     Online access to your health care records and care team  [employer] is our online tool that makes it easy to see your health care information and communicate with your care team.     [employer] allows you to:     View your health maintenance plan so you know when you re due for a preventive screening    Send secure messages to your care team    View your health history and visit summaries     Schedule appointments     Complete questionnaires and eCheck-in before appointments      Get care from your provider with an e-visit      View and pay your bill     Sign up at [email or web address]. Once you have an account, you also can download the mobile efrem.     Connecting to fast and convenient care  When you need fast, convenient care - consider one of the following options:       Video Visit: A convenient care option for visiting with your provider out of the comfort of your own home. Most of the things you come to the clinic to address with your provider can now be done virtually through a video. This includes your chronic medication follow up, questions or concerns you may have, and even your annual Medicare Wellness Visit.       Phone Visit: Another convenient option for follow up of common problems that may require a more in-depth discussion with your provider.       E-visit: When you need acute care quickly, or have a quick  question about your medication, an E-visit is completed through [employer] and your provider will respond within one business day.      Roxy GATICA, RN, BSN, PHN - Patient Advocate Liaison - PAL RN  [facility]  [phone]

## 2022-08-08 NOTE — TELEPHONE ENCOUNTER
SB 3 PAL Welcome Letter Sent  Roxy GATICA RN, BSN, PHN - Patient Advocate Liaison - PAL RN  [facility]  [phone]

## 2022-08-09 ENCOUNTER — THERAPY VISIT (OUTPATIENT)
Dept: PHYSICAL THERAPY | Facility: CLINIC | Age: 22
End: 2022-08-09
Attending: FAMILY MEDICINE
Payer: COMMERCIAL

## 2022-08-09 DIAGNOSIS — M51.26 LUMBAR DISC HERNIATION: ICD-10-CM

## 2022-08-09 DIAGNOSIS — M54.41 CHRONIC BILATERAL LOW BACK PAIN WITH RIGHT-SIDED SCIATICA: ICD-10-CM

## 2022-08-09 DIAGNOSIS — G89.29 CHRONIC BILATERAL LOW BACK PAIN WITH RIGHT-SIDED SCIATICA: ICD-10-CM

## 2022-08-09 PROCEDURE — 97530 THERAPEUTIC ACTIVITIES: CPT | Mod: GP | Performed by: PHYSICAL THERAPIST

## 2022-08-09 PROCEDURE — 97110 THERAPEUTIC EXERCISES: CPT | Mod: GP | Performed by: PHYSICAL THERAPIST

## 2022-08-09 PROCEDURE — 97161 PT EVAL LOW COMPLEX 20 MIN: CPT | Mod: GP | Performed by: PHYSICAL THERAPIST

## 2022-08-09 NOTE — PROGRESS NOTES
[facility]    OUTPATIENT Physical Therapy ORTHOPEDIC EVALUATION  PLAN OF TREATMENT FOR OUTPATIENT REHABILITATION  (COMPLETE FOR INITIAL CLAIMS ONLY)  Patient's Last Name, First Name, M.I.  YOB: 2000  Kailey Argueta    Provider s Name:  [facility]   Medical Record No.  [id number]   Start of Care Date:  08/09/22   Onset Date:   07/31/22 (ER visit date)   Type:     _X__PT   ___OT Medical Diagnosis:    Encounter Diagnoses   Name Primary?    Lumbar disc herniation     Chronic bilateral low back pain with right-sided sciatica         Treatment Diagnosis:  LBP        Goals:     08/09/22 0500   Body Part   Goals listed below are for LBP   Goal #1   Goal #1 ambulation   Previous Functional Level No restrictions   Current Functional Level Minutes patient can walk   Performance Level 10 with upto 7/10 pain   STG Target Performance Minutes patient will be able to walk   Performance Level 25 with 4/10 pain   Rationale for safe household ambulation;for safe outdoor household ambulation;for safe community ambulation;for safe work place ambulation;to maintain proper body mechanics/posture while ambulating to avoid additional compensatory injury due to improper gait mechanics;to promote a healthy and active lifestyle   Due Date 08/30/22    LTG Target Performance Minutes patient will be able to  walk   Performance Level 60+ pain free   Rationale for safe household ambulation;for safe outdoor household ambulation;for safe community ambulation;for safe work place ambulation;to maintain proper body mechanics/posture while ambulating to avoid additional compensatory injury due to improper gait mechanics;to promote a healthy and active lifestyle   Due Date 10/04/22       Therapy Frequency:  1x/week  Predicted Duration of Therapy Intervention:  8 weeks    Ricky Rahman, PT                  I CERTIFY THE NEED FOR THESE SERVICES FURNISHED UNDER        THIS PLAN OF TREATMENT AND WHILE UNDER MY CARE     (Physician attestation of this document indicates review and certification of the therapy plan).                     Certification Date From:  08/09/22   Certification Date To:  10/07/22    Referring Provider:  Zohra Alvarez Hay    Initial Assessment        See Epic Evaluation SOC Date: 08/09/22

## 2022-08-09 NOTE — PROGRESS NOTES
Physical Therapy Initial Evaluation  Subjective:  The history is provided by the patient. No  was used.   Patient Health History  Kailey Argueta being seen for low back pain.     Date of Onset: 2 weeks.   Problem occurred: unknown   Pain is reported as 7/10 on pain scale.  General health as reported by patient is good.  Pertinent medical history includes: asthma, depression, heart problems, mental illness, migraines/headaches, overweight and smoking.   Red flags:  Unexplained weight loss.   Other medical allergies details: see EPIC.       Current medications:  Anti-depressants, anti-inflammatory and pain medication.    Current occupation is  attendent.   Primary job tasks include:  Prolonged sitting, prolonged standing and repetitive tasks.                  Therapist Generated HPI Evaluation  Problem details: Pt c/o LBP with R L/E radicular symptoms.  R leg N/T > LBP.  Current episode flared up end of July and went to ER 7/31/2022.  Has noted previous R sided symptoms that she related to R hip origin but some low back involvement.  MD order date 8/5/2022..         Type of problem:  Lumbar.    This is a recurrent condition.  Condition occurred with:  Insidious onset.  Where condition occurred: for unknown reasons.  Patient reports pain:  Lower lumbar spine, central lumbar spine and lumbar spine right.  Pain is described as aching, shooting, stabbing and sharp   Pain radiates to:  Gluteals right, thigh right, knee right, lower leg right and foot right. Pain is the same all the time.  Since onset symptoms are unchanged.  Associated symptoms:  Loss of strength, tingling and numbness (entire R leg). Symptoms are exacerbated by bending, sitting, standing, lying down, twisting and lifting  and relieved by NSAID's, analgesics and rest.  Special tests included:  MRI (in ER-small disc bulge L5-S1 with S1 nerve contact).    Restrictions due to condition include:  Working in normal job with  restrictions.  Barriers include:  None as reported by patient.                        Objective:  System         Lumbar/SI Evaluation  ROM:  Arom wnl lumbar: CECILIA: pain during, increased after, SINA: +/- during, NE after, REIL: pain during , decreased after.  AROM Lumbar:   Flexion:          ERT/P finger tips vs flat hands  Ext:                    Min/mod loss +   Side Bend:        Left:  ERT    Right:  ERP  Rotation:           Left:     Right:   Side Glide:        Left:     Right:         Strength: Fair core stab recruitment.  GM: R 4-/5, L 5-/5, glute max R 4/5, L 5-/5  Lumbar Myotomes:  normal                Lumbar Dermtomes:  normal                Neural Tension/Mobility:    Left side:  SLR w/DF and Jordan-Lumbar (mild (+)) positive.   Right side:   SLR w/DF and Jordan-Lumbar positive.  Lumbar Palpation:        Tenderness not present at Right:  PSISErector spinae right (lumbar): mild TTP.  Functional Tests:      Single Leg Bridge: Left:    Right:   % of Uninvolved:  +        SI joint/Sacrum:    (-)SIJ testing. Normal alignment                                                       General     ROS    Assessment/Plan:    Patient is a 22 year old adult with lumbar complaints.    Patient has the following significant findings with corresponding treatment plan.                Diagnosis 1:  LBP  Pain -  hot/cold therapy, US, electric stimulation, mechanical traction, manual therapy, directional preference exercise and home program  Decreased ROM/flexibility - manual therapy and therapeutic exercise  Decreased strength - therapeutic exercise and therapeutic activities  Decreased proprioception - neuro re-education and therapeutic activities  Impaired gait - gait training  Impaired muscle performance - neuro re-education  Decreased function - therapeutic activities  Impaired posture - neuro re-education    Therapy Evaluation Codes:   Cumulative Therapy Evaluation is: Low complexity.    Previous and current functional  limitations:  (See Goal Flow Sheet for this information)    Short term and Long term goals: (See Goal Flow Sheet for this information)     Communication ability:  Patient appears to be able to clearly communicate and understand verbal and written communication and follow directions correctly.  Treatment Explanation - The following has been discussed with the patient:   RX ordered/plan of care  Anticipated outcomes  Possible risks and side effects  This patient would benefit from PT intervention to resume normal activities.   Rehab potential is good.    Frequency:  1 X week, once daily  Duration:  for 8 weeks  Discharge Plan:  Achieve all LTG.  Independent in home treatment program.  Reach maximal therapeutic benefit.    Please refer to the daily flowsheet for treatment today, total treatment time and time spent performing 1:1 timed codes.

## 2022-08-10 ENCOUNTER — LAB (OUTPATIENT)
Dept: LAB | Facility: CLINIC | Age: 22
End: 2022-08-10
Payer: COMMERCIAL

## 2022-08-10 DIAGNOSIS — Z11.4 SCREENING FOR HIV (HUMAN IMMUNODEFICIENCY VIRUS): ICD-10-CM

## 2022-08-10 DIAGNOSIS — Z11.3 SCREENING FOR STDS (SEXUALLY TRANSMITTED DISEASES): Primary | ICD-10-CM

## 2022-08-10 DIAGNOSIS — Z11.59 NEED FOR HEPATITIS C SCREENING TEST: ICD-10-CM

## 2022-08-10 PROCEDURE — 36415 COLL VENOUS BLD VENIPUNCTURE: CPT

## 2022-08-10 PROCEDURE — 87389 HIV-1 AG W/HIV-1&-2 AB AG IA: CPT

## 2022-08-10 PROCEDURE — 87591 N.GONORRHOEAE DNA AMP PROB: CPT

## 2022-08-10 PROCEDURE — 87491 CHLMYD TRACH DNA AMP PROBE: CPT

## 2022-08-10 PROCEDURE — 86803 HEPATITIS C AB TEST: CPT

## 2022-08-11 LAB
C TRACH DNA SPEC QL NAA+PROBE: NEGATIVE
HCV AB SERPL QL IA: NONREACTIVE
HIV 1+2 AB+HIV1 P24 AG SERPL QL IA: NONREACTIVE
N GONORRHOEA DNA SPEC QL NAA+PROBE: NEGATIVE

## 2022-08-16 ENCOUNTER — THERAPY VISIT (OUTPATIENT)
Dept: PHYSICAL THERAPY | Facility: CLINIC | Age: 22
End: 2022-08-16
Payer: COMMERCIAL

## 2022-08-16 DIAGNOSIS — M54.41 CHRONIC BILATERAL LOW BACK PAIN WITH RIGHT-SIDED SCIATICA: Primary | ICD-10-CM

## 2022-08-16 DIAGNOSIS — G89.29 CHRONIC BILATERAL LOW BACK PAIN WITH RIGHT-SIDED SCIATICA: Primary | ICD-10-CM

## 2022-08-16 PROCEDURE — 97112 NEUROMUSCULAR REEDUCATION: CPT | Mod: GP | Performed by: PHYSICAL THERAPIST

## 2022-08-16 PROCEDURE — 97110 THERAPEUTIC EXERCISES: CPT | Mod: GP | Performed by: PHYSICAL THERAPIST

## 2022-08-19 ENCOUNTER — MYC REFILL (OUTPATIENT)
Dept: FAMILY MEDICINE | Facility: CLINIC | Age: 22
End: 2022-08-19

## 2022-08-19 DIAGNOSIS — F90.9 ATTENTION DEFICIT HYPERACTIVITY DISORDER (ADHD), UNSPECIFIED ADHD TYPE: ICD-10-CM

## 2022-08-22 RX ORDER — METHYLPHENIDATE 1.1 MG/H
1 PATCH TRANSDERMAL DAILY
Qty: 30 PATCH | Refills: 0 | Status: SHIPPED | OUTPATIENT
Start: 2022-09-05 | End: 2022-08-26

## 2022-08-26 ENCOUNTER — VIRTUAL VISIT (OUTPATIENT)
Dept: FAMILY MEDICINE | Facility: CLINIC | Age: 22
End: 2022-08-26
Payer: COMMERCIAL

## 2022-08-26 ENCOUNTER — MYC MEDICAL ADVICE (OUTPATIENT)
Dept: FAMILY MEDICINE | Facility: CLINIC | Age: 22
End: 2022-08-26

## 2022-08-26 DIAGNOSIS — F90.9 ATTENTION DEFICIT HYPERACTIVITY DISORDER (ADHD), UNSPECIFIED ADHD TYPE: ICD-10-CM

## 2022-08-26 DIAGNOSIS — F90.2 ATTENTION DEFICIT HYPERACTIVITY DISORDER (ADHD), COMBINED TYPE: Primary | ICD-10-CM

## 2022-08-26 PROCEDURE — 99213 OFFICE O/P EST LOW 20 MIN: CPT | Mod: 95 | Performed by: FAMILY MEDICINE

## 2022-08-26 RX ORDER — METHYLPHENIDATE 1.1 MG/H
1 PATCH TRANSDERMAL DAILY
Qty: 30 PATCH | Refills: 0 | Status: SHIPPED | OUTPATIENT
Start: 2022-08-27 | End: 2022-10-01

## 2022-08-26 ASSESSMENT — ASTHMA QUESTIONNAIRES
ACT_TOTALSCORE: 19
QUESTION_4 LAST FOUR WEEKS HOW OFTEN HAVE YOU USED YOUR RESCUE INHALER OR NEBULIZER MEDICATION (SUCH AS ALBUTEROL): TWO OR THREE TIMES PER WEEK
QUESTION_1 LAST FOUR WEEKS HOW MUCH OF THE TIME DID YOUR ASTHMA KEEP YOU FROM GETTING AS MUCH DONE AT WORK, SCHOOL OR AT HOME: A LITTLE OF THE TIME
QUESTION_2 LAST FOUR WEEKS HOW OFTEN HAVE YOU HAD SHORTNESS OF BREATH: ONCE OR TWICE A WEEK
QUESTION_5 LAST FOUR WEEKS HOW WOULD YOU RATE YOUR ASTHMA CONTROL: WELL CONTROLLED
QUESTION_3 LAST FOUR WEEKS HOW OFTEN DID YOUR ASTHMA SYMPTOMS (WHEEZING, COUGHING, SHORTNESS OF BREATH, CHEST TIGHTNESS OR PAIN) WAKE YOU UP AT NIGHT OR EARLIER THAN USUAL IN THE MORNING: ONCE OR TWICE
ACT_TOTALSCORE: 19

## 2022-08-26 NOTE — PROGRESS NOTES
Annalisa is a 22 year old who is being evaluated via a billable video visit.      How would you like to obtain your AVS? MyChart  If the video visit is dropped, the invitation should be resent by: Text to cell phone: [phone]  Will anyone else be joining your video visit? No        Assessment & Plan     Attention deficit hyperactivity disorder (ADHD), combined type  Current dose working well.  Advised patient to call the  for replacement patches or other alternative solutions when she has torn patches, as insurance will not often pay for early refills or replacements.  Continue current dose and follow up in 6 months or sooner as needed.        7 minutes spent on the date of the encounter doing chart review, history and exam, documentation and further activities per the note       See Patient Instructions    No follow-ups on file.    Zohra Garcia MD  [facility]    Madonna Fang is a 22 year old, presenting for the following health issues:  Recheck Medication (ADHD)      History of Present Illness       Reason for visit:  Adhd check-in    Annalisa Argueta eats 2-3 servings of fruits and vegetables daily.Annalisa Argueta consumes 2 sweetened beverage(s) daily.Annalisa Argueta exercises with enough effort to increase Annalisa Argueta's heart rate 10 to 19 minutes per day.  Annalisa Argueta exercises with enough effort to increase Annalisa Argueta's heart rate 4 days per week. Annalisa Argueat is missing 2 dose(s) of medications per week.  Annalisa Argueta is not taking prescribed medications regularly due to remembering to take.       Medication Followup of methylphenidate (DAYTRANA) 10 MG/9HR patch    Taking Medication as prescribed: yes    Side Effects:  None    Medication Helping Symptoms:  yes    Has issues with the patch ripping and tearing when trying to get the adhesive backing off, says that they are hit or miss  Medications is working well for her at the current dose.      Current Outpatient  "Medications   Medication Sig Dispense Refill     acetaminophen (TYLENOL) 500 MG tablet Take 500-1,000 mg by mouth every 6 hours as needed for mild pain       albuterol (PROAIR HFA/PROVENTIL HFA/VENTOLIN HFA) 108 (90 Base) MCG/ACT inhaler Inhale 1-2 puffs into the lungs every 4 hours as needed for shortness of breath / dyspnea or wheezing 18 g 1     diclofenac (VOLTAREN) 1 % topical gel Apply 4 g topically 4 times daily as needed for moderate pain 350 g 0     fluticasone-salmeterol (ADVAIR) 500-50 MCG/DOSE inhaler Inhale 1 puff into the lungs every 12 hours 60 each 5     gabapentin (NEURONTIN) 300 MG capsule Take 300 mg by mouth 2 times daily       hydrOXYzine (VISTARIL) 50 MG capsule Take 50 mg by mouth       [START ON 9/5/2022] methylphenidate (DAYTRANA) 10 MG/9HR patch Place 1 patch onto the skin daily wear patch for 9 hours only each day 30 patch 0     naproxen (NAPROSYN) 500 MG tablet TAKE 1 TABLET (500 MG) BY MOUTH 2 TIMES DAILY (WITH MEALS) AS NEEDED FOR PAIN 60 tablet 1     omeprazole (PRILOSEC) 20 MG DR capsule Take 1 capsule (20 mg) by mouth daily 90 capsule 1     QUEtiapine (SEROQUEL) 50 MG tablet TAKE 1 TABLET BY MOUTH EVERYDAY AT BEDTIME 90 tablet 0         Review of Systems   Constitutional, HEENT, cardiovascular, pulmonary, gi and gu systems are negative, except as otherwise noted.      Objective    Vitals - Patient Reported  Weight (Patient Reported): 67.1 kg (148 lb)  Height (Patient Reported): 170.2 cm (5' 7\")  BMI (Based on Pt Reported Ht/Wt): 23.18      Vitals:  No vitals were obtained today due to virtual visit.    Physical Exam   GENERAL: Healthy, alert and no distress  EYES: Eyes grossly normal to inspection.  No discharge or erythema, or obvious scleral/conjunctival abnormalities.  RESP: No audible wheeze, cough, or visible cyanosis.  No visible retractions or increased work of breathing.    SKIN: Visible skin clear. No significant rash, abnormal pigmentation or lesions.  NEURO: Cranial nerves " grossly intact.  Mentation and speech appropriate for age.  PSYCH: Mentation appears normal, affect normal/bright, judgement and insight intact, normal speech and appearance well-groomed.            Video-Visit Details    Video Start Time: 11:31 am    Type of service:  Video Visit    Video End Time:11:36 AM    Originating Location (pt. Location): Home    Distant Location (provider location):  [facility] [employer]     Platform used for Video Visit: Malcolm Carr.

## 2022-08-26 NOTE — TELEPHONE ENCOUNTER
Patient calling stating that she called [employer] of her Daytrana patches and was told her lot number was recalled, was advised to remove the patch asap and dispose of patches at the police station.      Daytrana script, need a new one as she has to turn the medication patches into the police.     Patient asking for a new script for patches.        Called [facility] at [phone], spoke with DOMENICO Everett. She advised that they can't do a product replacement, the patient should contact [facility] to report the issue. Marguerite stated that if the provider does an early refill to the pharmacy, the patient can pay cash and submit a copy of the receipt to [facility], and maybe  able to be reimbursed in certain situations.     Clarified with patient that she understands the reimbursement process. Patient did understand.     **Provider patient requesting new script for Daytrana to be sent to her pharmacy. Pended medication and pharmacy.       Nancy SANTA RN on 8/26/2022 at 5:11 PM

## 2022-08-29 RX ORDER — METHYLPHENIDATE 1.1 MG/H
1 PATCH TRANSDERMAL DAILY
Qty: 30 PATCH | Refills: 0 | Status: CANCELLED | OUTPATIENT
Start: 2022-08-29

## 2022-08-29 NOTE — TELEPHONE ENCOUNTER
Called [facility] in [place] at [phone] regarding the patient's methylphenidate (DAYTRANA) 10 MG/9HR patch.    Spoke with Sid, Pharmacist and he stated that the patient's methylphenidate (DAYTRANA) 10 MG/9HR patch medication is ready to be picked up from the pharmacy today.     Called patient at [phone] and left a message regarding her medication and informed her that she can pick it up from the pharmacy today per the pharmacist.   - Informed her to check her indeni message   - Informed her to call us back if she has any additional questions or concerns     Kailey HERNANDEZ RN   Patient Advocate Liaison (PAL)  [facility]

## 2022-08-29 NOTE — TELEPHONE ENCOUNTER
Dr. Kim Garcia,     Pt spoke with , her box of patches was recalled.     methylphenidate (DAYTRANA) 10 MG/9HR patch    Teed up a new prescription order and indicated to pharmacy previous box was recalled. Please sign as medication not on [facility] profile.     Roxy GATICA, RN, BSN, PHN - Patient Advocate Liaison - PAL RN  [facility]  [phone]

## 2022-09-14 DIAGNOSIS — M51.26 LUMBAR DISC HERNIATION: ICD-10-CM

## 2022-09-14 RX ORDER — NAPROXEN 500 MG/1
TABLET ORAL
Qty: 60 TABLET | Refills: 1 | Status: SHIPPED | OUTPATIENT
Start: 2022-09-14 | End: 2022-11-23

## 2022-09-14 NOTE — TELEPHONE ENCOUNTER
Routing refill request to provider for review/approval because:  Labs out of range:  Blood pressure  Labs not current: AST, ALT    Therese DEL RIO RN, BSN, PHN  [facility]

## 2022-09-27 NOTE — PROGRESS NOTES
Discharge Note    Progress reporting period is from initial eval to Aug 16, 2022.     Kailey failed to return for next follow up visit and current status is unknown.  Please see information below for last relevant information on current status.  Patient seen for Rxs Used: 2 visits.  SUBJECTIVE  Subjective changes noted by patient:  Subjective: Doing better less sharp pains.  Fair HEP compliance but helpful when down  .  Current pain level is Current Pain level: 5/10.     Previous pain level was  Initial Pain level: 7/10.   Changes in function:  Yes (See Goal flowsheet attached for changes in current functional level)  Adverse reaction to treatment or activity: None    OBJECTIVE  Changes noted in objective findings: Objective: REIL: NE during, decreased pain after     ASSESSMENT/PLAN  Diagnosis: LBP   DIAGP:  The encounter diagnosis was Chronic bilateral low back pain with right-sided sciatica.  Updated problem list and treatment plan:   Pain - HEP  Decreased ROM/flexibility - HEP  Decreased function - HEP  Decreased strength - HEP  Impaired muscle performance - HEP  Impaired posture - HEP  STG/LTGs have been met or progress has been made towards goals:  Yes, please see goal flowsheet for most current information  Assessment of Progress: current status is unknown.  Last current status:     Self Management Plans:  HEP  I have re-evaluated this patient and find that the nature, scope, duration and intensity of the therapy is appropriate for the medical condition of the patient.  Kailey continues to require the following intervention to meet STG and LTG's:  HEP.    Recommendations:  Discharge with current home program.  Patient to follow up with MD as needed.    Please refer to the daily flowsheet for treatment today, total treatment time and time spent performing 1:1 timed codes.

## 2022-10-01 ENCOUNTER — MYC REFILL (OUTPATIENT)
Dept: FAMILY MEDICINE | Facility: CLINIC | Age: 22
End: 2022-10-01

## 2022-10-01 DIAGNOSIS — F90.9 ATTENTION DEFICIT HYPERACTIVITY DISORDER (ADHD), UNSPECIFIED ADHD TYPE: ICD-10-CM

## 2022-10-03 RX ORDER — METHYLPHENIDATE 1.1 MG/H
1 PATCH TRANSDERMAL DAILY
Qty: 30 PATCH | Refills: 0 | Status: SHIPPED | OUTPATIENT
Start: 2022-10-03 | End: 2022-11-10

## 2022-10-05 DIAGNOSIS — J45.40 MODERATE PERSISTENT ASTHMA WITHOUT COMPLICATION: ICD-10-CM

## 2022-10-06 RX ORDER — FLUTICASONE PROPIONATE AND SALMETEROL 500; 50 UG/1; UG/1
POWDER RESPIRATORY (INHALATION)
Qty: 60 EACH | Refills: 5 | Status: SHIPPED | OUTPATIENT
Start: 2022-10-06 | End: 2023-03-31

## 2022-10-06 NOTE — TELEPHONE ENCOUNTER
Routing refill request to provider for review/approval because:  ACT  ACT and ATAQ Scores  4/6/2021   4/27/2021   2/2/2022   3/23/2022   3/23/2022   7/14/2022   8/26/2022    ACT TOTAL SCORE (Goal Greater than or Equal to 20) 19 20 17 18 18 15 19     Roxy Carvalho, RN, BSN, PHN  [facility]

## 2022-11-10 ENCOUNTER — MYC REFILL (OUTPATIENT)
Dept: FAMILY MEDICINE | Facility: CLINIC | Age: 22
End: 2022-11-10

## 2022-11-10 DIAGNOSIS — F90.9 ATTENTION DEFICIT HYPERACTIVITY DISORDER (ADHD), UNSPECIFIED ADHD TYPE: ICD-10-CM

## 2022-11-10 NOTE — TELEPHONE ENCOUNTER
Routing refill request to provider for review/approval because:  Drug not on the [facility] refill protocol     Mitchell HERNANDEZ RN 11/10/2022 at 2:51 PM

## 2022-11-11 RX ORDER — METHYLPHENIDATE 1.1 MG/H
1 PATCH TRANSDERMAL DAILY
Qty: 30 PATCH | Refills: 0 | Status: SHIPPED | OUTPATIENT
Start: 2022-11-11 | End: 2023-02-24

## 2022-11-20 DIAGNOSIS — M51.26 LUMBAR DISC HERNIATION: ICD-10-CM

## 2022-11-22 NOTE — TELEPHONE ENCOUNTER
Routing refill request to provider for review/approval because:  Labs out of range:  BP  Labs not current:  ALT, AST    BP Readings from Last 3 Encounters:   07/31/22 (!) 121/97   03/23/22 129/79   03/08/22 104/85     Mitchell HERNANDEZ RN 11/22/2022 at 11:00 AM

## 2022-11-23 ENCOUNTER — OFFICE VISIT (OUTPATIENT)
Dept: FAMILY MEDICINE | Facility: CLINIC | Age: 22
End: 2022-11-23
Payer: COMMERCIAL

## 2022-11-23 ENCOUNTER — HOSPITAL ENCOUNTER (EMERGENCY)
Facility: CLINIC | Age: 22
Discharge: LEFT BEFORE DISCHARGE | End: 2022-11-23
Admitting: EMERGENCY MEDICINE
Payer: COMMERCIAL

## 2022-11-23 ENCOUNTER — NURSE TRIAGE (OUTPATIENT)
Dept: NURSING | Facility: CLINIC | Age: 22
End: 2022-11-23

## 2022-11-23 VITALS
TEMPERATURE: 98.1 F | RESPIRATION RATE: 18 BRPM | SYSTOLIC BLOOD PRESSURE: 137 MMHG | DIASTOLIC BLOOD PRESSURE: 84 MMHG | OXYGEN SATURATION: 98 % | HEART RATE: 109 BPM

## 2022-11-23 VITALS
RESPIRATION RATE: 18 BRPM | DIASTOLIC BLOOD PRESSURE: 82 MMHG | SYSTOLIC BLOOD PRESSURE: 114 MMHG | OXYGEN SATURATION: 97 % | HEART RATE: 106 BPM | TEMPERATURE: 98.3 F | BODY MASS INDEX: 23.33 KG/M2 | HEIGHT: 66 IN | WEIGHT: 145.2 LBS

## 2022-11-23 DIAGNOSIS — M35.7 HYPERMOBILITY SYNDROME: ICD-10-CM

## 2022-11-23 PROCEDURE — 90471 IMMUNIZATION ADMIN: CPT | Performed by: PHYSICIAN ASSISTANT

## 2022-11-23 PROCEDURE — 90651 9VHPV VACCINE 2/3 DOSE IM: CPT | Performed by: PHYSICIAN ASSISTANT

## 2022-11-23 PROCEDURE — 90677 PCV20 VACCINE IM: CPT | Performed by: PHYSICIAN ASSISTANT

## 2022-11-23 PROCEDURE — 999N000104 HC STATISTIC NO CHARGE

## 2022-11-23 PROCEDURE — 90686 IIV4 VACC NO PRSV 0.5 ML IM: CPT | Performed by: PHYSICIAN ASSISTANT

## 2022-11-23 PROCEDURE — 90472 IMMUNIZATION ADMIN EACH ADD: CPT | Performed by: PHYSICIAN ASSISTANT

## 2022-11-23 PROCEDURE — 99213 OFFICE O/P EST LOW 20 MIN: CPT | Mod: 25 | Performed by: PHYSICIAN ASSISTANT

## 2022-11-23 PROCEDURE — 93005 ELECTROCARDIOGRAM TRACING: CPT

## 2022-11-23 RX ORDER — NAPROXEN 500 MG/1
TABLET ORAL
Qty: 60 TABLET | Refills: 1 | Status: SHIPPED | OUTPATIENT
Start: 2022-11-23

## 2022-11-23 ASSESSMENT — ASTHMA QUESTIONNAIRES
ACT_TOTALSCORE: 20
QUESTION_2 LAST FOUR WEEKS HOW OFTEN HAVE YOU HAD SHORTNESS OF BREATH: ONCE OR TWICE A WEEK
QUESTION_4 LAST FOUR WEEKS HOW OFTEN HAVE YOU USED YOUR RESCUE INHALER OR NEBULIZER MEDICATION (SUCH AS ALBUTEROL): TWO OR THREE TIMES PER WEEK
QUESTION_5 LAST FOUR WEEKS HOW WOULD YOU RATE YOUR ASTHMA CONTROL: WELL CONTROLLED
QUESTION_3 LAST FOUR WEEKS HOW OFTEN DID YOUR ASTHMA SYMPTOMS (WHEEZING, COUGHING, SHORTNESS OF BREATH, CHEST TIGHTNESS OR PAIN) WAKE YOU UP AT NIGHT OR EARLIER THAN USUAL IN THE MORNING: NOT AT ALL
ACT_TOTALSCORE: 20
QUESTION_1 LAST FOUR WEEKS HOW MUCH OF THE TIME DID YOUR ASTHMA KEEP YOU FROM GETTING AS MUCH DONE AT WORK, SCHOOL OR AT HOME: A LITTLE OF THE TIME
QUESTION_2 LAST FOUR WEEKS HOW OFTEN HAVE YOU HAD SHORTNESS OF BREATH: ONCE OR TWICE A WEEK
QUESTION_5 LAST FOUR WEEKS HOW WOULD YOU RATE YOUR ASTHMA CONTROL: WELL CONTROLLED
QUESTION_1 LAST FOUR WEEKS HOW MUCH OF THE TIME DID YOUR ASTHMA KEEP YOU FROM GETTING AS MUCH DONE AT WORK, SCHOOL OR AT HOME: A LITTLE OF THE TIME
ACT_TOTALSCORE: 20
QUESTION_3 LAST FOUR WEEKS HOW OFTEN DID YOUR ASTHMA SYMPTOMS (WHEEZING, COUGHING, SHORTNESS OF BREATH, CHEST TIGHTNESS OR PAIN) WAKE YOU UP AT NIGHT OR EARLIER THAN USUAL IN THE MORNING: NOT AT ALL
QUESTION_4 LAST FOUR WEEKS HOW OFTEN HAVE YOU USED YOUR RESCUE INHALER OR NEBULIZER MEDICATION (SUCH AS ALBUTEROL): TWO OR THREE TIMES PER WEEK

## 2022-11-23 NOTE — TELEPHONE ENCOUNTER
"Triage Call:    Caller: Patient  Was seen in clinic today and she got some vaccines today.  She has been feeling fatigued and also just vomited.   She verbalized that she is having trouble breathing and is having chest pain.  This started this afternoon within a few hours of having her immunizations.     Protocol Recommended Disposition: Call 911.  Patient is concerned about the costs and states \"I will just figure it out on my own\".  Advised again that she needs to call 911, as this is very serious for a possibly anaphylactic reaction and EMS can evaluate her and if they don't transport her then there is no charge.    Patient verbalized understanding, but didn't committ to calling 911 or going to the ED.      Caller verbalized understanding of instructions and questions answered.      Zoila Pompa RN on 11/23/2022 at 5:57 PM        Reason for Disposition    [1] Difficulty breathing or swallowing AND [2] starts within 2 hours after injection    Protocols used: IMMUNIZATION [id number]      "

## 2022-11-23 NOTE — PROGRESS NOTES
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 She would like to see rheumatology again. Today her symptoms are likely related to repeated movement of the 4th digit extensor tendon across the MCP joint. Also could have some trigger point finger contributing to pain in the area (jean claude notes occasionally that digit will get stuck).  We will try a finger splint.   Follow up with rheumatology.  - Adult Rheumatology  Referral; Future    Review of external notes as documented elsewhere in note    Shahrzad Miranda PA-C  [facility] CHIP Fang is a 22 year old, presenting for the following health issues:  No chief complaint on file.      Musculoskeletal Problem    History of Present Illness       Reason for visit:  Hand pain  Symptom onset:  3-7 days ago    Annalisa Argueta eats 0-1 servings of fruits and vegetables daily.Annalisa Argueta consumes 2 sweetened beverage(s) daily.Annalisa Argueta exercises with enough effort to increase Annalisa Argueta's heart rate 10 to 19 minutes per day.  Annalisa Argueta exercises with enough effort to increase Annalisa Argueta's heart rate 4 days per week. Annalisa Argueta is missing 2 dose(s) of medications per week.       Pain History:  When did you first notice your pain? - Acute Pain   Have you seen anyone else for your pain? No  Where in your body do you have pain? Musculoskeletal problem/pain  Onset/Duration: 2 weeks  Description  Location: fingers - left 4th digit (tendon moving across the bone, visible) She has been able to do this for a long time but recently has been causing more pain  Pain in the 4th MCP joint  Joint Swelling: No  Redness: No  Pain: YES  Warmth: YES  Intensity:  moderate  Progression of Symptoms:  worsening  Accompanying signs and symptoms:   Fevers: No  Numbness/tingling/weakness: YES- numbness,weakness and tingling (potentially related  "to herniated disk)  Some numbness and tingling which seems to come from the left shoulder and down the arm to the hand. At times has less sensation in the left arm  History  Trauma to the area: No  Recent illness:  No  Previous similar problem: YES,all life but just started hurting  Previous evaluation:  No  Precipitating or alleviating factors:  Aggravating factors include: moving,stretching  Therapies tried and outcome: NSAID - naproxen     She notes she often has mobility in the joints. Has had soft tissue injuries to the ankles (often sprained ankles)       Review of Systems   GENERAL:  No fevers  MUSCULOSKELETAL: As noted in HPI          Objective    /83 (BP Location: Right arm, Patient Position: Chair, Cuff Size: Adult Regular)   Pulse 106   Temp 98.3  F (36.8  C) (Oral)   Resp 18   Ht 1.676 m (5' 6\")   Wt 65.9 kg (145 lb 3.2 oz)   SpO2 97%   BMI 23.44 kg/m    Body mass index is 23.44 kg/m .  Physical Exam   GENERAL: No acute distress  HEENT: Normocephalic  VASCULAR: 2+ left radial pulse  EXTREMITIES:  Right upper extremity: Negative Phalan's.  Left upper extremity: Full range of motion at the wrist (able to flex and extend, deviate to ulnar and radial sides). Tenderness over the 4th MCP joint. Dorsal tendon with movement across MCP joint with flexion and extension of the 4th digit. Negative Tinel's, Negative Phalan's.  NEURO: Alert, non-focal              "

## 2022-11-24 NOTE — ED TRIAGE NOTES
Patient states she feels SOB and is having chest pain, patient states it hurts to take a deep breath. Patient reports she received 3 vaccines this morning and began feeling ill about 2 hours after they were given.    Triage Assessment     Row Name 11/23/22 [id number]       Triage Assessment (Adult)    Airway WDL WDL       Respiratory WDL    Respiratory WDL WDL       Skin Circulation/Temperature WDL    Skin Circulation/Temperature WDL WDL       Cardiac WDL    Cardiac WDL WDL       Peripheral/Neurovascular WDL    Peripheral Neurovascular WDL WDL       Cognitive/Neuro/Behavioral WDL    Cognitive/Neuro/Behavioral WDL WDL

## 2022-11-25 LAB
ATRIAL RATE - MUSE: 94 BPM
DIASTOLIC BLOOD PRESSURE - MUSE: NORMAL MMHG
INTERPRETATION ECG - MUSE: NORMAL
P AXIS - MUSE: 63 DEGREES
PR INTERVAL - MUSE: 134 MS
QRS DURATION - MUSE: 80 MS
QT - MUSE: 352 MS
QTC - MUSE: 440 MS
R AXIS - MUSE: 59 DEGREES
SYSTOLIC BLOOD PRESSURE - MUSE: NORMAL MMHG
T AXIS - MUSE: 30 DEGREES
VENTRICULAR RATE- MUSE: 94 BPM

## 2022-11-25 NOTE — TELEPHONE ENCOUNTER
Pt went to ED and was evaluated.   -No follow up is needed.     Roxy Carvalho, RN, BSN, PHN  [facility]

## 2022-12-07 ENCOUNTER — NURSE TRIAGE (OUTPATIENT)
Dept: NURSING | Facility: CLINIC | Age: 22
End: 2022-12-07

## 2022-12-07 NOTE — TELEPHONE ENCOUNTER
"Nurse Triage SBAR    Is this a 2nd Level Triage? NO    Situation:  Pt with severe back pain. Left leg numbness, tingling, weakness. Unable to bear weight. Pt is crying and stated that she is \"falling to the ground\". Unable to walk at all.    Background: Patient,Annalisa, gala. Consent: not needed.    Assessment:  Unable to feel her left leg when she coughs, bends over, turns to rotate her hips. Unable to bear any weight on her foot. History of back pain, right leg numbness, weakness, with a herniated disc.  History of loss of feeling in her toes in her right leg long term and in her left foot, x 1 week. Numbness and tingling extends up her left leg up to her calf. Left buttock is very painful.   Lower back is stiff and painful as well.   Was in the ED with chest pain and difficulty breathing 2 days ago and she left after waiting for 6 hours, without being seen. Refusing to be seen in the ED again.   Pt stated that she was being seen by PT in the past (August) for lower back pain, and right sided numbness and tintingl    Protocol Recommended Disposition: Call 911 or ED now.    Recommendation: According to the protocol, Patient should Call 911. Advised Patient to Call 911. She stated that she knows it is from her back and stated that she will go to the ED now, but refuses to contact 911. She stated that if symptoms worsen or spread, then she will contact 911. Care advice given. Patient verbalizes understanding and agrees with plan of care. Reviewed concerning symptoms and when to call back and patient verbalized understanding and agreed to follow care advice given.       Michelle Dickson RN  [facility] Nurse Advisor  12/7/2022 at 12:02 PM         Reason for Disposition    New neurologic deficit that is present NOW, sudden onset of ANY of the following: * Weakness of the face, arm, or leg on one side of the body* Numbness of the face, arm, or leg on one side of the body* Loss of speech or garbled speech    Weakness " of a leg or foot (e.g., unable to bear weight, dragging foot)    Additional Information    Negative: Difficult to awaken or acting confused (e.g., disoriented, slurred speech)    Negative: Passed out (i.e., fainted, collapsed and was not responding)    Negative: Shock suspected (e.g., cold/pale/clammy skin, too weak to stand, low BP, rapid pulse)    Negative: Sounds like a life-threatening emergency to the triager    Negative: Major injury to the back (e.g., MVA, fall > 10 feet or 3 meters, penetrating injury, etc.)    Negative: Pain in the upper back over the ribs (rib cage) that radiates (travels) into the chest    Negative: Pain in the upper back over the ribs (rib cage) and worsened by coughing (or clearly increases with breathing)    Negative: Back pain during pregnancy    Negative: SEVERE back pain of sudden onset and age > 60 years    Negative: SEVERE abdominal pain (e.g., excruciating)    Negative: Abdominal pain and age > 60 years    Negative: Unable to urinate (or only a few drops) and bladder feels very full    Negative: Loss of bladder or bowel control (urine or bowel incontinence; wetting self, leaking stool) of new-onset    Negative: Numbness (loss of sensation) in groin or rectal area    Negative: Pain radiates into groin, scrotum    Negative: Blood in urine (red, pink, or tea-colored)    Negative: Vomiting and pain over lower ribs of back (i.e., flank - kidney area)    Protocols used: NEUROLOGIC DEFICIT-A-OH, BACK PAIN-A-OH

## 2023-01-31 ENCOUNTER — OFFICE VISIT (OUTPATIENT)
Dept: RHEUMATOLOGY | Facility: CLINIC | Age: 23
End: 2023-01-31
Payer: COMMERCIAL

## 2023-01-31 VITALS
DIASTOLIC BLOOD PRESSURE: 70 MMHG | HEART RATE: 92 BPM | BODY MASS INDEX: 25.1 KG/M2 | WEIGHT: 155.5 LBS | SYSTOLIC BLOOD PRESSURE: 128 MMHG

## 2023-01-31 DIAGNOSIS — F39 MOOD DISORDER (H): ICD-10-CM

## 2023-01-31 DIAGNOSIS — M35.7 HYPERMOBILITY SYNDROME: ICD-10-CM

## 2023-01-31 DIAGNOSIS — M25.50 MULTIPLE JOINT PAIN: Primary | ICD-10-CM

## 2023-01-31 PROCEDURE — 99214 OFFICE O/P EST MOD 30 MIN: CPT | Performed by: INTERNAL MEDICINE

## 2023-01-31 NOTE — PROGRESS NOTES
Kailey was seen today for recheck.    Diagnoses and all orders for this visit:    Multiple joint pain    Hypermobility syndrome  -     Adult Rheumatology  Referral    Mood disorder (H)        Rheumatology follow-up visit note     Kailey is a 22 year old adult presents today for follow-up.        This patient has ongoing polyarthralgias without evidence of inflammatory joint disease or connective tissue disease, she has this in the background of hypermobility, this is reviewed, besides the strengthening physical therapy 1 option that she may want to consider it is duloxetine given her prior psychiatric history and use of various antidepressants I have asked her to consult with her primary physician if duloxetine would be suitable.  She will continue to follow-up in her PCP office.    HPI    Kailey Argueta is a 22 year old adult is here for follow-up of arthralgias, she was last seen here in November 2021, at that time we had discussed however hypermobility syndrome can be associated with polyarthralgias, she has noted some response to naproxen, however some of the more significant pains experience of the buttock area on the right side, she has been to physical therapy off and on.  She dates it to her childhood, she noted that the worst of the pains are in her shoulders hips and ankles especially in the hip and ankle area, now that she is working full-time 40 hours a week of being on her feet causes the pain to get worse, he has not observed swelling.  She has not experienced pain in her feet, in her hands.  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  Once that happens it can be painful for several hours thereafter.  She finds Voltaren gel is of help.  She reports no personal or family history that she is aware of psoriasis ulcerative colitis or Crohn's disease.  Her work-up at her primary physician's office includes a rheumatoid factor anti-CCP antibody and CHERRI which were all  normal.  Her acute phase response including sedimentation rate, CRP were also normal.  She has taken over-the-counter nonsteroidals with some help.  She is is a smoker, she works at a  [employer], she noted overall pain level to be 7.0/10 morning stiffness is minimal at 10 minutes.  She has noted fatigue generalized weakness, she has noted headaches easy bruising muscle spasms.  She has difficult time sleeping.  She has not never been pregnant, no history of DVT PE, no history of seizure disorder that she is aware of.         DETAILED EXAMINATION  01/31/23  :    Vitals:    01/31/23 1036   BP: 128/70   Pulse: 92   Weight: 70.5 kg (155 lb 8 oz)     Alert oriented. Head including the face is examined for malar rash, heliotropes, scarring, lupus pernio. Eyes examined for redness such as in episcleritis/scleritis, periorbital lesions.   Neck/ Face examined for parotid gland swelling, range of motion of neck.  Left upper and lower and right upper and lower extremities examined for tenderness, swelling, warmth of the appendicular joints, range of motion, edema, rash.  Some of the important findings included: No evidence of synovitis in the palpable joints of the upper extremities.  No significant deformities of the digits.  Once again signals of hypermobility such as able to touch her thumb to the distal forearm, hyperextension at the fifth digit exaggerated hyperextension at the elbow joints.     Patient Active Problem List    Diagnosis Date Noted     Hypermobility syndrome 11/23/2022     Priority: Medium     Chronic bilateral low back pain with right-sided sciatica 08/09/2022     Priority: Medium     Autism spectrum 01/20/2022     Priority: Medium     Cannabis dependence (H) 05/10/2021     Priority: Medium     Moderate persistent asthma without complication 12/16/2020     Priority: Medium     Dysfunctional uterine bleeding 12/16/2020     Priority: Medium     Tobacco use disorder 12/16/2020     Priority: Medium      Elevated blood pressure reading without diagnosis of hypertension 12/16/2020     Priority: Medium     Mood disorder (H) 06/29/2020     Priority: Medium     Gastroesophageal reflux disease with esophagitis 06/29/2020     Priority: Medium     Borderline personality disorder (H) 06/25/2020     Priority: Medium     Posttraumatic stress disorder 06/25/2020     Priority: Medium     Adjustment disorder with depressed mood 06/22/2020     Priority: Medium     Cannabis abuse 06/22/2020     Priority: Medium     History of homicidal ideation 06/22/2020     Priority: Medium     Suicidal thoughts 06/22/2020     Priority: Medium     Abdominal pain, chronic, bilateral lower quadrant 06/21/2020     Priority: Medium     Slow transit constipation 06/21/2020     Priority: Medium     Migraine without status migrainosus, not intractable, unspecified migraine type 05/15/2017     Priority: Medium     Panic attack 06/29/2016     Priority: Medium     CARDIOVASCULAR SCREENING; LDL GOAL LESS THAN 130 05/31/2016     Priority: Medium     ADHD (attention deficit hyperactivity disorder) 07/01/2004     Priority: Medium     Past Surgical History:   Procedure Laterality Date     AS INDUCED ABORTN BY D&C  01/2019    16 weeks. PP     DILATION AND CURETTAGE  01/2019      Past Medical History:   Diagnosis Date     ADHD      ADHD (attention deficit hyperactivity disorder)      Anxiety      Anxiety      Asthma     believes she has asthma     Bipolar 1 disorder (H)      Bipolar 1 disorder (H)      Headache      Hypertension      Hypertension      IUD contraception 01/2019    post ab     Allergies   Allergen Reactions     Kiwi Other (See Comments)     Mild throat swelling per pt; able to have artificially sweetened kiwi flavor     Current Outpatient Medications   Medication Sig Dispense Refill     acetaminophen (TYLENOL) 500 MG tablet Take 500-1,000 mg by mouth every 6 hours as needed for mild pain       albuterol (PROAIR HFA/PROVENTIL HFA/VENTOLIN HFA) 108  (90 Base) MCG/ACT inhaler INHALE 1-2 PUFFS BY MOUTH EVERY 4 HOURS AS NEEDED FOR SHORTNESS OF BREATH / DYSPNEA OR WHEEZING 18 g 1     diclofenac (VOLTAREN) 1 % topical gel Apply 4 g topically 4 times daily as needed for moderate pain 350 g 0     fluticasone-salmeterol (WIXELA INHUB) 500-50 MCG/ACT inhaler INHALE 1 PUFF INTO THE LUNGS EVERY 12 HOURS. 60 each 5     gabapentin (NEURONTIN) 300 MG capsule Take 300 mg by mouth 2 times daily       hydrOXYzine (VISTARIL) 50 MG capsule Take 50 mg by mouth       methylphenidate (DAYTRANA) 10 MG/9HR patch Place 1 patch onto the skin daily wear patch for 9 hours only each day 30 patch 0     naproxen (NAPROSYN) 500 MG tablet TAKE 1 TABLET (500 MG) BY MOUTH 2 TIMES DAILY (WITH MEALS) AS NEEDED FOR PAIN 60 tablet 1     QUEtiapine (SEROQUEL) 50 MG tablet TAKE 1 TABLET BY MOUTH EVERYDAY AT BEDTIME 90 tablet 1     family history includes Crohn's Disease in Annalisa Argueta's maternal grandfather; Diabetes in Annalisa Argueta's father and mother; Endometriosis in Annalisa Argueta's sister; Hypertension in Annalisa Argueta's mother; Skin Cancer in Annalisa Argueta's mother.  Social Connections: Not on file          WBC   Date Value Ref Range Status   12/16/2020 11.4 (H) 4.0 - 11.0 10e9/L Final     Comment:     Results confirmed by repeat test     WBC Count   Date Value Ref Range Status   07/31/2022 8.1 4.0 - 11.0 10e3/uL Final     RBC Count   Date Value Ref Range Status   07/31/2022 4.96 3.80 - 5.90 10e6/uL Final     Comment:     Reference Range:                                                     Female 3.80-5.20 10e6/uL                                      Male 4.40-5.90 10e6u/L   12/16/2020 5.26 (H) 3.8 - 5.2 10e12/L Final     Comment:     Results confirmed by repeat test     Hemoglobin   Date Value Ref Range Status   07/31/2022 15.2 11.7 - 17.7 g/dL Final     Comment:     Reference Range:                                                     Female 11.7-15.7 g/dL                                       Male 13.3-17.7 g/dL   12/16/2020 16.0 (H) 11.7 - 15.7 g/dL Final     Comment:     Results confirmed by repeat test     Hematocrit   Date Value Ref Range Status   07/31/2022 45.6 35.0 - 53.0 % Final     Comment:     Reference Range:                                                     Female 35.0-47.0 %                                            Male 40.0-54.0 %   12/16/2020 45.9 35.0 - 47.0 % Final     MCV   Date Value Ref Range Status   07/31/2022 92 78 - 100 fL Final   12/16/2020 87 78 - 100 fl Final     MCH   Date Value Ref Range Status   07/31/2022 30.6 26.5 - 33.0 pg Final   12/16/2020 30.4 26.5 - 33.0 pg Final     Platelet Count   Date Value Ref Range Status   07/31/2022 241 150 - 450 10e3/uL Final   12/16/2020 262 150 - 450 10e9/L Final     % Lymphocytes   Date Value Ref Range Status   07/31/2022 46 % Final   09/22/2020 42.4 % Final     AST   Date Value Ref Range Status   12/16/2020 16 0 - 45 U/L Final     ALT   Date Value Ref Range Status   12/16/2020 27 0 - 50 U/L Final     Albumin   Date Value Ref Range Status   12/16/2020 4.0 3.4 - 5.0 g/dL Final     Alkaline Phosphatase   Date Value Ref Range Status   12/16/2020 78 40 - 150 U/L Final     Creatinine   Date Value Ref Range Status   07/31/2022 0.67 0.51 - 1.17 mg/dL Final     Comment:     Male and Female  0-2 Months    0.31-0.88 mg/dL  2-12 Months   0.16-0.39 mg/dL  1-2 Years     0.18-0.35 mg/dL  3-4 Years     0.26-0.42 mg/dL  5-6 Years     0.29-0.47 mg/dL  7-8 Years     0.34-0.53 mg/dL  9-10 Years    0.33-0.64 mg/dL  11-12 Years   0.44-0.68 mg/dL  13-14 Years   0.46-0.77 mg/dL    Female  15 Years and older  0.51-0.95 mg/dL    Male  15 Years and older  0.67-1.17 mg/dL       12/16/2020 0.66 0.52 - 1.04 mg/dL Final     GFR Estimate   Date Value Ref Range Status   07/31/2022 >90 >60 mL/min/1.73m2 Final     Comment:     GFR not calculated when sex unspecified or nonbinary.  Effective December 21, 2021 eGFRcr in adults is calculated using the 2021 CKD-EPI  creatinine equation which includes age and gender (Dilshad mandujano al., NEJM, DOI: 10.1056/GQHBnk0775709)   12/16/2020 >90 >60 mL/min/[1.73_m2] Final     Comment:     Non  GFR Calc  Starting 12/18/2018, serum creatinine based estimated GFR (eGFR) will be   calculated using the Chronic Kidney Disease Epidemiology Collaboration   (CKD-EPI) equation.       GFR Estimate If Black   Date Value Ref Range Status   12/16/2020 >90 >60 mL/min/[1.73_m2] Final     Comment:      GFR Calc  Starting 12/18/2018, serum creatinine based estimated GFR (eGFR) will be   calculated using the Chronic Kidney Disease Epidemiology Collaboration   (CKD-EPI) equation.       Erythrocyte Sedimentation Rate   Date Value Ref Range Status   07/13/2021 5 0 - 20 mm/hr Final     Comment:     Sex Specific Reference Ranges:     Female  0-20  mm/hr   Male      0-15  mm/hr       CRP Inflammation   Date Value Ref Range Status   07/13/2021 <2.9 0.0 - 8.0 mg/L Final

## 2023-02-10 ENCOUNTER — PATIENT OUTREACH (OUTPATIENT)
Dept: FAMILY MEDICINE | Facility: CLINIC | Age: 23
End: 2023-02-10
Payer: COMMERCIAL

## 2023-02-10 NOTE — TELEPHONE ENCOUNTER
- SB 3 PAL Welcome Letter Sent    - MYC sent concerning appointment request for medication check per Dr. Kim Garcia's request from appointment 8/26/2023    Gilbert Bourne - Patient Advocate Liaison - PAL RN  [facility]  [phone]

## 2023-02-10 NOTE — LETTER
Thank you for choosing [facility] today for your health care needs.     [facility] is transforming primary care  At [facility], we re dedicated to constantly improve how we serve the health care needs of our patients and communities. We re currently making changes to the way we deliver care.     Changes you ll notice include:    An emphasis on building a relationship with a primary care provider    Access to a PAL (personal advocate and liaison) to help guide you with your care needs    Appointment lengths tailored to your specific needs and greater access to a care team to help you and your provider improve and maintain your health and well-being    Improved online access to your care team    Benefits of a primary care provider  If you don t have a designated primary care provider, we encourage you to get to know our care team online and find a provider you d like to see. Most of our providers have a short video on their online provider page. Visit [email or web address] to explore our providers and locations.    Benefits of having a primary care provider include:      They get to know you - your health history, family history and goals, making it easier to make a health plan together.     You get to know them - making health-related conversations and decisions easier      Primary care doctors help you when you re sick or hurt - but also focus on keeping you healthy with preventive care and screenings.      A doctor who sees you regularly is more likely to notice changes in your health.     You ll be connected to a broad care team who partners with your provider to support you.    Patient Advocate Liaison (PAL)   To help make sure you get the right care, at the right time, we include PALs, or Patient Advocate Liaisons, as part of your care team. Your PAL will be your first line of contact. They ll advocate for your needs and help you navigate our services, connecting you with care team members and  community resources to ensure your care is well coordinated. You ll be introduced to a PAL in an upcoming visit.     Expanded care team access with tailored appointment lengths  Depending on your health care needs, you may have longer or shorter appointments and see additional care team providers - including Medication Therapy Management (MTM) pharmacists, diabetes educators, behavioral health clinicians, or social workers. At times, they may be included in your visit with your provider, or you may see them individually.     Online access to your health care records and care team  [employer] is our online tool that makes it easy to see your health care information and communicate with your care team.     [employer] allows you to:     View your health maintenance plan so you know when you re due for a preventive screening    Send secure messages to your care team    View your health history and visit summaries     Schedule appointments     Complete questionnaires and eCheck-in before appointments      Get care from your provider with an e-visit      View and pay your bill     Sign up at [email or web address]. Once you have an account, you also can download the mobile efrem.     Connecting to fast and convenient care  When you need fast, convenient care - consider one of the following options:       Video Visit: A convenient care option for visiting with your provider out of the comfort of your own home. Most of the things you come to the clinic to address with your provider can now be done virtually through a video. This includes your chronic medication follow up, questions or concerns you may have, and even your annual Medicare Wellness Visit.       Phone Visit: Another convenient option for follow up of common problems that may require a more in-depth discussion with your provider.       E-visit: When you need acute care quickly, or have a quick question about your medication, an E-visit is completed through [employer] and  your provider will respond within one business day.      Gilbert MAURICIO RN - Patient Advocate Liaison (PAL)  [facility]  [phone]

## 2023-02-23 ENCOUNTER — PATIENT OUTREACH (OUTPATIENT)
Dept: FAMILY MEDICINE | Facility: CLINIC | Age: 23
End: 2023-02-23
Payer: COMMERCIAL

## 2023-02-23 NOTE — TELEPHONE ENCOUNTER
- Called patient, scheduled med check and preventative   - Advised of care gaps and upcoming HM for PAP.    Health Maintenance Due   Topic Date Due     YEARLY PREVENTIVE VISIT  Never done     ADVANCE CARE PLANNING  Never done     HEPATITIS B IMMUNIZATION (1 of 3 - 3-dose series) Never done     COVID-19 Vaccine (3 - Booster for Lillian series) 03/09/2022     ASTHMA ACTION PLAN  04/06/2022     NICOTINE/TOBACCO CESSATION COUNSELING Q 1 YR  03/11/2023     ANNUAL REVIEW OF HM ORDERS  03/11/2023     PAP  07/28/2023      Gilbert Bourne RN - Patient Advocate Liaison (PAL)  [facility]  [phone]

## 2023-02-24 ENCOUNTER — VIRTUAL VISIT (OUTPATIENT)
Dept: FAMILY MEDICINE | Facility: CLINIC | Age: 23
End: 2023-02-24
Payer: COMMERCIAL

## 2023-02-24 DIAGNOSIS — F90.9 ATTENTION DEFICIT HYPERACTIVITY DISORDER (ADHD), UNSPECIFIED ADHD TYPE: Primary | ICD-10-CM

## 2023-02-24 DIAGNOSIS — F12.20 CANNABIS DEPENDENCE (H): ICD-10-CM

## 2023-02-24 DIAGNOSIS — F33.0 MILD EPISODE OF RECURRENT MAJOR DEPRESSIVE DISORDER (H): ICD-10-CM

## 2023-02-24 DIAGNOSIS — F17.200 TOBACCO USE DISORDER: ICD-10-CM

## 2023-02-24 PROBLEM — F33.1 MODERATE EPISODE OF RECURRENT MAJOR DEPRESSIVE DISORDER (H): Status: ACTIVE | Noted: 2020-06-22

## 2023-02-24 PROCEDURE — 99213 OFFICE O/P EST LOW 20 MIN: CPT | Mod: VID | Performed by: FAMILY MEDICINE

## 2023-02-24 RX ORDER — METHYLPHENIDATE 1.1 MG/H
1 PATCH TRANSDERMAL DAILY
Qty: 30 PATCH | Refills: 0 | Status: SHIPPED | OUTPATIENT
Start: 2023-02-24 | End: 2023-08-02

## 2023-02-24 ASSESSMENT — ANXIETY QUESTIONNAIRES
1. FEELING NERVOUS, ANXIOUS, OR ON EDGE: NOT AT ALL
7. FEELING AFRAID AS IF SOMETHING AWFUL MIGHT HAPPEN: NOT AT ALL
GAD7 TOTAL SCORE: 5
2. NOT BEING ABLE TO STOP OR CONTROL WORRYING: NOT AT ALL
3. WORRYING TOO MUCH ABOUT DIFFERENT THINGS: NOT AT ALL
GAD7 TOTAL SCORE: 5
IF YOU CHECKED OFF ANY PROBLEMS ON THIS QUESTIONNAIRE, HOW DIFFICULT HAVE THESE PROBLEMS MADE IT FOR YOU TO DO YOUR WORK, TAKE CARE OF THINGS AT HOME, OR GET ALONG WITH OTHER PEOPLE: SOMEWHAT DIFFICULT
5. BEING SO RESTLESS THAT IT IS HARD TO SIT STILL: SEVERAL DAYS
6. BECOMING EASILY ANNOYED OR IRRITABLE: NEARLY EVERY DAY

## 2023-02-24 ASSESSMENT — PATIENT HEALTH QUESTIONNAIRE - PHQ9
SUM OF ALL RESPONSES TO PHQ QUESTIONS 1-9: 11
5. POOR APPETITE OR OVEREATING: SEVERAL DAYS

## 2023-02-24 NOTE — PROGRESS NOTES
Annalisa is a 23 year old who is being evaluated via a billable video visit.      How would you like to obtain your AVS? MyChart  If the video visit is dropped, the invitation should be resent by: Text to cell phone: [phone]  Will anyone else be joining your video visit? No        Assessment & Plan     Attention deficit hyperactivity disorder (ADHD), unspecified ADHD type  Dose is currently working well for her, so we will continue.  Follow up in 6 months or sooner as needed.  - methylphenidate (DAYTRANA) 10 MG/9HR patch; Place 1 patch onto the skin daily wear patch for 9 hours only each day    Mild episode of recurrent major depressive disorder (H)  Patient feels there is a seasonal component to her depression, would like to give it more time before we consider medication adjustments.    Tobacco use disorder  Patient is vaping more now. Discussed that Vaping in not better or safer than smoking cigarettes, encourage cessation.    Cannabis dependence (H)  Continues to use regularly for pain, encourage cessation.        12 minutes spent on the date of the encounter doing chart review, history and exam, documentation and further activities per the note       Nicotine/Tobacco Cessation:  Annalisa Argueta reports that Annalisa Argueta has been smoking cigarettes. Annalisa Argueta has been smoking an average of .45 packs per day. Annalisa Argueta has never used smokeless tobacco.  Nicotine/Tobacco Cessation Plan:   Information offered: Patient not interested at this time      Depression Screening Follow Up    PHQ 2/24/2023   PHQ-9 Total Score 11   Q9: Thoughts of better off dead/self-harm past 2 weeks Several days   F/U: Thoughts of suicide or self-harm -   F/U: Self harm-plan -   F/U: Self-harm action -   F/U: Safety concerns -       Follow Up  Follow Up Actions Taken  Crisis resource information provided in the After Visit Summary    Discussed the following ways the patient can remain in a safe environment:  be around others  See  "Patient Instructions    Return in about 6 months (around 8/24/2023) for ADHD Medication Recheck.    Zohra Garcia MD  [facility] YASMIN Fang is a 23 year old, presenting for the following health issues:  A.D.H.D and Recheck Medication      HPI   ADHD Follow-Up (Adult)    Concerns: None  Changes since last visit: Stable  Taking controlled (daily) medications as prescribed: Yes  Sleep: restless sleep and hard time waking up fully rested.  Adult ADHD Self-Reporting form (printed) given to patient?:  No  Currently in counseling: No    Medication Benefits:   Controlled symptoms: Hyperactivity - motor restlessness, Attention span, Distractability, Finishing tasks, Impulse control, Frustration tolerance and Accepting limits  Uncontrolled symptoms:  None    Medication Side Effects:  Reports:  Nauseous if she does not take patch off when she's supposed to.  Sleep Problems? Yes Details: Pt does not feel fully rested when waking up  ++++++++++++++++++++++++++++++++++++++++++++++++    Employer Concerns/Feedback: None  Coworker Concerns:   None  Home/Family Concerns: None    Medication Followup of : Methylphenidate    Taking Medication as prescribed: yes    Side Effects:  None    Medication Helping Symptoms:  yes    Feels that things are working well for her. She does forget to take the patch on occasion, and she can definitely feel the difference in focus on those days.  She thinks her seasonal depression    Review of Systems   Constitutional, HEENT, cardiovascular, pulmonary, gi and gu systems are negative, except as otherwise noted.      Objective    Vitals - Patient Reported  Weight (Patient Reported): 67.1 kg (148 lb)  Height (Patient Reported): 170.2 cm (5' 7\")  BMI (Based on Pt Reported Ht/Wt): 23.18  Pain Score: No Pain (1)  Pain Loc: Shoulder      Vitals:  No vitals were obtained today due to virtual visit.    Physical Exam   GENERAL: Healthy, alert and no distress  EYES: Eyes " grossly normal to inspection.  No discharge or erythema, or obvious scleral/conjunctival abnormalities.  RESP: No audible wheeze, cough, or visible cyanosis.  No visible retractions or increased work of breathing.    SKIN: Visible skin clear. No significant rash, abnormal pigmentation or lesions.  NEURO: Cranial nerves grossly intact.  Mentation and speech appropriate for age.  PSYCH: Mentation appears normal, affect normal/bright, judgement and insight intact, normal speech and appearance well-groomed.            Video-Visit Details    Type of service:  Video Visit   Video Start Time: 9:08 am  Video End Time:9:14 AM    Originating Location (pt. Location): Home  Distant Location (provider location):  Off-site  Platform used for Video Visit: SmartCare system

## 2023-02-28 ENCOUNTER — PATIENT OUTREACH (OUTPATIENT)
Dept: FAMILY MEDICINE | Facility: CLINIC | Age: 23
End: 2023-02-28
Payer: COMMERCIAL

## 2023-02-28 NOTE — TELEPHONE ENCOUNTER
Patient is scheduled for follow-up and physical 5/2/2023. Will follow-up with patient in May to see next instructions from PCP.    Gilbert Bourne RN - Patient Advocate Liaison (PAL)  [facility]  [phone]

## 2023-03-12 ENCOUNTER — NURSE TRIAGE (OUTPATIENT)
Dept: NURSING | Facility: CLINIC | Age: 23
End: 2023-03-12
Payer: COMMERCIAL

## 2023-03-12 NOTE — TELEPHONE ENCOUNTER
"Triage call:    Patient calling to report having intermittent sharp, stabbing abd pain in her RLQ.  The patient reports she has had this pain before & was seen for in it in prev years.  Also, the patient reports being \"moderately nauseous\" & denies having diarrhea.      The patient reports her abd pain has lasted for 8 hours now.  The patient reports that when the pain comes she would rate it a 8-10/10.  The patient reports she has to stop what she is doing when the pain comes.    Per protocol, it is advised that the patient go to the ED for further evaluation & treatment.  Patient agrees w/ disposition.    Krysta Melendrez RN on 3/12/2023 at 12:56 PM    Reason for Disposition    SEVERE abdominal pain (e.g., excruciating)    Additional Information    Negative: Passed out (i.e., fainted, collapsed and was not responding)    Negative: Shock suspected (e.g., cold/pale/clammy skin, too weak to stand, low BP, rapid pulse)    Negative: Sounds like a life-threatening emergency to the triager    Protocols used: ABDOMINAL PAIN - FEMALE-A-OH      "

## 2023-03-31 ENCOUNTER — VIRTUAL VISIT (OUTPATIENT)
Dept: FAMILY MEDICINE | Facility: CLINIC | Age: 23
End: 2023-03-31
Attending: FAMILY MEDICINE
Payer: COMMERCIAL

## 2023-03-31 DIAGNOSIS — R11.0 NAUSEA: ICD-10-CM

## 2023-03-31 DIAGNOSIS — J45.40 MODERATE PERSISTENT ASTHMA WITHOUT COMPLICATION: Primary | ICD-10-CM

## 2023-03-31 DIAGNOSIS — F90.9 ATTENTION DEFICIT HYPERACTIVITY DISORDER (ADHD), UNSPECIFIED ADHD TYPE: ICD-10-CM

## 2023-03-31 PROCEDURE — 99214 OFFICE O/P EST MOD 30 MIN: CPT | Mod: VID | Performed by: FAMILY MEDICINE

## 2023-03-31 RX ORDER — FLUTICASONE PROPIONATE AND SALMETEROL 500; 50 UG/1; UG/1
1 POWDER RESPIRATORY (INHALATION) EVERY 12 HOURS
Qty: 60 EACH | Refills: 5 | Status: SHIPPED | OUTPATIENT
Start: 2023-03-31 | End: 2023-08-02

## 2023-03-31 ASSESSMENT — PATIENT HEALTH QUESTIONNAIRE - PHQ9
10. IF YOU CHECKED OFF ANY PROBLEMS, HOW DIFFICULT HAVE THESE PROBLEMS MADE IT FOR YOU TO DO YOUR WORK, TAKE CARE OF THINGS AT HOME, OR GET ALONG WITH OTHER PEOPLE: SOMEWHAT DIFFICULT
SUM OF ALL RESPONSES TO PHQ QUESTIONS 1-9: 3
SUM OF ALL RESPONSES TO PHQ QUESTIONS 1-9: 3

## 2023-03-31 NOTE — PROGRESS NOTES
Annalisa is a 23 year old who is being evaluated via a billable video visit.      How would you like to obtain your AVS? MyChart  If the video visit is dropped, the invitation should be resent by: Text to cell phone: [phone]  Will anyone else be joining your video visit? No        Assessment & Plan     Moderate persistent asthma without complication  Overall well controlled.  Continue current dose.  If symptoms worsening, consider addition of Montelukast given allergy history.  - fluticasone-salmeterol (WIXELA INHUB) 500-50 MCG/ACT inhaler; Inhale 1 puff into the lungs every 12 hours    Attention deficit hyperactivity disorder (ADHD), unspecified ADHD type  Well controlled with current dose, not wearing off too soon.  She would like to continue the current dose.  Follow up in 6 months or sooner as needed.  Meds to be filled when needed.    Nausea  Resolved, work note provided      15 minutes spent by me on the date of the encounter doing chart review, history and exam, documentation and further activities per the note       See Patient Instructions    Zohra Garcia MD  [facility]    Subjective   Annalisa is a 23 year old, presenting for the following health issues:  No chief complaint on file.  No flowsheet data found.  History of Present Illness       Reason for visit:  Followups for meds and retreavel of doctors note saying i can work after my upset stomache, yeah im mad too    Annalisa Argueta eats 0-1 servings of fruits and vegetables daily.Annalisa Argueta consumes 4 sweetened beverage(s) daily.Annalisa Argueta exercises with enough effort to increase Annalisa Argueta's heart rate 10 to 19 minutes per day.  Annalisa Argueta exercises with enough effort to increase Annalisa Argueta's heart rate 4 days per week. Annalisa Argueta is missing 2 dose(s) of medications per week.  Annalisa Argueta is not taking prescribed medications regularly due to remembering to take and other.    Today's PHQ-9         PHQ-9 Total  Score: 3    PHQ-9 Q9 Thoughts of better off dead/self-harm past 2 weeks :   Not at all    How difficult have these problems made it for you to do your work, take care of things at home, or get along with other people: Somewhat difficult       Asthma Follow-Up    Was ACT completed today?  No      Do you have a cough?  YES    Are you experiencing any wheezing in your chest?  YES     Do you have any shortness of breath?  No     How often are you using a short-acting (rescue) inhaler or nebulizer, such as Albuterol?  2-3 times per day    How many days per week do you miss taking your asthma controller medication?  2    Please describe any recent triggers for your asthma: weather changing    Have you had any Emergency Room Visits, Urgent Care Visits, or Hospital Admissions since your last office visit?  No     Able to go on walks, noticed some problems with the changes in seasons and recent in depth cleaning of her house.  Does not need to change her medication doses at all right now.      Medication Followup of  methylphenidate (DAYTRANA) 10 MG/9HR patch    Taking Medication as prescribed: yes    Side Effects:  Site where the patch was applied is red, rash goes away after 10-12 hours     Medication Helping Symptoms:  yes    Still feels that the patch is working well for her at the current dose.  Tries not to use them on days she is at home, skips weekends.    Work is needing a doctors note to come back to work- she has been having stomach issues: she had some sushi and got nauseated, missed work on 3-29-23.  Now she is feeling fine, watching what she eating. No vomiting, or diarrhea.  No fevers.      Review of Systems   Constitutional, HEENT, cardiovascular, pulmonary, gi and gu systems are negative, except as otherwise noted.      Objective           Vitals:  No vitals were obtained today due to virtual visit.    Physical Exam   GENERAL: Healthy, alert and no distress  EYES: Eyes grossly normal to inspection.  No  discharge or erythema, or obvious scleral/conjunctival abnormalities.  RESP: No audible wheeze, cough, or visible cyanosis.  No visible retractions or increased work of breathing.    SKIN: Visible skin clear. No significant rash, abnormal pigmentation or lesions.  NEURO: Cranial nerves grossly intact.  Mentation and speech appropriate for age.  PSYCH: Mentation appears normal, affect normal/bright, judgement and insight intact, normal speech and appearance well-groomed.            Video-Visit Details    Type of service:  Video Visit   Video Start Time: 10:39 am  Video End Time:10:48 am    Originating Location (pt. Location): Home  Distant Location (provider location):  Off-site  Platform used for Video Visit: FanLib

## 2023-03-31 NOTE — LETTER
March 31, 2023      Kailey Argueta  [address] [address]        To Whom It May Concern:    Annalisa Argueta was seen in our clinic.  Please excuse her from work on Wednesday, March 29, 2023 due to illness. She may return to work without restrictions.      Sincerely,        Zohra Garcia MD  (signed electronically)           Parent

## 2023-04-22 ENCOUNTER — HEALTH MAINTENANCE LETTER (OUTPATIENT)
Age: 23
End: 2023-04-22

## 2023-05-02 ENCOUNTER — OFFICE VISIT (OUTPATIENT)
Dept: FAMILY MEDICINE | Facility: CLINIC | Age: 23
End: 2023-05-02
Payer: COMMERCIAL

## 2023-05-02 VITALS
OXYGEN SATURATION: 98 % | DIASTOLIC BLOOD PRESSURE: 81 MMHG | TEMPERATURE: 98.1 F | BODY MASS INDEX: 24.58 KG/M2 | HEIGHT: 67 IN | WEIGHT: 156.6 LBS | SYSTOLIC BLOOD PRESSURE: 125 MMHG | HEART RATE: 96 BPM | RESPIRATION RATE: 18 BRPM

## 2023-05-02 DIAGNOSIS — Z23 NEED FOR COVID-19 VACCINE: ICD-10-CM

## 2023-05-02 DIAGNOSIS — F39 MOOD DISORDER (H): ICD-10-CM

## 2023-05-02 DIAGNOSIS — Z00.00 ROUTINE GENERAL MEDICAL EXAMINATION AT A HEALTH CARE FACILITY: Primary | ICD-10-CM

## 2023-05-02 LAB
ANION GAP SERPL CALCULATED.3IONS-SCNC: 9 MMOL/L (ref 7–15)
BUN SERPL-MCNC: 10.6 MG/DL (ref 6–20)
CALCIUM SERPL-MCNC: 9.1 MG/DL (ref 8.6–10)
CHLORIDE SERPL-SCNC: 106 MMOL/L (ref 98–107)
CREAT SERPL-MCNC: 0.7 MG/DL (ref 0.51–1.17)
DEPRECATED HCO3 PLAS-SCNC: 25 MMOL/L (ref 22–29)
ERYTHROCYTE [DISTWIDTH] IN BLOOD BY AUTOMATED COUNT: 12.5 % (ref 10–15)
GFR SERPL CREATININE-BSD FRML MDRD: >90 ML/MIN/1.73M2
GLUCOSE SERPL-MCNC: 86 MG/DL (ref 70–99)
HBA1C MFR BLD: 5.2 % (ref 0–5.6)
HCT VFR BLD AUTO: 42.6 % (ref 35–53)
HGB BLD-MCNC: 14.5 G/DL (ref 11.7–17.7)
MCH RBC QN AUTO: 31 PG (ref 26.5–33)
MCHC RBC AUTO-ENTMCNC: 34 G/DL (ref 31.5–36.5)
MCV RBC AUTO: 91 FL (ref 78–100)
PLATELET # BLD AUTO: 233 10E3/UL (ref 150–450)
POTASSIUM SERPL-SCNC: 3.9 MMOL/L (ref 3.4–5.3)
RBC # BLD AUTO: 4.68 10E6/UL (ref 3.8–5.9)
SODIUM SERPL-SCNC: 140 MMOL/L (ref 136–145)
WBC # BLD AUTO: 6.5 10E3/UL (ref 4–11)

## 2023-05-02 PROCEDURE — 80048 BASIC METABOLIC PNL TOTAL CA: CPT | Performed by: FAMILY MEDICINE

## 2023-05-02 PROCEDURE — 85027 COMPLETE CBC AUTOMATED: CPT | Performed by: FAMILY MEDICINE

## 2023-05-02 PROCEDURE — 83036 HEMOGLOBIN GLYCOSYLATED A1C: CPT | Performed by: FAMILY MEDICINE

## 2023-05-02 PROCEDURE — 0134A COVID-19 BIVALENT 18+ (MODERNA): CPT | Performed by: FAMILY MEDICINE

## 2023-05-02 PROCEDURE — 91313 COVID-19 BIVALENT 18+ (MODERNA): CPT | Performed by: FAMILY MEDICINE

## 2023-05-02 PROCEDURE — 36415 COLL VENOUS BLD VENIPUNCTURE: CPT | Performed by: FAMILY MEDICINE

## 2023-05-02 PROCEDURE — 99395 PREV VISIT EST AGE 18-39: CPT | Mod: 25 | Performed by: FAMILY MEDICINE

## 2023-05-02 RX ORDER — QUETIAPINE FUMARATE 50 MG/1
50 TABLET, FILM COATED ORAL AT BEDTIME
Qty: 90 TABLET | Refills: 3 | Status: SHIPPED | OUTPATIENT
Start: 2023-05-02 | End: 2024-04-19

## 2023-05-02 SDOH — ECONOMIC STABILITY: FOOD INSECURITY: WITHIN THE PAST 12 MONTHS, THE FOOD YOU BOUGHT JUST DIDN'T LAST AND YOU DIDN'T HAVE MONEY TO GET MORE.: OFTEN TRUE

## 2023-05-02 SDOH — ECONOMIC STABILITY: TRANSPORTATION INSECURITY
IN THE PAST 12 MONTHS, HAS LACK OF TRANSPORTATION KEPT YOU FROM MEETINGS, WORK, OR FROM GETTING THINGS NEEDED FOR DAILY LIVING?: NO

## 2023-05-02 SDOH — ECONOMIC STABILITY: FOOD INSECURITY: WITHIN THE PAST 12 MONTHS, YOU WORRIED THAT YOUR FOOD WOULD RUN OUT BEFORE YOU GOT MONEY TO BUY MORE.: OFTEN TRUE

## 2023-05-02 SDOH — HEALTH STABILITY: PHYSICAL HEALTH: ON AVERAGE, HOW MANY MINUTES DO YOU ENGAGE IN EXERCISE AT THIS LEVEL?: 10 MIN

## 2023-05-02 SDOH — ECONOMIC STABILITY: INCOME INSECURITY: HOW HARD IS IT FOR YOU TO PAY FOR THE VERY BASICS LIKE FOOD, HOUSING, MEDICAL CARE, AND HEATING?: VERY HARD

## 2023-05-02 SDOH — ECONOMIC STABILITY: TRANSPORTATION INSECURITY
IN THE PAST 12 MONTHS, HAS THE LACK OF TRANSPORTATION KEPT YOU FROM MEDICAL APPOINTMENTS OR FROM GETTING MEDICATIONS?: NO

## 2023-05-02 SDOH — ECONOMIC STABILITY: INCOME INSECURITY: IN THE LAST 12 MONTHS, WAS THERE A TIME WHEN YOU WERE NOT ABLE TO PAY THE MORTGAGE OR RENT ON TIME?: YES

## 2023-05-02 SDOH — HEALTH STABILITY: PHYSICAL HEALTH: ON AVERAGE, HOW MANY DAYS PER WEEK DO YOU ENGAGE IN MODERATE TO STRENUOUS EXERCISE (LIKE A BRISK WALK)?: 1 DAY

## 2023-05-02 ASSESSMENT — SOCIAL DETERMINANTS OF HEALTH (SDOH)
ARE YOU MARRIED, WIDOWED, DIVORCED, SEPARATED, NEVER MARRIED, OR LIVING WITH A PARTNER?: NEVER MARRIED
DO YOU BELONG TO ANY CLUBS OR ORGANIZATIONS SUCH AS CHURCH GROUPS UNIONS, FRATERNAL OR ATHLETIC GROUPS, OR SCHOOL GROUPS?: NO
HOW OFTEN DO YOU GET TOGETHER WITH FRIENDS OR RELATIVES?: ONCE A WEEK
IN A TYPICAL WEEK, HOW MANY TIMES DO YOU TALK ON THE PHONE WITH FAMILY, FRIENDS, OR NEIGHBORS?: MORE THAN THREE TIMES A WEEK
HOW OFTEN DO YOU ATTEND CHURCH OR RELIGIOUS SERVICES?: NEVER

## 2023-05-02 ASSESSMENT — ENCOUNTER SYMPTOMS
HEMATURIA: 0
ARTHRALGIAS: 1
PARESTHESIAS: 1
DYSURIA: 0
FREQUENCY: 1
BREAST MASS: 0
HEADACHES: 0
COUGH: 1
SORE THROAT: 0
JOINT SWELLING: 0
SHORTNESS OF BREATH: 0
CONSTIPATION: 1
CHILLS: 0
MYALGIAS: 1
WEAKNESS: 1
DIZZINESS: 0
DIARRHEA: 0
NAUSEA: 1
PALPITATIONS: 0
HEARTBURN: 1
ABDOMINAL PAIN: 1
FEVER: 0
HEMATOCHEZIA: 0
EYE PAIN: 0
NERVOUS/ANXIOUS: 0

## 2023-05-02 ASSESSMENT — LIFESTYLE VARIABLES
HOW OFTEN DO YOU HAVE SIX OR MORE DRINKS ON ONE OCCASION: LESS THAN MONTHLY
SKIP TO QUESTIONS 9-10: 0
AUDIT-C TOTAL SCORE: 3
HOW MANY STANDARD DRINKS CONTAINING ALCOHOL DO YOU HAVE ON A TYPICAL DAY: 3 OR 4
HOW OFTEN DO YOU HAVE A DRINK CONTAINING ALCOHOL: MONTHLY OR LESS

## 2023-05-02 ASSESSMENT — PATIENT HEALTH QUESTIONNAIRE - PHQ9
SUM OF ALL RESPONSES TO PHQ QUESTIONS 1-9: 8
10. IF YOU CHECKED OFF ANY PROBLEMS, HOW DIFFICULT HAVE THESE PROBLEMS MADE IT FOR YOU TO DO YOUR WORK, TAKE CARE OF THINGS AT HOME, OR GET ALONG WITH OTHER PEOPLE: SOMEWHAT DIFFICULT
SUM OF ALL RESPONSES TO PHQ QUESTIONS 1-9: 8

## 2023-05-02 ASSESSMENT — PAIN SCALES - GENERAL: PAINLEVEL: MODERATE PAIN (4)
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
## 2023-05-02 NOTE — LETTER
My Asthma Action Plan    Name: Kailey Argueta   YOB: 2000  Date: 5/2/2023   My doctor: Zohra Garcia MD   My clinic: [facility]        My Control Medicine: Fluticasone propionate + salmeterol (Advair Diskus or Wixela Inhub) -  500/50 mcg twice per day  My Rescue Medicine: Albuterol (Proair/Ventolin/Proventil HFA) 2-4 puffs EVERY 4 HOURS as needed. Use a spacer if recommended by your provider.   My Asthma Severity:   Mild Persistent  Know your asthma triggers: smoke, dust mites, and pollens  weather changing            GREEN ZONE   Good Control  I feel good  No cough or wheeze  Can work, sleep and play without asthma symptoms       Take your asthma control medicine every day.     If exercise triggers your asthma, take your rescue medication  15 minutes before exercise or sports, and  During exercise if you have asthma symptoms  Spacer to use with inhaler: If you have a spacer, make sure to use it with your inhaler             YELLOW ZONE Getting Worse  I have ANY of these:  I do not feel good  Cough or wheeze  Chest feels tight  Wake up at night   Keep taking your Green Zone medications  Start taking your rescue medicine:  every 20 minutes for up to 1 hour. Then every 4 hours for 24-48 hours.  If you stay in the Yellow Zone for more than 12-24 hours, contact your doctor.  If you do not return to the Green Zone in 12-24 hours or you get worse, start taking your oral steroid medicine if prescribed by your provider.           RED ZONE Medical Alert - Get Help  I have ANY of these:  I feel awful  Medicine is not helping  Breathing getting harder  Trouble walking or talking  Nose opens wide to breathe       Take your rescue medicine NOW  If your provider has prescribed an oral steroid medicine, start taking it NOW  Call your doctor NOW  If you are still in the Red Zone after 20 minutes and you have not reached your doctor:  Take your rescue medicine again and  Call 911 or go  to the emergency room right away    See your regular doctor within 2 weeks of an Emergency Room or Urgent Care visit for follow-up treatment.          Annual Reminders:  Meet with Asthma Educator,  Flu Shot in the Fall, consider Pneumonia Vaccination for patients with asthma (aged 19 and older).    Pharmacy: [id number] [id number]    Electronically signed by Zohra Garcia MD   Date: 05/02/23                      Asthma Triggers  How To Control Things That Make Your Asthma Worse    Triggers are things that make your asthma worse.  Look at the list below to help you find your triggers and what you can do about them.  You can help prevent asthma flare-ups by staying away from your triggers.      Trigger                                                          What you can do   Cigarette Smoke  Tobacco smoke can make asthma worse. Do not allow smoking in your home, car or around you.  Be sure no one smokes at a child s day care or school.  If you smoke, ask your health care provider for ways to help you quit.  Ask family members to quit too.  Ask your health care provider for a referral to Quit Plan to help you quit smoking, or call 9-135-167-PLAN.     Colds, Flu, Bronchitis  These are common triggers of asthma. Wash your hands often.  Don t touch your eyes, nose or mouth.  Get a flu shot every year.     Dust Mites  These are tiny bugs that live in cloth or carpet. They are too small to see. Wash sheets and blankets in hot water every week.   Encase pillows and mattress in dust mite proof covers.  Avoid having carpet if you can. If you have carpet, vacuum weekly.   Use a dust mask and HEPA vacuum.   Pollen and Outdoor Mold  Some people are allergic to trees, grass, or weed pollen, or molds. Try to keep your windows closed.  Limit time out doors when pollen count is high.   Ask you health care provider about taking medicine during allergy season.     Animal Dander  Some people  are allergic to skin flakes, urine or saliva from pets with fur or feathers. Keep pets with fur or feathers out of your home.    If you can t keep the pet outdoors, then keep the pet out of your bedroom.  Keep the bedroom door closed.  Keep pets off cloth furniture and away from stuffed toys.     Mice, Rats, and Cockroaches   Some people are allergic to the waste from these pests.   Cover food and garbage.  Clean up spills and food crumbs.  Store grease in the refrigerator.   Keep food out of the bedroom.   Indoor Mold  This can be a trigger if your home has high moisture. Fix leaking faucets, pipes, or other sources of water.   Clean moldy surfaces.  Dehumidify basement if it is damp and smelly.   Smoke, Strong Odors, and Sprays  These can reduce air quality. Stay away from strong odors and sprays, such as perfume, powder, hair spray, paints, smoke incense, paint, cleaning products, candles and new carpet.   Exercise or Sports  Some people with asthma have this trigger. Be active!  Ask your doctor about taking medicine before sports or exercise to prevent symptoms.    Warm up for 5-10 minutes before and after sports or exercise.     Other Triggers of Asthma  Cold air:  Cover your nose and mouth with a scarf.  Sometimes laughing or crying can be a trigger.  Some medicines and food can trigger asthma.

## 2023-05-02 NOTE — PROGRESS NOTES
SUBJECTIVE:   CC: Annalisa is an 23 year old who presents for preventive health visit.       5/2/2023    10:08 AM   Additional Questions   Roomed by Geovanna HARRINGTON CMA     Here for physical.    Wondering about keloid bumps on her legs from bumping into things, bump on her right knee is improved, right anterior shin improved.  Also has some new moles that are appearing.      Patient has been advised of split billing requirements and indicates understanding: Yes  Healthy Habits:     Getting at least 3 servings of Calcium per day:  NO    Bi-annual eye exam:  Yes    Dental care twice a year:  Yes    Sleep apnea or symptoms of sleep apnea:  None    Diet:  Other    Frequency of exercise:  2-3 days/week    Duration of exercise:  15-30 minutes    Taking medications regularly:  Yes    PHQ-2 Total Score: 0    Additional concerns today:  Yes        Today's PHQ-2 Score:       5/2/2023     8:31 AM   PHQ-2 ( 1999 [employer])   Q1: Little interest or pleasure in doing things 0   Q2: Feeling down, depressed or hopeless 0   PHQ-2 Score 0   Q1: Little interest or pleasure in doing things Not at all   Q2: Feeling down, depressed or hopeless Not at all   PHQ-2 Score 0     Have you ever done Advance Care Planning? (For example, a Health Directive,  POLST, or a discussion with a medical provider or your loved ones about your wishes): No, advance care planning information given to patient to review.  Patient declined advance care planning discussion at this time.    Social History     Tobacco Use     Smoking status: Every Day     Packs/day: 0.45     Years: 5.00     Pack years: 2.25     Types: Cigarettes     Start date: 9/1/2017     Smokeless tobacco: Never     Tobacco comments:     1-2  cigs per day   Vaping Use     Vaping status: Every Day     Substances: Nicotine, Flavoring     Devices: Disposable     Passive vaping exposure: Yes   Substance Use Topics     Alcohol use: Not Currently     Comment: rare             5/2/2023     8:31 AM   Alcohol Use    Prescreen: >3 drinks/day or >7 drinks/week? No     Reviewed orders with patient.  Reviewed health maintenance and updated orders accordingly - Yes  Labs reviewed in EPIC  BP Readings from Last 3 Encounters:   05/02/23 125/81   01/31/23 128/70   11/23/22 137/84    Wt Readings from Last 3 Encounters:   05/02/23 71 kg (156 lb 9.6 oz)   01/31/23 70.5 kg (155 lb 8 oz)   11/23/22 65.9 kg (145 lb 3.2 oz)                  Patient Active Problem List   Diagnosis     Panic attack     Migraine without status migrainosus, not intractable, unspecified migraine type     Mood disorder (H)     Gastroesophageal reflux disease with esophagitis     Abdominal pain, chronic, bilateral lower quadrant     Mild episode of recurrent major depressive disorder (H)     Borderline personality disorder (H)     Cannabis abuse     History of homicidal ideation     Posttraumatic stress disorder     Slow transit constipation     Suicidal thoughts     Moderate persistent asthma without complication     Dysfunctional uterine bleeding     Tobacco use disorder     Elevated blood pressure reading without diagnosis of hypertension     Cannabis dependence (H)     ADHD (attention deficit hyperactivity disorder)     Autism spectrum     Chronic bilateral low back pain with right-sided sciatica     Hypermobility syndrome     Past Surgical History:   Procedure Laterality Date     AS INDUCED ABORTN BY D&C  01/2019    16 weeks. PP     DILATION AND CURETTAGE  01/2019       Social History     Tobacco Use     Smoking status: Every Day     Packs/day: 0.45     Years: 5.00     Pack years: 2.25     Types: Cigarettes     Start date: 9/1/2017     Smokeless tobacco: Never     Tobacco comments:     1-2  cigs per day   Vaping Use     Vaping status: Every Day     Substances: Nicotine, Flavoring     Devices: Disposable     Passive vaping exposure: Yes   Substance Use Topics     Alcohol use: Not Currently     Comment: rare     Family History   Problem Relation Age of Onset      Diabetes Mother      Hypertension Mother      Skin Cancer Mother      Hyperlipidemia Mother      Other Cancer Mother         Skin cancer     Depression Mother      Mental Illness Mother      Asthma Mother      Diabetes Father      Depression Father      Mental Illness Father         Dyslexia     Endometriosis Sister      Anxiety Disorder Sister      Hypertension Maternal Grandmother      Hyperlipidemia Maternal Grandmother      Anesthesia Reaction Maternal Grandmother      Osteoporosis Maternal Grandmother      Crohn's Disease Maternal Grandfather      Hyperlipidemia Maternal Grandfather      Prostate Cancer Maternal Grandfather      Other Cancer Maternal Grandfather         Skin cancer     Anxiety Disorder Sister      Cerebrovascular Disease Other         Dads grandma         Current Outpatient Medications   Medication Sig Dispense Refill     acetaminophen (TYLENOL) 500 MG tablet Take 500-1,000 mg by mouth every 6 hours as needed for mild pain       albuterol (PROAIR HFA/PROVENTIL HFA/VENTOLIN HFA) 108 (90 Base) MCG/ACT inhaler INHALE 1-2 PUFFS BY MOUTH EVERY 4 HOURS AS NEEDED FOR SHORTNESS OF BREATH / DYSPNEA OR WHEEZING 18 g 1     diclofenac (VOLTAREN) 1 % topical gel Apply 4 g topically 4 times daily as needed for moderate pain 350 g 0     fluticasone-salmeterol (WIXELA INHUB) 500-50 MCG/ACT inhaler Inhale 1 puff into the lungs every 12 hours 60 each 5     gabapentin (NEURONTIN) 300 MG capsule Take 300 mg by mouth 2 times daily       hydrOXYzine (VISTARIL) 50 MG capsule Take 50 mg by mouth       methylphenidate (DAYTRANA) 10 MG/9HR patch Place 1 patch onto the skin daily wear patch for 9 hours only each day 30 patch 0     naproxen (NAPROSYN) 500 MG tablet TAKE 1 TABLET (500 MG) BY MOUTH 2 TIMES DAILY (WITH MEALS) AS NEEDED FOR PAIN 60 tablet 1     QUEtiapine (SEROQUEL) 50 MG tablet Take 1 tablet (50 mg) by mouth At Bedtime 90 tablet 3     VALERIAN ROOT PO        Allergies   Allergen Reactions     Kiwi Other  (See Comments)     Mild throat swelling per pt; able to have artificially sweetened kiwi flavor     Recent Labs   Lab Test 05/02/23  1100 07/31/22  1928 03/08/22  1650 12/16/20  1616 09/22/20  1306 06/22/20  0921   A1C 5.2  --   --  5.5  --  5.2   ALT  --   --   --  27 24 16   CR  --  0.67 0.78 0.66 0.68 0.73   GFRESTIMATED  --  >90 >90 >90 >90 >60   GFRESTBLACK  --   --   --  >90 >90 >60   POTASSIUM  --  3.6 3.5 3.9 3.7 3.8   TSH  --   --   --  1.96  --   --         Breast Cancer Screening:    FHS-7:        View : No data to display.                Patient under 40 years of age: Routine Mammogram Screening not recommended.   Pertinent mammograms are reviewed under the imaging tab.    History of abnormal Pap smear: NO - age 21-29 PAP every 3 years recommended     Reviewed and updated as needed this visit by clinical staff   Tobacco  Allergies  Meds   Med Hx  Surg Hx  Fam Hx  Soc Hx        Reviewed and updated as needed this visit by Provider   Tobacco   Meds   Med Hx  Surg Hx  Fam Hx  Soc Hx       Past Medical History:   Diagnosis Date     ADHD (attention deficit hyperactivity disorder)      Anxiety      Asthma     believes she has asthma     Bipolar 1 disorder (H)      Depressive disorder 2013    Recently gotten worse     Headache      IUD contraception 01/2019    post ab      Past Surgical History:   Procedure Laterality Date     AS INDUCED ABORTN BY D&C  01/2019    16 weeks. PP     DILATION AND CURETTAGE  01/2019       Review of Systems   Constitutional: Negative for chills and fever.   HENT: Positive for congestion. Negative for ear pain, hearing loss and sore throat.    Eyes: Negative for pain and visual disturbance.   Respiratory: Positive for cough. Negative for shortness of breath.    Cardiovascular: Positive for chest pain. Negative for palpitations.   Gastrointestinal: Positive for abdominal pain, constipation and nausea. Negative for diarrhea.   Genitourinary: Positive for frequency and  "urgency. Negative for dysuria, genital sores and hematuria.   Musculoskeletal: Positive for arthralgias and myalgias. Negative for joint swelling.   Skin: Negative for rash.   Neurological: Positive for weakness. Negative for dizziness and headaches.   Psychiatric/Behavioral: The patient is not nervous/anxious.           OBJECTIVE:   /81 (BP Location: Right arm, Patient Position: Sitting, Cuff Size: Adult Regular)   Pulse 96   Temp 98.1  F (36.7  C) (Temporal)   Resp 18   Ht 1.702 m (5' 7\")   Wt 71 kg (156 lb 9.6 oz)   LMP  (LMP Unknown)   SpO2 98%   BMI 24.53 kg/m    Physical Exam  GENERAL: healthy, alert and no distress  EYES: Eyes grossly normal to inspection, PERRL and conjunctivae and sclerae normal  HENT: ear canals and TM's normal, nose and mouth without ulcers or lesions  NECK: no adenopathy, no asymmetry, masses, or scars and thyroid normal to palpation  RESP: lungs clear to auscultation - no rales, rhonchi or wheezes  CV: regular rate and rhythm, normal S1 S2, no S3 or S4, no murmur, click or rub, no peripheral edema and peripheral pulses strong  ABDOMEN: soft, nontender, no hepatosplenomegaly, no masses and bowel sounds normal  MS: no gross musculoskeletal defects noted, no edema  SKIN: no suspicious lesions or rashes  NEURO: Normal strength and tone, mentation intact and speech normal  PSYCH: mentation appears normal, affect normal/bright    Diagnostic Test Results:  Labs reviewed in Epic    ASSESSMENT/PLAN:       ICD-10-CM    1. Routine general medical examination at a health care facility  Z00.00 Hemoglobin A1c     Basic metabolic panel  (Ca, Cl, CO2, Creat, Gluc, K, Na, BUN)     CBC with platelets     Hemoglobin A1c     Basic metabolic panel  (Ca, Cl, CO2, Creat, Gluc, K, Na, BUN)     CBC with platelets      2. Mood disorder (H)  F39 QUEtiapine (SEROQUEL) 50 MG tablet      3. Need for COVID-19 vaccine  Z23 COVID-19 BIVALENT 18+ (MODERNA)          Patient has been advised of split billing " requirements and indicates understanding: Yes      COUNSELING:  Reviewed preventive health counseling, as reflected in patient instructions        Annalisa Argueta reports that Annalisa Argueta has been smoking cigarettes. Annalisa Argueta started smoking about 5 years ago. Annalisa Argueta has a 2.25 pack-year smoking history. Annalisa Argueta has never used smokeless tobacco.  Nicotine/Tobacco Cessation Plan:   Self help information given to patient      Zohra Garcia MD  [facility]

## 2023-05-26 ENCOUNTER — PATIENT OUTREACH (OUTPATIENT)
Dept: FAMILY MEDICINE | Facility: CLINIC | Age: 23
End: 2023-05-26
Payer: COMMERCIAL

## 2023-05-26 NOTE — TELEPHONE ENCOUNTER
- Chart review post physical with Dr. SMITH.   - Labs stable, follow-up appt scheduled for  due items  - Nothing further needed at this time  - Will follow-up with patient in August 2023    Gilbert Bourne RN  Patient Advocate Liaison (PAL)  [facility]  [phone]    05/26/2023 at 3:16 PM

## 2023-08-02 ENCOUNTER — OFFICE VISIT (OUTPATIENT)
Dept: FAMILY MEDICINE | Facility: CLINIC | Age: 23
End: 2023-08-02
Payer: COMMERCIAL

## 2023-08-02 VITALS
TEMPERATURE: 98.5 F | SYSTOLIC BLOOD PRESSURE: 131 MMHG | OXYGEN SATURATION: 100 % | WEIGHT: 158.7 LBS | HEIGHT: 67 IN | BODY MASS INDEX: 24.91 KG/M2 | DIASTOLIC BLOOD PRESSURE: 88 MMHG | HEART RATE: 77 BPM | RESPIRATION RATE: 20 BRPM

## 2023-08-02 DIAGNOSIS — J45.40 MODERATE PERSISTENT ASTHMA WITHOUT COMPLICATION: ICD-10-CM

## 2023-08-02 DIAGNOSIS — Z23 NEED FOR VACCINATION: ICD-10-CM

## 2023-08-02 DIAGNOSIS — Z12.4 CERVICAL CANCER SCREENING: Primary | ICD-10-CM

## 2023-08-02 DIAGNOSIS — M35.7 HYPERMOBILITY SYNDROME: ICD-10-CM

## 2023-08-02 DIAGNOSIS — M25.571 BILATERAL ANKLE PAIN, UNSPECIFIED CHRONICITY: ICD-10-CM

## 2023-08-02 DIAGNOSIS — G89.29 CHRONIC PAIN OF RIGHT KNEE: ICD-10-CM

## 2023-08-02 DIAGNOSIS — F90.9 ATTENTION DEFICIT HYPERACTIVITY DISORDER (ADHD), UNSPECIFIED ADHD TYPE: ICD-10-CM

## 2023-08-02 DIAGNOSIS — R06.2 WHEEZING: ICD-10-CM

## 2023-08-02 DIAGNOSIS — M25.561 CHRONIC PAIN OF RIGHT KNEE: ICD-10-CM

## 2023-08-02 DIAGNOSIS — M25.572 BILATERAL ANKLE PAIN, UNSPECIFIED CHRONICITY: ICD-10-CM

## 2023-08-02 DIAGNOSIS — M51.26 LUMBAR DISC HERNIATION: ICD-10-CM

## 2023-08-02 PROCEDURE — 90471 IMMUNIZATION ADMIN: CPT | Performed by: FAMILY MEDICINE

## 2023-08-02 PROCEDURE — 90746 HEPB VACCINE 3 DOSE ADULT IM: CPT | Performed by: FAMILY MEDICINE

## 2023-08-02 PROCEDURE — 90472 IMMUNIZATION ADMIN EACH ADD: CPT | Performed by: FAMILY MEDICINE

## 2023-08-02 PROCEDURE — 90632 HEPA VACCINE ADULT IM: CPT | Performed by: FAMILY MEDICINE

## 2023-08-02 PROCEDURE — G0145 SCR C/V CYTO,THINLAYER,RESCR: HCPCS | Performed by: FAMILY MEDICINE

## 2023-08-02 PROCEDURE — 99214 OFFICE O/P EST MOD 30 MIN: CPT | Mod: 25 | Performed by: FAMILY MEDICINE

## 2023-08-02 RX ORDER — ALBUTEROL SULFATE 90 UG/1
AEROSOL, METERED RESPIRATORY (INHALATION)
Qty: 18 G | Refills: 3 | Status: SHIPPED | OUTPATIENT
Start: 2023-08-02 | End: 2024-07-22

## 2023-08-02 RX ORDER — FLUTICASONE PROPIONATE AND SALMETEROL 500; 50 UG/1; UG/1
1 POWDER RESPIRATORY (INHALATION) EVERY 12 HOURS
Qty: 60 EACH | Refills: 5 | Status: SHIPPED | OUTPATIENT
Start: 2023-08-02 | End: 2024-04-19

## 2023-08-02 RX ORDER — METHYLPHENIDATE 1.1 MG/H
1 PATCH TRANSDERMAL DAILY
Qty: 30 PATCH | Refills: 0 | Status: SHIPPED | OUTPATIENT
Start: 2023-08-02 | End: 2024-04-19

## 2023-08-02 ASSESSMENT — PAIN SCALES - GENERAL: PAINLEVEL: WORST PAIN (10)

## 2023-08-02 ASSESSMENT — PATIENT HEALTH QUESTIONNAIRE - PHQ9: SUM OF ALL RESPONSES TO PHQ QUESTIONS 1-9: 6

## 2023-08-02 ASSESSMENT — ASTHMA QUESTIONNAIRES: ACT_TOTALSCORE: 15

## 2023-08-02 NOTE — PROGRESS NOTES
"  Assessment & Plan     Cervical cancer screening  - Pap Screen reflex to HPV if ASCUS - recommend age 25 - 29  - HPV Hold (Lab Only)    Hypermobility syndrome  - Physical Therapy Referral; Future    Bilateral ankle pain, unspecified chronicity  - Physical Therapy Referral; Future    Chronic pain of right knee  - Physical Therapy Referral; Future    Attention deficit hyperactivity disorder (ADHD), unspecified ADHD type  Stable, continue current dose.  - methylphenidate (DAYTRANA) 10 MG/9HR patch; Place 1 patch onto the skin daily wear patch for 9 hours only each day    Moderate persistent asthma without complication  Recommend she continue her current medications.  Follow up in 6 months or sooner as needed.  - fluticasone-salmeterol (WIXELA INHUB) 500-50 MCG/ACT inhaler; Inhale 1 puff into the lungs every 12 hours    Wheezing  - albuterol (PROAIR HFA/PROVENTIL HFA/VENTOLIN HFA) 108 (90 Base) MCG/ACT inhaler; INHALE 1-2 PUFFS BY MOUTH EVERY 4 HOURS AS NEEDED FOR SHORTNESS OF BREATH / DYSPNEA OR WHEEZING      25 minutes spent by me on the date of the encounter doing chart review, history and exam, documentation and further activities per the note       BMI:   Estimated body mass index is 25.23 kg/m  as calculated from the following:    Height as of this encounter: 1.689 m (5' 6.5\").    Weight as of this encounter: 72 kg (158 lb 11.2 oz).       See Patient Instructions    Zohra Garcia MD  [facility] CHIP Fang is a 23 year old, presenting for the following health issues:  Gyn Exam        8/2/2023     1:00 PM   Additional Questions   Roomed by Mary Reyes       History of Present Illness     Asthma:  Annalisa Argueta presents for follow up of asthma.  Annalisa Argueta has some cough, some wheezing, and no shortness of breath.  Annalisa Argueta is using a relief medication daily. Annalisa Argueta typically misses taking Annalisa Argueta's controller medication 2 time(s) per week. Patient is " aware of the following triggers: dust mites, exercise or sports and occupational exposure. The patient has not had a visit to the Emergency Room, Urgent Care or Hospital due to asthma since the last clinic visit.     Back Pain:  Annalisa Argueta presents for follow up of back pain. Patient's back pain is a chronic problem.  Location of back pain:  Right lower back, left lower back, right upper back, right shoulder, right hip and right side of waist  Description of back pain: cramping and dull ache  Back pain spreads: right buttocks, right thigh, right knee, right foot, right shoulder and right side of neck    Since patient first noticed back pain, pain is: gradually worsening  Does back pain interfere with Annalisa Argueta's job:  Yes       Reason for visit:  Maybe lower asthma medication and refil for ADHD Patches    Annalisa Argueta eats 2-3 servings of fruits and vegetables daily.Annalisa Argueta consumes 3 sweetened beverage(s) daily.Annalisa Argueta exercises with enough effort to increase Annalisa Argueta's heart rate 10 to 19 minutes per day.  Annalisa Argueta exercises with enough effort to increase Annalisa Wrights heart rate 3 or less days per week.   Annalisa Argueta is not taking prescribed medications regularly due to cost of medication and remembering to take.       Knees hurt, ankles starting to be painful again also, weak.  Using  Naproxen and Acetaminophen as needed.  Did PT in the past for her Hip and lower back, would be interested in trying again.    Needs refills on medications.    Does feel SOB with activity, at rest she is fine.    Needs pap today.    Patient Active Problem List   Diagnosis     Panic attack     Migraine without status migrainosus, not intractable, unspecified migraine type     Mood disorder (H)     Gastroesophageal reflux disease with esophagitis     Abdominal pain, chronic, bilateral lower quadrant     Mild episode of recurrent major depressive disorder (H)     Borderline personality disorder (H)     Cannabis  "abuse     History of homicidal ideation     Posttraumatic stress disorder     Slow transit constipation     Suicidal thoughts     Moderate persistent asthma without complication     Dysfunctional uterine bleeding     Tobacco use disorder     Elevated blood pressure reading without diagnosis of hypertension     Cannabis dependence (H)     ADHD (attention deficit hyperactivity disorder)     Autism spectrum     Chronic bilateral low back pain with right-sided sciatica     Hypermobility syndrome     Atypical squamous cells of undetermined significance (ASCUS) on Papanicolaou smear of cervix     Current Outpatient Medications   Medication Sig Dispense Refill     acetaminophen (TYLENOL) 500 MG tablet Take 500-1,000 mg by mouth every 6 hours as needed for mild pain       albuterol (PROAIR HFA/PROVENTIL HFA/VENTOLIN HFA) 108 (90 Base) MCG/ACT inhaler INHALE 1-2 PUFFS BY MOUTH EVERY 4 HOURS AS NEEDED FOR SHORTNESS OF BREATH / DYSPNEA OR WHEEZING 18 g 3     diclofenac (VOLTAREN) 1 % topical gel Apply 4 g topically 4 times daily as needed for moderate pain 350 g 0     fluticasone-salmeterol (WIXELA INHUB) 500-50 MCG/ACT inhaler Inhale 1 puff into the lungs every 12 hours 60 each 5     hydrOXYzine (VISTARIL) 50 MG capsule Take 50 mg by mouth       methylphenidate (DAYTRANA) 10 MG/9HR patch Place 1 patch onto the skin daily wear patch for 9 hours only each day 30 patch 0     naproxen (NAPROSYN) 500 MG tablet TAKE 1 TABLET (500 MG) BY MOUTH 2 TIMES DAILY (WITH MEALS) AS NEEDED FOR PAIN 60 tablet 1     QUEtiapine (SEROQUEL) 50 MG tablet Take 1 tablet (50 mg) by mouth At Bedtime 90 tablet 3     VALERIAN ROOT PO            Review of Systems   Constitutional, HEENT, cardiovascular, pulmonary, gi and gu systems are negative, except as otherwise noted.      Objective    /88 (BP Location: Right arm, Patient Position: Sitting, Cuff Size: Adult Regular)   Pulse 77   Temp 98.5  F (36.9  C) (Oral)   Resp 20   Ht 1.689 m (5' 6.5\")  "  Wt 72 kg (158 lb 11.2 oz)   LMP 07/01/2023 (Approximate)   SpO2 100%   BMI 25.23 kg/m    Body mass index is 25.23 kg/m .  Physical Exam   GENERAL: healthy, alert and no distress  RESP: lungs clear to auscultation - no rales, rhonchi or wheezes  CV: regular rates and rhythm   (female): normal female external genitalia, normal urethral meatus, vaginal mucosa, normal cervix/adnexa/uterus without masses or discharge  PSYCH: mentation appears normal, affect normal/bright

## 2023-08-02 NOTE — PROGRESS NOTES
Prior to immunization administration, verified patients identity using patient s name and date of birth. Please see Immunization Activity for additional information.     Screening Questionnaire for Adult Immunization    Are you sick today?   No   Do you have allergies to medications, food, a vaccine component or latex?   No   Have you ever had a serious reaction after receiving a vaccination?   No   Do you have a long-term health problem with heart, lung, kidney, or metabolic disease (e.g., diabetes), asthma, a blood disorder, no spleen, complement component deficiency, a cochlear implant, or a spinal fluid leak?  Are you on long-term aspirin therapy?   No   Do you have cancer, leukemia, HIV/AIDS, or any other immune system problem?   No   Do you have a parent, brother, or sister with an immune system problem?   No   In the past 3 months, have you taken medications that affect  your immune system, such as prednisone, other steroids, or anticancer drugs; drugs for the treatment of rheumatoid arthritis, Crohn s disease, or psoriasis; or have you had radiation treatments?   No   Have you had a seizure, or a brain or other nervous system problem?   No   During the past year, have you received a transfusion of blood or blood    products, or been given immune (gamma) globulin or antiviral drug?   No   For women: Are you pregnant or is there a chance you could become       pregnant during the next month?   No   Have you received any vaccinations in the past 4 weeks?   No     Immunization questionnaire answers were all negative.      Patient instructed to remain in clinic for 15 minutes afterwards, and to report any adverse reactions.     Screening performed by Mary Crowell CMA on 8/2/2023 at 1:53 PM.

## 2023-08-07 LAB
BKR LAB AP GYN ADEQUACY: NORMAL
BKR LAB AP GYN INTERPRETATION: NORMAL
BKR LAB AP HPV REFLEX: NORMAL
BKR LAB AP PREVIOUS ABNORMAL: NORMAL
PATH REPORT.COMMENTS IMP SPEC: NORMAL
PATH REPORT.COMMENTS IMP SPEC: NORMAL
PATH REPORT.RELEVANT HX SPEC: NORMAL

## 2023-08-08 ENCOUNTER — PATIENT OUTREACH (OUTPATIENT)
Dept: FAMILY MEDICINE | Facility: CLINIC | Age: 23
End: 2023-08-08
Payer: COMMERCIAL

## 2023-08-16 ENCOUNTER — THERAPY VISIT (OUTPATIENT)
Dept: PHYSICAL THERAPY | Facility: CLINIC | Age: 23
End: 2023-08-16
Attending: FAMILY MEDICINE
Payer: COMMERCIAL

## 2023-08-16 DIAGNOSIS — M25.572 BILATERAL ANKLE PAIN, UNSPECIFIED CHRONICITY: ICD-10-CM

## 2023-08-16 DIAGNOSIS — G89.29 CHRONIC PAIN OF RIGHT KNEE: ICD-10-CM

## 2023-08-16 DIAGNOSIS — M25.571 BILATERAL ANKLE PAIN, UNSPECIFIED CHRONICITY: ICD-10-CM

## 2023-08-16 DIAGNOSIS — M25.561 CHRONIC PAIN OF RIGHT KNEE: ICD-10-CM

## 2023-08-16 DIAGNOSIS — M35.7 HYPERMOBILITY SYNDROME: ICD-10-CM

## 2023-08-16 PROCEDURE — 97530 THERAPEUTIC ACTIVITIES: CPT | Mod: GP | Performed by: PHYSICAL THERAPIST

## 2023-08-16 PROCEDURE — 97110 THERAPEUTIC EXERCISES: CPT | Mod: GP | Performed by: PHYSICAL THERAPIST

## 2023-08-16 PROCEDURE — 97161 PT EVAL LOW COMPLEX 20 MIN: CPT | Mod: GP | Performed by: PHYSICAL THERAPIST

## 2023-08-16 ASSESSMENT — ACTIVITIES OF DAILY LIVING (ADL)
STAND: ACTIVITY IS SOMEWHAT DIFFICULT
KNEE_ACTIVITY_OF_DAILY_LIVING_SCORE: 67.14
WALK: ACTIVITY IS SOMEWHAT DIFFICULT
SIT WITH YOUR KNEE BENT: ACTIVITY IS SOMEWHAT DIFFICULT
GIVING WAY, BUCKLING OR SHIFTING OF KNEE: THE SYMPTOM AFFECTS MY ACTIVITY SLIGHTLY
HOW_WOULD_YOU_RATE_THE_CURRENT_FUNCTION_OF_YOUR_KNEE_DURING_YOUR_USUAL_DAILY_ACTIVITIES_ON_A_SCALE_FROM_0_TO_100_WITH_100_BEING_YOUR_LEVEL_OF_KNEE_FUNCTION_PRIOR_TO_YOUR_INJURY_AND_0_BEING_THE_INABILITY_TO_PERFORM_ANY_OF_YOUR_USUAL_DAILY_ACTIVITIES?: 70
PAIN: THE SYMPTOM AFFECTS MY ACTIVITY SLIGHTLY
HOW_WOULD_YOU_RATE_THE_OVERALL_FUNCTION_OF_YOUR_KNEE_DURING_YOUR_USUAL_DAILY_ACTIVITIES?: NEARLY NORMAL
AS_A_RESULT_OF_YOUR_KNEE_INJURY,_HOW_WOULD_YOU_RATE_YOUR_CURRENT_LEVEL_OF_DAILY_ACTIVITY?: ABNORMAL
GO UP STAIRS: ACTIVITY IS SOMEWHAT DIFFICULT
RISE FROM A CHAIR: ACTIVITY IS MINIMALLY DIFFICULT
RAW_SCORE: 47
WEAKNESS: THE SYMPTOM AFFECTS MY ACTIVITY SLIGHTLY
KNEEL ON THE FRONT OF YOUR KNEE: ACTIVITY IS MINIMALLY DIFFICULT
SWELLING: I DO NOT HAVE THE SYMPTOM
LIMPING: THE SYMPTOM AFFECTS MY ACTIVITY MODERATELY
KNEE_ACTIVITY_OF_DAILY_LIVING_SUM: 47
GO DOWN STAIRS: ACTIVITY IS NOT DIFFICULT
STIFFNESS: THE SYMPTOM AFFECTS MY ACTIVITY MODERATELY
SQUAT: ACTIVITY IS MINIMALLY DIFFICULT

## 2023-08-16 NOTE — PROGRESS NOTES
PHYSICAL THERAPY EVALUATION  Type of Visit: Evaluation    See electronic medical record for Abuse and Falls Screening details.    Subjective     Pt c/o B (R>L)knee and ankle (R>L) pain worsening over past year.   Denies initial injury but did have a fall onto R knee at age 4. MD order date 8/2/2023.  Pt also PMH LBP with occ R radicular symptoms.        Presenting condition or subjective complaint:    Date of onset: 08/02/23 (MD order date)    Relevant medical history:     Dates & types of surgery:      Prior diagnostic imaging/testing results:       Prior therapy history for the same diagnosis, illness or injury:        Prior Level of Function  Transfers: Independent  Ambulation: Independent  ADL: Independent  IADL:     Living Environment  Social support:     Type of home:     Stairs to enter the home:         Ramp:     Stairs inside the home:         Help at home:    Equipment owned:       Employment:      Hobbies/Interests:      Patient goals for therapy:      Pain assessment: Pain present     Objective   FOOT/ANKLE EVALUATION  PAIN: Pain Level at Rest: 1/10  Pain Level with Use: 5/10  Pain Location: ankle and R>L lateral ankle/foot  Pain Quality: Aching, Sharp, and Shooting  Pain Frequency: intermittent  Pain is Worst: daytime  Pain is Exacerbated By: standing, walking  Pain is Relieved By: rest  Pain Progression: Unchanged  INTEGUMENTARY (edema, incisions): WNL  POSTURE:   GAIT:   Weightbearing Status: WBAT  Assistive Device(s): None  Gait Deviations: WFL  BALANCE/PROPRIOCEPTION: Single Leg Stance Eyes Open (seconds): L 10-15 sec, R 8-10 sec  WEIGHT BEARING ALIGNMENT: Foot/Ankle WB Alignment:Pes planus L, Pes planus R  NON-WEIGHTBEARING ALIGNMENT:    ROM: AROM WNL  PROM WNL    STRENGTH:  B ankle DF/PF 5/5, EV 4/5, IN 4+/5 all pain free  FLEXIBILITY: WNL  SPECIAL TESTS:   FUNCTIONAL TESTS:   PALPATION: WNL  JOINT MOBILITY: WNL  KNEE EVALUATION  PAIN: Pain Level at Rest: 1/10  Pain Level with Use: 6/10  Pain  Location: knee and R>L ant knee pain  Pain Quality: Aching, Sharp, and Shooting  Pain Frequency: constant  Pain is Worst: daytime  Pain is Exacerbated By: walking, sit to standing, bending/squatting and stairs  Pain is Relieved By: NSAIDs, otc medications, and rest  Pain Progression: Unchanged  INTEGUMENTARY (edema, incisions): WNL  POSTURE:   GAIT:  Weightbearing Status:   Assistive Device(s):   Gait Deviations:   BALANCE/PROPRIOCEPTION:   WEIGHTBEARING ALIGNMENT:   NON-WEIGHTBEARING ALIGNMENT:   ROM:   (Degrees) Left AROM Left PROM  Right AROM Right PROM   Knee Flexion       Knee Extension       Pain:   End feel:     STRENGTH:  L knee flex 5/5, ext 5-/5 (-), Good/fair QS, R knee flex 5/5, ext 4+/5 +/-, fair/delayed QS.  Fair core stab recruitment-fair control with dbl legged bridge  FLEXIBILITY: WFL  SPECIAL TESTS:   FUNCTIONAL TESTS: Double Leg Squat: Anterior knee translation, Knee valgus, Hip internal rotation, and Improper use of glutes/hips  PALPATION: WNL  JOINT MOBILITY: WNL    Assessment & Plan   CLINICAL IMPRESSIONS  Medical Diagnosis: R>L knee and ankle pain    Treatment Diagnosis: R>L knee and ankle pain   Impression/Assessment: Patient is a 23 year old adult with R>L knee and ankle pain complaints.  The following significant findings have been identified: Pain, Decreased strength, Decreased proprioception, Impaired gait, Impaired muscle performance, and Decreased activity tolerance. These impairments interfere with their ability to perform self care tasks, work tasks, recreational activities, household chores, driving , household mobility, and community mobility as compared to previous level of function.     Clinical Decision Making (Complexity):  Clinical Presentation: Stable/Uncomplicated  Clinical Presentation Rationale: based on medical and personal factors listed in PT evaluation  Clinical Decision Making (Complexity): Low complexity    PLAN OF CARE  Treatment Interventions:  Interventions:  Neuromuscular Re-education, Therapeutic Activity, Therapeutic Exercise    Long Term Goals            Frequency of Treatment: 1x/week decreasing to 2x/month  Duration of Treatment: 3 months    Recommended Referrals to Other Professionals:   Education Assessment:   Learner/Method: Patient;Demonstration;Pictures/Video  Education Comments: online    Risks and benefits of evaluation/treatment have been explained.   Patient/Family/caregiver agrees with Plan of Care.     Evaluation Time:     PT Eval, Low Complexity Minutes (68684): 15       Signing Clinician: GISELLE Washington [facility]                                                                                   OUTPATIENT PHYSICAL THERAPY      PLAN OF TREATMENT FOR OUTPATIENT REHABILITATION   Patient's Last Name, First Name, Kailey Coburn YOB: 2000   Provider's Name   [facility]   Medical Record No.  [id number]     Onset Date: 08/02/23 (MD order date)  Start of Care Date: 08/16/23     Medical Diagnosis:  R>L knee and ankle pain      PT Treatment Diagnosis:  R>L knee and ankle pain Plan of Treatment  Frequency/Duration: 1x/week decreasing to 2x/month/ 3 months    Certification date from 08/16/23 to 11/16/23         See note for plan of treatment details and functional goals     Ricky Rahman, PT                         I CERTIFY THE NEED FOR THESE SERVICES FURNISHED UNDER        THIS PLAN OF TREATMENT AND WHILE UNDER MY CARE     (Physician attestation of this document indicates review and certification of the therapy plan).                Referring Provider:  Zohra Garcia      Initial Assessment  See Epic Evaluation- Start of Care Date: 08/16/23

## 2023-08-23 ENCOUNTER — THERAPY VISIT (OUTPATIENT)
Dept: PHYSICAL THERAPY | Facility: CLINIC | Age: 23
End: 2023-08-23
Attending: FAMILY MEDICINE
Payer: COMMERCIAL

## 2023-08-23 DIAGNOSIS — M25.561 CHRONIC PAIN OF RIGHT KNEE: ICD-10-CM

## 2023-08-23 DIAGNOSIS — M25.572 BILATERAL ANKLE PAIN, UNSPECIFIED CHRONICITY: ICD-10-CM

## 2023-08-23 DIAGNOSIS — M25.571 BILATERAL ANKLE PAIN, UNSPECIFIED CHRONICITY: ICD-10-CM

## 2023-08-23 DIAGNOSIS — M35.7 HYPERMOBILITY SYNDROME: Primary | ICD-10-CM

## 2023-08-23 DIAGNOSIS — G89.29 CHRONIC PAIN OF RIGHT KNEE: ICD-10-CM

## 2023-08-23 PROCEDURE — 97112 NEUROMUSCULAR REEDUCATION: CPT | Mod: GP | Performed by: PHYSICAL THERAPIST

## 2023-08-23 PROCEDURE — 97110 THERAPEUTIC EXERCISES: CPT | Mod: GP | Performed by: PHYSICAL THERAPIST

## 2023-08-23 PROCEDURE — 97530 THERAPEUTIC ACTIVITIES: CPT | Mod: GP | Performed by: PHYSICAL THERAPIST

## 2023-08-30 ENCOUNTER — THERAPY VISIT (OUTPATIENT)
Dept: PHYSICAL THERAPY | Facility: CLINIC | Age: 23
End: 2023-08-30
Attending: FAMILY MEDICINE
Payer: COMMERCIAL

## 2023-08-30 DIAGNOSIS — M25.561 CHRONIC PAIN OF RIGHT KNEE: ICD-10-CM

## 2023-08-30 DIAGNOSIS — M25.572 BILATERAL ANKLE PAIN, UNSPECIFIED CHRONICITY: ICD-10-CM

## 2023-08-30 DIAGNOSIS — M25.571 BILATERAL ANKLE PAIN, UNSPECIFIED CHRONICITY: ICD-10-CM

## 2023-08-30 DIAGNOSIS — G89.29 CHRONIC PAIN OF RIGHT KNEE: ICD-10-CM

## 2023-08-30 DIAGNOSIS — M35.7 HYPERMOBILITY SYNDROME: Primary | ICD-10-CM

## 2023-08-30 PROCEDURE — 97530 THERAPEUTIC ACTIVITIES: CPT | Mod: GP | Performed by: PHYSICAL THERAPIST

## 2023-08-30 PROCEDURE — 97110 THERAPEUTIC EXERCISES: CPT | Mod: GP | Performed by: PHYSICAL THERAPIST

## 2023-09-01 ENCOUNTER — TELEPHONE (OUTPATIENT)
Dept: FAMILY MEDICINE | Facility: CLINIC | Age: 23
End: 2023-09-01

## 2023-09-01 NOTE — TELEPHONE ENCOUNTER
Patient Quality Outreach    Patient is due for the following:   Asthma  -  ACT needed    Next Steps:   Patient was assigned appropriate questionnaire to complete    Type of outreach:    Sent [employer] message.    Next Steps:  Reach out within 90 days via Letter.    Max number of attempts reached: No. Will try again in 90 days if patient still on fail list.    Questions for provider review:    None           Jesus Arteaga CMA          Patient Quality Outreach    Patient is due for the following:   Asthma  -  ACT needed      Topic Date Due    Hepatitis B Vaccine (2 of 3 - 19+ 3-dose series) 08/30/2023    Flu Vaccine (1) 09/01/2023    COVID-19 Vaccine (4 - 2023-24 season) 09/01/2023       Next Steps:   Patient was assigned appropriate questionnaire to complete sent letter with ACT Questionnaire    Type of outreach:    Sent letter.      Questions for provider review:    None           Jesus Arteaga CMA

## 2023-09-01 NOTE — LETTER
October 2, 2023      Kailey Argueta  [address] [address] [facility] [address]        Dear Kailey,       You are in particular need of attention regarding:  -Asthma    We are recommending that you:  {recommendations: -Complete and return the attached ASTHMA CONTROL TEST.  If your total score is 19 or less or you have been to the ER or urgent care for your asthma, then please schedule an asthma followup appointment.    Please call us at [phone] (or use [employer]) to address the above recommendations.     Thank you for trusting us with your health care.We look forward to supporting your healthcare needs in the future.    Sincerely,      Your [facility] Care Team

## 2023-09-20 ENCOUNTER — THERAPY VISIT (OUTPATIENT)
Dept: PHYSICAL THERAPY | Facility: CLINIC | Age: 23
End: 2023-09-20
Payer: COMMERCIAL

## 2023-09-20 DIAGNOSIS — M25.561 CHRONIC PAIN OF RIGHT KNEE: ICD-10-CM

## 2023-09-20 DIAGNOSIS — G89.29 CHRONIC PAIN OF RIGHT KNEE: ICD-10-CM

## 2023-09-20 DIAGNOSIS — M25.571 BILATERAL ANKLE PAIN, UNSPECIFIED CHRONICITY: ICD-10-CM

## 2023-09-20 DIAGNOSIS — M35.7 HYPERMOBILITY SYNDROME: Primary | ICD-10-CM

## 2023-09-20 DIAGNOSIS — M25.572 BILATERAL ANKLE PAIN, UNSPECIFIED CHRONICITY: ICD-10-CM

## 2023-09-20 PROCEDURE — 97110 THERAPEUTIC EXERCISES: CPT | Mod: GP | Performed by: PHYSICAL THERAPIST

## 2023-09-20 PROCEDURE — 97530 THERAPEUTIC ACTIVITIES: CPT | Mod: GP | Performed by: PHYSICAL THERAPIST

## 2023-10-11 ENCOUNTER — THERAPY VISIT (OUTPATIENT)
Dept: PHYSICAL THERAPY | Facility: CLINIC | Age: 23
End: 2023-10-11
Payer: COMMERCIAL

## 2023-10-11 DIAGNOSIS — G89.29 CHRONIC PAIN OF RIGHT KNEE: ICD-10-CM

## 2023-10-11 DIAGNOSIS — M25.561 CHRONIC PAIN OF RIGHT KNEE: ICD-10-CM

## 2023-10-11 DIAGNOSIS — M25.572 BILATERAL ANKLE PAIN, UNSPECIFIED CHRONICITY: ICD-10-CM

## 2023-10-11 DIAGNOSIS — M35.7 HYPERMOBILITY SYNDROME: Primary | ICD-10-CM

## 2023-10-11 DIAGNOSIS — M25.571 BILATERAL ANKLE PAIN, UNSPECIFIED CHRONICITY: ICD-10-CM

## 2023-10-11 PROCEDURE — 97530 THERAPEUTIC ACTIVITIES: CPT | Mod: GP | Performed by: PHYSICAL THERAPIST

## 2023-10-11 PROCEDURE — 97110 THERAPEUTIC EXERCISES: CPT | Mod: GP | Performed by: PHYSICAL THERAPIST

## 2023-10-11 PROCEDURE — 97112 NEUROMUSCULAR REEDUCATION: CPT | Mod: GP | Performed by: PHYSICAL THERAPIST

## 2023-10-25 ENCOUNTER — TELEPHONE (OUTPATIENT)
Dept: FAMILY MEDICINE | Facility: CLINIC | Age: 23
End: 2023-10-25
Payer: COMMERCIAL

## 2023-10-25 NOTE — TELEPHONE ENCOUNTER
Patient calling and wondering how much Tylenol she can take.  Discussed 1000 mg 3 times a day is max.  She feels her skin is more yellow than it used to be.  States she takes Naproxen and then takes one Tylenol about 2 hours after Naproxen.  Advised visit for evaluation.  Nicole Vasquez RN

## 2023-11-01 ENCOUNTER — THERAPY VISIT (OUTPATIENT)
Dept: PHYSICAL THERAPY | Facility: CLINIC | Age: 23
End: 2023-11-01
Payer: COMMERCIAL

## 2023-11-01 DIAGNOSIS — M25.571 BILATERAL ANKLE PAIN, UNSPECIFIED CHRONICITY: ICD-10-CM

## 2023-11-01 DIAGNOSIS — G89.29 CHRONIC PAIN OF RIGHT KNEE: ICD-10-CM

## 2023-11-01 DIAGNOSIS — M35.7 HYPERMOBILITY SYNDROME: Primary | ICD-10-CM

## 2023-11-01 DIAGNOSIS — M25.561 CHRONIC PAIN OF RIGHT KNEE: ICD-10-CM

## 2023-11-01 DIAGNOSIS — M25.572 BILATERAL ANKLE PAIN, UNSPECIFIED CHRONICITY: ICD-10-CM

## 2023-11-01 PROCEDURE — 97112 NEUROMUSCULAR REEDUCATION: CPT | Mod: 59 | Performed by: PHYSICAL THERAPIST

## 2023-11-01 PROCEDURE — 97110 THERAPEUTIC EXERCISES: CPT | Mod: 59 | Performed by: PHYSICAL THERAPIST

## 2023-11-01 PROCEDURE — 97530 THERAPEUTIC ACTIVITIES: CPT | Mod: GP | Performed by: PHYSICAL THERAPIST

## 2023-11-15 ENCOUNTER — THERAPY VISIT (OUTPATIENT)
Dept: PHYSICAL THERAPY | Facility: CLINIC | Age: 23
End: 2023-11-15
Payer: COMMERCIAL

## 2023-11-15 DIAGNOSIS — M35.7 HYPERMOBILITY SYNDROME: Primary | ICD-10-CM

## 2023-11-15 DIAGNOSIS — M25.572 BILATERAL ANKLE PAIN, UNSPECIFIED CHRONICITY: ICD-10-CM

## 2023-11-15 DIAGNOSIS — M25.571 BILATERAL ANKLE PAIN, UNSPECIFIED CHRONICITY: ICD-10-CM

## 2023-11-15 DIAGNOSIS — G89.29 CHRONIC PAIN OF RIGHT KNEE: ICD-10-CM

## 2023-11-15 DIAGNOSIS — M25.561 CHRONIC PAIN OF RIGHT KNEE: ICD-10-CM

## 2023-11-15 PROCEDURE — 97112 NEUROMUSCULAR REEDUCATION: CPT | Mod: 59 | Performed by: PHYSICAL THERAPIST

## 2023-11-15 PROCEDURE — 97110 THERAPEUTIC EXERCISES: CPT | Mod: 59 | Performed by: PHYSICAL THERAPIST

## 2023-11-15 PROCEDURE — 97530 THERAPEUTIC ACTIVITIES: CPT | Mod: GP | Performed by: PHYSICAL THERAPIST

## 2023-11-15 ASSESSMENT — ACTIVITIES OF DAILY LIVING (ADL)
PAIN: I HAVE THE SYMPTOM BUT IT DOES NOT AFFECT MY ACTIVITY
STAND: ACTIVITY IS MINIMALLY DIFFICULT
WALK: ACTIVITY IS MINIMALLY DIFFICULT
RISE FROM A CHAIR: ACTIVITY IS MINIMALLY DIFFICULT
GO DOWN STAIRS: ACTIVITY IS NOT DIFFICULT
RAW_SCORE: 58
GO UP STAIRS: ACTIVITY IS MINIMALLY DIFFICULT
STIFFNESS: I HAVE THE SYMPTOM BUT IT DOES NOT AFFECT MY ACTIVITY
AS_A_RESULT_OF_YOUR_KNEE_INJURY,_HOW_WOULD_YOU_RATE_YOUR_CURRENT_LEVEL_OF_DAILY_ACTIVITY?: NEARLY NORMAL
SIT WITH YOUR KNEE BENT: ACTIVITY IS MINIMALLY DIFFICULT
SQUAT: ACTIVITY IS MINIMALLY DIFFICULT
SWELLING: I DO NOT HAVE THE SYMPTOM
HOW_WOULD_YOU_RATE_THE_OVERALL_FUNCTION_OF_YOUR_KNEE_DURING_YOUR_USUAL_DAILY_ACTIVITIES?: NEARLY NORMAL
WEAKNESS: I HAVE THE SYMPTOM BUT IT DOES NOT AFFECT MY ACTIVITY
KNEEL ON THE FRONT OF YOUR KNEE: ACTIVITY IS MINIMALLY DIFFICULT
LIMPING: I HAVE THE SYMPTOM BUT IT DOES NOT AFFECT MY ACTIVITY
KNEE_ACTIVITY_OF_DAILY_LIVING_SCORE: 82.86
KNEE_ACTIVITY_OF_DAILY_LIVING_SUM: 58
HOW_WOULD_YOU_RATE_THE_CURRENT_FUNCTION_OF_YOUR_KNEE_DURING_YOUR_USUAL_DAILY_ACTIVITIES_ON_A_SCALE_FROM_0_TO_100_WITH_100_BEING_YOUR_LEVEL_OF_KNEE_FUNCTION_PRIOR_TO_YOUR_INJURY_AND_0_BEING_THE_INABILITY_TO_PERFORM_ANY_OF_YOUR_USUAL_DAILY_ACTIVITIES?: 85
GIVING WAY, BUCKLING OR SHIFTING OF KNEE: I HAVE THE SYMPTOM BUT IT DOES NOT AFFECT MY ACTIVITY

## 2023-11-15 NOTE — PROGRESS NOTES
DISCHARGE  Reason for Discharge: Patient has met all goals.    Equipment Issued: none    Discharge Plan: Patient to continue home program.    Referring Provider:  April Caroljustyna Garcia       11/15/23 0500   Appointment Info   Signing clinician's name / credentials Jocelyn Rahman PT   Total/Authorized Visits 8   Visits Used 7   Medical Diagnosis R>L knee and ankle pain   PT Tx Diagnosis R>L knee and ankle pain   Quick Adds Certification   Progress Note/Certification   Start of Care Date 08/16/23   Onset of illness/injury or Date of Surgery 08/02/23  (MD order date)   Therapy Frequency 1x/week decreasing to 2x/month   Predicted Duration 3 months   Certification date from 08/16/23   Certification date to 11/16/23   Progress Note Completed Date 08/16/23   GOALS   PT Goals 2   PT Goal 1   Goal Identifier Ambulation   Goal Description The patient will be able to ambulate x30 minutes with pain <3/10.   Rationale to maximize safety and independence with performance of ADLs and functional tasks;to maximize safety and independence within the home;to maximize safety and independence within the community   Goal Progress 20-30 walking with 2-3/10 pain   Target Date 11/16/23   Date Met 11/15/23   PT Goal 2   Goal Identifier Stairs   Goal Description The patient will be able to negotiate a flight of stairs with reciprocol gait pattern and pain <3/10.   Rationale to maximize safety and independence with performance of ADLs and functional tasks;to maximize safety and independence within the home;to maximize safety and independence within the community   Goal Progress Pain free stairs majority of time   Target Date 11/16/23   Date Met 11/15/23   Subjective Report   Subjective Report Doing well.  1 episode of hip pain but otherwise short lived pain and resolved on its own   Objective Measures   Objective Measures Objective Measure 1;Objective Measure 2   Objective Measure 1   Objective Measure Pain 5/10 at IE   Details pain  "1-2/10   Objective Measure 2   Objective Measure SLS 20-25 with increased mm activity  R vs L   Details B ankle MMT 5/5 pain freee.  B knee 5/5 pain free with good QS.  Good control dbl legged squat   Treatment Interventions (PT)   Interventions Therapeutic Procedure/Exercise;Therapeutic Activity;Neuromuscular Re-education   Therapeutic Procedure/Exercise   Therapeutic Procedures: strength, endurance, ROM, flexibillity minutes (33691) 20   Therapeutic Procedures Ther Proc 2   Ther Proc 1 bike sh4   Ther Proc 1 - Details x5 min   Ther Proc 2 multi-axial 120*   Ther Proc 2 - Details 10x each V's   PTRx Ther Proc 1 Hip Flexion Straight Leg Raise   PTRx Ther Proc 1 - Details x10 substitue for iso quad set   PTRx Ther Proc 3 Bridging #1   PTRx Ther Proc 3 - Details 10x    PTRx Ther Proc 4 Clamshell Feet together   PTRx Ther Proc 4 - Details 10x to maintain position to ensure proper mm recruitment    PTRx Ther Proc 5 Theraband Diagonal Peroneals   PTRx Ther Proc 5 - Details rev   PTRx Ther Proc 6 Ankle Diagonal Posterior Tibialis   PTRx Ther Proc 6 - Details rev   PTRx Ther Proc 7 Roll Ins   PTRx Ther Proc 7 - Details 10x 5 sec with min/mod postural cuing   PTRx Ther Proc 8 Toe Raises   PTRx Ther Proc 8 - Details 20x   Therapeutic Activity   Therapeutic Activities: dynamic activities to improve functional performance minutes (73945) 10   Ther Act 1 knee bends   Ther Act 1 - Details 20x supported min cuing to sit backwards   PTRx Ther Act 1 Lateral Stepdown with Neutral Trunk Position   PTRx Ther Act 1 - Details 4\" x 20 side to side min support   Neuromuscular Re-education   Neuromuscular re-ed of mvmt, balance, coord, kinesthetic sense, posture, proprioception minutes (96036) 10   Neuro Re-ed 1 sport cord marching (blue) 4 direction in place   Neuro Re-ed 1 - Details 45sec each   PTRx Neuro Re-ed 1 Balance Single Leg Stance Supported and Unsupported   PTRx Neuro Re-ed 1 - Details 30sec x 2 B. progressions of airex and EC " 30sec x 1 each with min/mod support   PTRx Neuro Re-ed 2 Creep walking   PTRx Neuro Re-ed 2 - Details 2 passes F/B and side to side   Education   Learner/Method Patient;Demonstration;Pictures/Video   Education Comments online   Plan   Home program See PTRx   Plan for next session cont x 1 visit then D/C with HEP   Total Session Time   Timed Code Treatment Minutes 40   Total Treatment Time (sum of timed and untimed services) 40

## 2024-04-02 ENCOUNTER — PATIENT OUTREACH (OUTPATIENT)
Dept: CARE COORDINATION | Facility: CLINIC | Age: 24
End: 2024-04-02
Payer: COMMERCIAL

## 2024-04-11 ENCOUNTER — TELEPHONE (OUTPATIENT)
Dept: FAMILY MEDICINE | Facility: CLINIC | Age: 24
End: 2024-04-11
Payer: COMMERCIAL

## 2024-04-11 NOTE — TELEPHONE ENCOUNTER
Pt states needs to do asthma control, and chronic pain, wants to do physical therapy or something else , would like to be seen asap please advise [phone]

## 2024-04-16 ENCOUNTER — PATIENT OUTREACH (OUTPATIENT)
Dept: CARE COORDINATION | Facility: CLINIC | Age: 24
End: 2024-04-16
Payer: COMMERCIAL

## 2024-04-19 ENCOUNTER — OFFICE VISIT (OUTPATIENT)
Dept: FAMILY MEDICINE | Facility: CLINIC | Age: 24
End: 2024-04-19
Payer: COMMERCIAL

## 2024-04-19 VITALS
HEIGHT: 67 IN | HEART RATE: 103 BPM | BODY MASS INDEX: 26.37 KG/M2 | TEMPERATURE: 98.1 F | SYSTOLIC BLOOD PRESSURE: 124 MMHG | RESPIRATION RATE: 13 BRPM | DIASTOLIC BLOOD PRESSURE: 83 MMHG | WEIGHT: 168 LBS | OXYGEN SATURATION: 100 %

## 2024-04-19 DIAGNOSIS — F60.3 BORDERLINE PERSONALITY DISORDER (H): ICD-10-CM

## 2024-04-19 DIAGNOSIS — M54.41 CHRONIC BILATERAL LOW BACK PAIN WITH RIGHT-SIDED SCIATICA: Primary | ICD-10-CM

## 2024-04-19 DIAGNOSIS — N94.6 DYSMENORRHEA: ICD-10-CM

## 2024-04-19 DIAGNOSIS — R29.898 RIGHT LEG WEAKNESS: ICD-10-CM

## 2024-04-19 DIAGNOSIS — J45.40 MODERATE PERSISTENT ASTHMA WITHOUT COMPLICATION: ICD-10-CM

## 2024-04-19 DIAGNOSIS — G89.29 CHRONIC BILATERAL LOW BACK PAIN WITH RIGHT-SIDED SCIATICA: Primary | ICD-10-CM

## 2024-04-19 DIAGNOSIS — F39 MOOD DISORDER (H): ICD-10-CM

## 2024-04-19 DIAGNOSIS — F12.20 CANNABIS DEPENDENCE (H): ICD-10-CM

## 2024-04-19 DIAGNOSIS — F90.9 ATTENTION DEFICIT HYPERACTIVITY DISORDER (ADHD), UNSPECIFIED ADHD TYPE: ICD-10-CM

## 2024-04-19 PROCEDURE — 99214 OFFICE O/P EST MOD 30 MIN: CPT | Performed by: FAMILY MEDICINE

## 2024-04-19 RX ORDER — TIZANIDINE 2 MG/1
2-4 TABLET ORAL 3 TIMES DAILY PRN
Qty: 30 TABLET | Refills: 0 | Status: SHIPPED | OUTPATIENT
Start: 2024-04-19

## 2024-04-19 RX ORDER — FLUTICASONE PROPIONATE AND SALMETEROL 500; 50 UG/1; UG/1
1 POWDER RESPIRATORY (INHALATION) EVERY 12 HOURS
Qty: 60 EACH | Refills: 5 | Status: SHIPPED | OUTPATIENT
Start: 2024-04-19 | End: 2024-04-20

## 2024-04-19 RX ORDER — METHYLPHENIDATE 1.1 MG/H
1 PATCH TRANSDERMAL DAILY
Qty: 30 PATCH | Refills: 0 | Status: SHIPPED | OUTPATIENT
Start: 2024-04-19 | End: 2024-04-20

## 2024-04-19 ASSESSMENT — ASTHMA QUESTIONNAIRES
QUESTION_4 LAST FOUR WEEKS HOW OFTEN HAVE YOU USED YOUR RESCUE INHALER OR NEBULIZER MEDICATION (SUCH AS ALBUTEROL): TWO OR THREE TIMES PER WEEK
QUESTION_1 LAST FOUR WEEKS HOW MUCH OF THE TIME DID YOUR ASTHMA KEEP YOU FROM GETTING AS MUCH DONE AT WORK, SCHOOL OR AT HOME: A LITTLE OF THE TIME
ACT_TOTALSCORE: 18
ACT_TOTALSCORE: 18
QUESTION_5 LAST FOUR WEEKS HOW WOULD YOU RATE YOUR ASTHMA CONTROL: WELL CONTROLLED
QUESTION_2 LAST FOUR WEEKS HOW OFTEN HAVE YOU HAD SHORTNESS OF BREATH: THREE TO SIX TIMES A WEEK
QUESTION_3 LAST FOUR WEEKS HOW OFTEN DID YOUR ASTHMA SYMPTOMS (WHEEZING, COUGHING, SHORTNESS OF BREATH, CHEST TIGHTNESS OR PAIN) WAKE YOU UP AT NIGHT OR EARLIER THAN USUAL IN THE MORNING: ONCE OR TWICE

## 2024-04-19 ASSESSMENT — PATIENT HEALTH QUESTIONNAIRE - PHQ9
SUM OF ALL RESPONSES TO PHQ QUESTIONS 1-9: 4
SUM OF ALL RESPONSES TO PHQ QUESTIONS 1-9: 4
10. IF YOU CHECKED OFF ANY PROBLEMS, HOW DIFFICULT HAVE THESE PROBLEMS MADE IT FOR YOU TO DO YOUR WORK, TAKE CARE OF THINGS AT HOME, OR GET ALONG WITH OTHER PEOPLE: SOMEWHAT DIFFICULT

## 2024-04-19 NOTE — LETTER
My Asthma Action Plan    Name: Kailey Argueta   YOB: 2000  Date: 4/19/2024   My doctor: Zohra Garcia MD   My clinic: [facility]        My Control Medicine: Fluticasone propionate + salmeterol (Advair Diskus or Wixela Inhub) -  500/50 mcg twice per day  My Rescue Medicine: Albuterol (Proair/Ventolin/Proventil HFA) 2-4 puffs EVERY 4 HOURS as needed. Use a spacer if recommended by your provider.   My Asthma Severity:   Moderate Persistent  Know your asthma triggers: pollens and exercise or sports  weather changing            GREEN ZONE   Good Control  I feel good  No cough or wheeze  Can work, sleep and play without asthma symptoms       Take your asthma control medicine every day.     If exercise triggers your asthma, take your rescue medication  15 minutes before exercise or sports, and  During exercise if you have asthma symptoms  Spacer to use with inhaler: If you have a spacer, make sure to use it with your inhaler             YELLOW ZONE Getting Worse  I have ANY of these:  I do not feel good  Cough or wheeze  Chest feels tight  Wake up at night   Keep taking your Green Zone medications  Start taking your rescue medicine:  every 20 minutes for up to 1 hour. Then every 4 hours for 24-48 hours.  If you stay in the Yellow Zone for more than 12-24 hours, contact your doctor.  If you do not return to the Green Zone in 12-24 hours or you get worse, start taking your oral steroid medicine if prescribed by your provider.           RED ZONE Medical Alert - Get Help  I have ANY of these:  I feel awful  Medicine is not helping  Breathing getting harder  Trouble walking or talking  Nose opens wide to breathe       Take your rescue medicine NOW  If your provider has prescribed an oral steroid medicine, start taking it NOW  Call your doctor NOW  If you are still in the Red Zone after 20 minutes and you have not reached your doctor:  Take your rescue medicine again and  Call 911  or go to the emergency room right away    See your regular doctor within 2 weeks of an Emergency Room or Urgent Care visit for follow-up treatment.          Annual Reminders:  Meet with Asthma Educator,  Flu Shot in the Fall, consider Pneumonia Vaccination for patients with asthma (aged 19 and older).    Pharmacy: [id number] [address]    Electronically signed by Zohra Garcia MD   Date: 04/19/24                      Asthma Triggers  How To Control Things That Make Your Asthma Worse    Triggers are things that make your asthma worse.  Look at the list below to help you find your triggers and what you can do about them.  You can help prevent asthma flare-ups by staying away from your triggers.      Trigger                                                          What you can do   Cigarette Smoke  Tobacco smoke can make asthma worse. Do not allow smoking in your home, car or around you.  Be sure no one smokes at a child s day care or school.  If you smoke, ask your health care provider for ways to help you quit.  Ask family members to quit too.  Ask your health care provider for a referral to Quit Plan to help you quit smoking, or call 9-111-363-PLAN.     Colds, Flu, Bronchitis  These are common triggers of asthma. Wash your hands often.  Don t touch your eyes, nose or mouth.  Get a flu shot every year.     Dust Mites  These are tiny bugs that live in cloth or carpet. They are too small to see. Wash sheets and blankets in hot water every week.   Encase pillows and mattress in dust mite proof covers.  Avoid having carpet if you can. If you have carpet, vacuum weekly.   Use a dust mask and HEPA vacuum.   Pollen and Outdoor Mold  Some people are allergic to trees, grass, or weed pollen, or molds. Try to keep your windows closed.  Limit time out doors when pollen count is high.   Ask you health care provider about taking medicine during allergy season.     Animal Dander  Some  people are allergic to skin flakes, urine or saliva from pets with fur or feathers. Keep pets with fur or feathers out of your home.    If you can t keep the pet outdoors, then keep the pet out of your bedroom.  Keep the bedroom door closed.  Keep pets off cloth furniture and away from stuffed toys.     Mice, Rats, and Cockroaches   Some people are allergic to the waste from these pests.   Cover food and garbage.  Clean up spills and food crumbs.  Store grease in the refrigerator.   Keep food out of the bedroom.   Indoor Mold  This can be a trigger if your home has high moisture. Fix leaking faucets, pipes, or other sources of water.   Clean moldy surfaces.  Dehumidify basement if it is damp and smelly.   Smoke, Strong Odors, and Sprays  These can reduce air quality. Stay away from strong odors and sprays, such as perfume, powder, hair spray, paints, smoke incense, paint, cleaning products, candles and new carpet.   Exercise or Sports  Some people with asthma have this trigger. Be active!  Ask your doctor about taking medicine before sports or exercise to prevent symptoms.    Warm up for 5-10 minutes before and after sports or exercise.     Other Triggers of Asthma  Cold air:  Cover your nose and mouth with a scarf.  Sometimes laughing or crying can be a trigger.  Some medicines and food can trigger asthma.

## 2024-04-19 NOTE — PROGRESS NOTES
"  Assessment & Plan     Chronic bilateral low back pain with right-sided sciatica  Will see if we can get another MRI authorized, but may need to consider XR first.  She has already done PT and it is no longer helpful.  Her previous MRI did show a bulging disc touching S1 nerve root so it is possible things have worsened.  Trial of muscle relaxer for PRN use.  - tiZANidine (ZANAFLEX) 2 MG tablet; Take 1-2 tablets (2-4 mg) by mouth 3 times daily as needed for muscle spasms  - MR Lumbar Spine w/o Contrast; Future    Right leg weakness  As above  - MR Lumbar Spine w/o Contrast; Future    Dysmenorrhea  Trial of muscle relaxer for PRN use.    - tiZANidine (ZANAFLEX) 2 MG tablet; Take 1-2 tablets (2-4 mg) by mouth 3 times daily as needed for muscle spasms    Moderate persistent asthma without complication  Controlled overall, having a slight flare with the allergens right now.  Follow up if not improving in the next few weeks.  - fluticasone-salmeterol (WIXELA INHUB) 500-50 MCG/ACT inhaler; Inhale 1 puff into the lungs every 12 hours  - Asthma Action Plan (AAP)    Attention deficit hyperactivity disorder (ADHD), unspecified ADHD type  Continue, woring well.  - methylphenidate (DAYTRANA) 10 MG/9HR patch; Place 1 patch onto the skin daily wear patch for 9 hours only each day    Cannabis dependence (H)  Continues daily use, advised cessation would likely help her lungs.    Borderline personality disorder (H)  Continue therapy.    Mood disorder (H24)  Continue therapy, follow up if medication desired.      24 minutes spent by me on the date of the encounter doing chart review, history and exam, documentation and further activities per the note      BMI  Estimated body mass index is 26.31 kg/m  as calculated from the following:    Height as of this encounter: 1.702 m (5' 7\").    Weight as of this encounter: 76.2 kg (168 lb).       See Patient Instructions    Madonna Fang is a 24 year old, presenting for the following " health issues:  Asthma and Back Pain        4/19/2024     3:47 PM   Additional Questions   Roomed by Jesus TROY CMA     History of Present Illness     Asthma:  Annalisa presents for follow up of asthma.  Annalisa has some cough, no wheezing, and some shortness of breath.  Annalisa is using a relief medication a few times a week. Annalisa typically misses taking Annalisa's controller medication 1 time(s) per week. Patient is aware of the following triggers: cold air, exercise or sports and strong odors and fumes. The patient has not had a visit to the Emergency Room, Urgent Care or Hospital due to asthma since the last clinic visit.     Back Pain:  Annalisa presents for follow up of back pain. Patient's back pain is a chronic problem.  Location of back pain:  Right lower back, left lower back and right shoulder  Description of back pain: cramping, dull ache and sharp  Back pain spreads: right buttocks, right thigh, right knee and right foot    Since patient first noticed back pain, pain is: gradually worsening  Does back pain interfere with Annalisa's job:  Yes       Reason for visit:  Asthma, pain, adhd possibly but the first 2 for sure    Annalisa eats 0-1 servings of fruits and vegetables daily.Annalisa consumes 2 sweetened beverage(s) daily.Annalisa exercises with enough effort to increase Annalisa's heart rate 10 to 19 minutes per day.  Annalisa exercises with enough effort to increase Annalisa's heart rate 3 or less days per week. Annalisa is missing 2 dose(s) of medications per week.  Annalisa is not taking prescribed medications regularly due to remembering to take.     The back pain is getting worse, she has been doing PT at home.  She had had a couple falls, where her right knee will either seem to give out or not work.  Fell out of bed one time when she was trying to get up and out.  Had an MRI in 2022 that showed slight disc bulge.  If she is sitting too long, she will start to get tingling down the right leg, sometimes will go numb.  She had paraesthesias in the  "past.      During her period, her back pain is debilitating.  She used a Tylenol containing a muscle relaxer from [employer], an OTC product, but not sure what it was.     Needs inhalers refilled, asthma has been a little rough this past few weeks.  She is having to go up and down stairs a lot now, trying not to use her albuterol.    She has started therapy again, which she feels is helpful.  She stopped the Seroquel because it made her anxiety worse.     ADHD is controlled on the patch when needed.    Current Outpatient Medications   Medication Sig Dispense Refill    acetaminophen (TYLENOL) 500 MG tablet Take 500-1,000 mg by mouth every 6 hours as needed for mild pain      albuterol (PROAIR HFA/PROVENTIL HFA/VENTOLIN HFA) 108 (90 Base) MCG/ACT inhaler INHALE 1-2 PUFFS BY MOUTH EVERY 4 HOURS AS NEEDED FOR SHORTNESS OF BREATH / DYSPNEA OR WHEEZING 18 g 3    diclofenac (VOLTAREN) 1 % topical gel Apply 4 g topically 4 times daily as needed for moderate pain 350 g 0    fluticasone-salmeterol (WIXELA INHUB) 500-50 MCG/ACT inhaler Inhale 1 puff into the lungs every 12 hours 60 each 5    hydrOXYzine (VISTARIL) 50 MG capsule Take 50 mg by mouth      methylphenidate (DAYTRANA) 10 MG/9HR patch Place 1 patch onto the skin daily wear patch for 9 hours only each day 30 patch 0    naproxen (NAPROSYN) 500 MG tablet TAKE 1 TABLET (500 MG) BY MOUTH 2 TIMES DAILY (WITH MEALS) AS NEEDED FOR PAIN 60 tablet 1    tiZANidine (ZANAFLEX) 2 MG tablet Take 1-2 tablets (2-4 mg) by mouth 3 times daily as needed for muscle spasms 30 tablet 0    VALERIAN ROOT PO                Objective    /83 (BP Location: Right arm, Patient Position: Sitting, Cuff Size: Adult Regular)   Pulse 103   Temp 98.1  F (36.7  C) (Oral)   Resp 13   Ht 1.702 m (5' 7\")   Wt 76.2 kg (168 lb)   LMP 04/11/2024 (Exact Date)   SpO2 100%   BMI 26.31 kg/m    Body mass index is 26.31 kg/m .  Physical Exam   GENERAL: alert and no distress  RESP: lungs clear to " auscultation - no rales, rhonchi or wheezes  CV: regular rates and rhythm  PSYCH: mentation appears normal, affect normal/bright          Signed Electronically by: Zohra Garcia MD

## 2024-04-20 ENCOUNTER — MYC REFILL (OUTPATIENT)
Dept: FAMILY MEDICINE | Facility: CLINIC | Age: 24
End: 2024-04-20
Payer: COMMERCIAL

## 2024-04-20 DIAGNOSIS — F90.9 ATTENTION DEFICIT HYPERACTIVITY DISORDER (ADHD), UNSPECIFIED ADHD TYPE: ICD-10-CM

## 2024-04-20 DIAGNOSIS — J45.40 MODERATE PERSISTENT ASTHMA WITHOUT COMPLICATION: ICD-10-CM

## 2024-04-23 RX ORDER — METHYLPHENIDATE 1.1 MG/H
1 PATCH TRANSDERMAL DAILY
Qty: 30 PATCH | Refills: 0 | Status: SHIPPED | OUTPATIENT
Start: 2024-04-23

## 2024-04-23 RX ORDER — FLUTICASONE PROPIONATE AND SALMETEROL 500; 50 UG/1; UG/1
1 POWDER RESPIRATORY (INHALATION) EVERY 12 HOURS
Qty: 60 EACH | Refills: 5 | Status: SHIPPED | OUTPATIENT
Start: 2024-04-23 | End: 2024-07-22

## 2024-04-24 ENCOUNTER — TELEPHONE (OUTPATIENT)
Dept: FAMILY MEDICINE | Facility: CLINIC | Age: 24
End: 2024-04-24
Payer: COMMERCIAL

## 2024-04-24 NOTE — TELEPHONE ENCOUNTER
PA team     Please complete PA on below medication     (DAYTRANA) 10 MG/9HR patch     Thank you   Bailee Cortez, Registered Nurse  [facility]

## 2024-04-24 NOTE — TELEPHONE ENCOUNTER
PA started in separate encounter and sent to the PA team to process     Bailee Cortez Registered Nurse  [facility]

## 2024-05-08 NOTE — TELEPHONE ENCOUNTER
PA Initiation    Medication: [facility]PH[facility]IDATE 10 [facility]  Insurance Company: [employer] ([facility]) - Phone [phone] Fax [phone]  Pharmacy Filling the Rx: [id number] - [place], [place] - [address]  Filling Pharmacy Phone: [phone]  Filling Pharmacy Fax:    Start Date: 5/8/2024

## 2024-05-10 NOTE — TELEPHONE ENCOUNTER
Prior Authorization Approval    Medication: METHYLPHENIDATE 10 MG/9HR TD [facility]  Authorization Effective Date: 5/8/2024  Authorization Expiration Date: 5/8/2025  Approved Dose/Quantity: 30/30  Reference #: [id number]   Insurance Company: [employer] ([facility]) - Phone [phone] Fax [phone]  Expected CoPay: $    CoPay Card Available:      Financial Assistance Needed:   Which Pharmacy is filling the prescription: [facility]/PHARMACY #[id number] - [place], [place] - [address]  Pharmacy Notified: Yes  Patient Notified: Yes

## 2024-06-29 ENCOUNTER — HEALTH MAINTENANCE LETTER (OUTPATIENT)
Age: 24
End: 2024-06-29

## 2024-07-17 ENCOUNTER — PATIENT OUTREACH (OUTPATIENT)
Dept: FAMILY MEDICINE | Facility: CLINIC | Age: 24
End: 2024-07-17
Payer: COMMERCIAL

## 2024-07-17 DIAGNOSIS — R87.610 ATYPICAL SQUAMOUS CELLS OF UNDETERMINED SIGNIFICANCE (ASCUS) ON PAPANICOLAOU SMEAR OF CERVIX: Primary | ICD-10-CM

## 2024-07-31 ENCOUNTER — TELEPHONE (OUTPATIENT)
Dept: FAMILY MEDICINE | Facility: CLINIC | Age: 24
End: 2024-07-31
Payer: COMMERCIAL

## 2024-07-31 NOTE — TELEPHONE ENCOUNTER
Dr. Kim Garcia   Please see message below regarding MRI     Jose Clinton April Johanna, MD  Cc: P [facility] PA Denial Notification    Patient Name: Kailey Argueta  : 2000  MRN: [id number]    Dr. Alvarez,    The authorization for procedure MR LUMBAR SPINE W/O CONTRAST  on date of service 2024 has been denied by the patient's insurance for the following reason:    Denial Reason: Your healthcare provider told us that you are having lower back pain that  travels to your hip and/or leg. The request cannot be approved because:    A study similar to the one requested has recently been performed. The results of that study showed your doctor what they needed to see in order to treat your problem. No additional imaging is needed at this time.    Your doctor must have noted results of a detailed exam that included your nervous system that support imaging. This exam must have been performed after your symptoms started or changed. Your nervous system is a network of nerve cells and fibers that send nerve messages between parts of your body.    Imaging requires six weeks of provider directed treatment to be completed. Supported treatments include (but are not limited to) drugs for swelling or pain, an in office workout (physical therapy), and/or oral or injected steroids. This must have been completed in the past three months without improved symptoms. Contact (via office visit, phone, email, or messaging) must occur after the treatment is completed. This has not been met because:    You have not completed six weeks of provider directed treatment.    The provider directed treatment did not occur within the last three months.    This finding was based on review of [facility] Spine Imaging Guidelines Section(s): Lower Extremity Pain with Neurological Features (Radiculopathy, Radiculitis, or Plexopathy and Neuropathy) with or without Low Back (Lumbar Spine) Pain (SP  6.1) and 1.0 General Guidelines    Below is the information we provided to the patient's insurance company:    Clinicals Provided  Order:                          Yes  Visit Notes:                  OV 4/19/24, 08/02/23, PT 11/15/23, 11/1/23, 10/11/23, 9/20/23, 8/23/23, 8/16/23  Results:                       07/31/2022    A [id number] review can be done by calling the insurance/third party authorization vendor with the following information:    Insurance: [facility]  Auth Vendor: [facility] "[employer], [place]"  Phone #: [phone]; opt 1; opt 3; Case # [id number]  Due Date: 08/06/2024    Patient ID: [id number]  Case/Ref #: [id number]    An appeal can be filed for possible decision reversal if you do not wish to proceed with the peer to peer. This can take up to 30 days for the insurance to process once submitted. If you wish to proceed with an appeal, please provide additional clinical records and a letter explaining why you feel this service is medically necessary.    Please let us know how you wish to proceed as soon as possible. We will be informing the patient in regards to the denial by 08/06/2024 if we do not receive a response.    Thank you,    Jose Leach Prior Authorization

## 2024-07-31 NOTE — TELEPHONE ENCOUNTER
Please call patient and let her know that her insurance will not cover the MRI, that she needs to have PT before.  I would recommend that she see orthopedics also to discuss her back pain.

## 2024-08-01 DIAGNOSIS — R29.898 RIGHT LEG WEAKNESS: ICD-10-CM

## 2024-08-01 DIAGNOSIS — G89.29 CHRONIC BILATERAL LOW BACK PAIN WITH RIGHT-SIDED SCIATICA: Primary | ICD-10-CM

## 2024-08-01 DIAGNOSIS — M54.41 CHRONIC BILATERAL LOW BACK PAIN WITH RIGHT-SIDED SCIATICA: Primary | ICD-10-CM

## 2024-08-10 ENCOUNTER — NURSE TRIAGE (OUTPATIENT)
Dept: NURSING | Facility: CLINIC | Age: 24
End: 2024-08-10
Payer: COMMERCIAL

## 2024-08-10 NOTE — TELEPHONE ENCOUNTER
"Pt reports \"severe difficulty breathing.\"  Accompanied by \"severe R-sided chest pain.\"  Onset upon waking today.  Reports this has happened before; apparently pleurisy in the past.    Pt declines calling 911 due to cost.  Has partner and parents with her who can transport to hospital.  States preference for [facility].  Explained family may call 911 enroute if needed.  Pt verbalizes understanding.    Sol Huitron RN  [facility] Nurse Advisor     Reason for Disposition   SEVERE difficulty breathing (e.g., struggling for each breath, speaks in single words)    Protocols used: Chest Pain-A-AH    "

## 2024-08-17 DIAGNOSIS — F41.0 PANIC ATTACK: ICD-10-CM

## 2024-08-17 NOTE — LETTER
August 23, 2024      Kailey Argueta  [address] [address]      Dear Kailey,    We recently received a call from your pharmacy requesting a refill of your medication.    A review of your chart indicates that an appointment is required with your provider.  Please call the clinic to schedule your appointment.    We have authorized one refill of your medication to allow time for you to schedule.   If you have a history of diabetes or high cholesterol, please come in fasting for the appointment. Fasting entails nothing to eat or drink 8 hours prior to your appointment; with the exception on water. You may take your medication the day of the appointment.    Thank you,      April Coming MD Jose

## 2024-08-21 RX ORDER — HYDROXYZINE PAMOATE 50 MG/1
50 CAPSULE ORAL 3 TIMES DAILY PRN
Qty: 270 CAPSULE | Refills: 0 | Status: SHIPPED | OUTPATIENT
Start: 2024-08-21

## 2024-08-21 NOTE — TELEPHONE ENCOUNTER
Kailey refill provided. Please help patient schedule follow up visit to continue.     JESICA Haile CNP (for Dr. SMITH)

## 2024-08-22 DIAGNOSIS — R06.2 WHEEZING: ICD-10-CM

## 2024-08-22 DIAGNOSIS — J45.40 MODERATE PERSISTENT ASTHMA WITHOUT COMPLICATION: ICD-10-CM

## 2024-08-23 RX ORDER — FLUTICASONE PROPIONATE AND SALMETEROL 50; 500 UG/1; UG/1
1 POWDER RESPIRATORY (INHALATION) EVERY 12 HOURS
Qty: 60 EACH | Refills: 0 | Status: SHIPPED | OUTPATIENT
Start: 2024-08-23

## 2024-08-23 RX ORDER — ALBUTEROL SULFATE 90 UG/1
AEROSOL, METERED RESPIRATORY (INHALATION)
Qty: 18 G | Refills: 0 | Status: SHIPPED | OUTPATIENT
Start: 2024-08-23

## 2024-09-17 NOTE — TELEPHONE ENCOUNTER
Dr. Kim Garcia,    Patient is lost to pap tracking follow-up. Attempts to contact pt have been made per reminder process and there has been no reply and/or no appt scheduled.      Zoila Jay, RN  Cervical Cancer Resulting RN  (CCR-RN)     7/28/20 ASCUS pap  8/2/23 NIL Pap, 24 yo. Plan pap in 1 year.   7/17/24 Reminder mychart  8/13/24 Reminder call -- left message  9/17/2024 Lost to follow-up for pap tracking

## 2024-09-18 ENCOUNTER — TELEPHONE (OUTPATIENT)
Dept: FAMILY MEDICINE | Facility: CLINIC | Age: 24
End: 2024-09-18
Payer: COMMERCIAL

## 2024-09-18 NOTE — TELEPHONE ENCOUNTER
Patient Quality Outreach    Patient is due for the following:   Asthma  -  Asthma follow-up visit  Cervical Cancer Screening - PAP Needed  Physical Preventive Adult Physical      Topic Date Due    Hepatitis B Vaccine (2 of 3 - 19+ 3-dose series) 08/30/2023    Flu Vaccine (1) 09/01/2024    COVID-19 Vaccine (4 - 2024-25 season) 09/01/2024       Next Steps:   Schedule a office visit for asthma follow up Adult Preventative    Type of outreach:    Sent [employer] message.    Next Steps:  Reach out within 90 days via Phone.    Max number of attempts reached: No. Will try again in 90 days if patient still on fail list.    Questions for provider review:    None           Agustina Carmichael MA        
done

## 2024-10-29 ENCOUNTER — PATIENT OUTREACH (OUTPATIENT)
Dept: CARE COORDINATION | Facility: CLINIC | Age: 24
End: 2024-10-29
Payer: COMMERCIAL

## 2024-11-16 DIAGNOSIS — N94.6 DYSMENORRHEA: ICD-10-CM

## 2024-11-16 DIAGNOSIS — M54.41 CHRONIC BILATERAL LOW BACK PAIN WITH RIGHT-SIDED SCIATICA: ICD-10-CM

## 2024-11-16 DIAGNOSIS — G89.29 CHRONIC BILATERAL LOW BACK PAIN WITH RIGHT-SIDED SCIATICA: ICD-10-CM

## 2024-11-18 RX ORDER — TIZANIDINE 2 MG/1
2-4 TABLET ORAL 3 TIMES DAILY PRN
Qty: 30 TABLET | Refills: 0 | Status: SHIPPED | OUTPATIENT
Start: 2024-11-18

## 2024-12-12 ENCOUNTER — TELEPHONE (OUTPATIENT)
Dept: FAMILY MEDICINE | Facility: CLINIC | Age: 24
End: 2024-12-12
Payer: COMMERCIAL

## 2024-12-12 NOTE — TELEPHONE ENCOUNTER
Order/Referral Request    Who is requesting: patient    Insurance has denied MRI imaging unless patient goes through PT beforehand. Patient has already tried PT in the past, with little to no improvement.    Patient would like for provider to appeal the decision of the insurance company, if at all possible, so that she might get the MRI to find out what, if any, has changed with the herniated disc.    Has this been discussed with Provider: Yes    Could we send this information to you in [employer] or would you prefer to receive a phone call?:   Patient would prefer a phone call   Okay to leave a detailed message?: Yes at Cell number on file:    Telephone Information:   Mobile [phone]

## 2025-01-13 ENCOUNTER — MYC MEDICAL ADVICE (OUTPATIENT)
Dept: FAMILY MEDICINE | Facility: CLINIC | Age: 25
End: 2025-01-13

## 2025-01-13 NOTE — TELEPHONE ENCOUNTER
Patient Quality Outreach    Patient is due for the following:   Asthma  -  ACT needed    Action(s) Taken:   Patient was assigned appropriate questionnaire to complete    Type of outreach:    Sent [employer] message.    Questions for provider review:    None           Maritza Aguero CMA

## 2025-06-02 ENCOUNTER — ANCILLARY PROCEDURE (OUTPATIENT)
Dept: GENERAL RADIOLOGY | Facility: CLINIC | Age: 25
End: 2025-06-02
Attending: PHYSICIAN ASSISTANT
Payer: COMMERCIAL

## 2025-06-02 ENCOUNTER — OFFICE VISIT (OUTPATIENT)
Dept: FAMILY MEDICINE | Facility: CLINIC | Age: 25
End: 2025-06-02
Payer: COMMERCIAL

## 2025-06-02 VITALS
TEMPERATURE: 97.2 F | RESPIRATION RATE: 20 BRPM | SYSTOLIC BLOOD PRESSURE: 134 MMHG | OXYGEN SATURATION: 98 % | BODY MASS INDEX: 30.7 KG/M2 | WEIGHT: 195.6 LBS | DIASTOLIC BLOOD PRESSURE: 77 MMHG | HEART RATE: 84 BPM | HEIGHT: 67 IN

## 2025-06-02 DIAGNOSIS — F33.0 MILD EPISODE OF RECURRENT MAJOR DEPRESSIVE DISORDER: ICD-10-CM

## 2025-06-02 DIAGNOSIS — G89.29 CHRONIC BILATERAL LOW BACK PAIN WITH RIGHT-SIDED SCIATICA: Primary | ICD-10-CM

## 2025-06-02 DIAGNOSIS — M54.41 CHRONIC BILATERAL LOW BACK PAIN WITH RIGHT-SIDED SCIATICA: ICD-10-CM

## 2025-06-02 DIAGNOSIS — M54.41 CHRONIC BILATERAL LOW BACK PAIN WITH RIGHT-SIDED SCIATICA: Primary | ICD-10-CM

## 2025-06-02 DIAGNOSIS — G89.29 CHRONIC BILATERAL LOW BACK PAIN WITH RIGHT-SIDED SCIATICA: ICD-10-CM

## 2025-06-02 DIAGNOSIS — Z02.9 ADMINISTRATIVE ENCOUNTER: ICD-10-CM

## 2025-06-02 DIAGNOSIS — J45.40 MODERATE PERSISTENT ASTHMA WITHOUT COMPLICATION: ICD-10-CM

## 2025-06-02 DIAGNOSIS — M35.7 HYPERMOBILITY SYNDROME: ICD-10-CM

## 2025-06-02 PROCEDURE — 72100 X-RAY EXAM L-S SPINE 2/3 VWS: CPT | Mod: TC | Performed by: STUDENT IN AN ORGANIZED HEALTH CARE EDUCATION/TRAINING PROGRAM

## 2025-06-02 PROCEDURE — 96127 BRIEF EMOTIONAL/BEHAV ASSMT: CPT | Performed by: PHYSICIAN ASSISTANT

## 2025-06-02 PROCEDURE — 99214 OFFICE O/P EST MOD 30 MIN: CPT | Performed by: PHYSICIAN ASSISTANT

## 2025-06-02 RX ORDER — FLUTICASONE PROPIONATE AND SALMETEROL 500; 50 UG/1; UG/1
1 POWDER RESPIRATORY (INHALATION) EVERY 12 HOURS
Qty: 60 EACH | Refills: 5 | Status: SHIPPED | OUTPATIENT
Start: 2025-06-02

## 2025-06-02 ASSESSMENT — ASTHMA QUESTIONNAIRES
QUESTION_5 LAST FOUR WEEKS HOW WOULD YOU RATE YOUR ASTHMA CONTROL: SOMEWHAT CONTROLLED
ACT_TOTALSCORE: 12
QUESTION_2 LAST FOUR WEEKS HOW OFTEN HAVE YOU HAD SHORTNESS OF BREATH: MORE THAN ONCE A DAY
QUESTION_1 LAST FOUR WEEKS HOW MUCH OF THE TIME DID YOUR ASTHMA KEEP YOU FROM GETTING AS MUCH DONE AT WORK, SCHOOL OR AT HOME: SOME OF THE TIME
QUESTION_3 LAST FOUR WEEKS HOW OFTEN DID YOUR ASTHMA SYMPTOMS (WHEEZING, COUGHING, SHORTNESS OF BREATH, CHEST TIGHTNESS OR PAIN) WAKE YOU UP AT NIGHT OR EARLIER THAN USUAL IN THE MORNING: TWO OR THREE NIGHTS A WEEK
QUESTION_4 LAST FOUR WEEKS HOW OFTEN HAVE YOU USED YOUR RESCUE INHALER OR NEBULIZER MEDICATION (SUCH AS ALBUTEROL): TWO OR THREE TIMES PER WEEK

## 2025-06-02 ASSESSMENT — PATIENT HEALTH QUESTIONNAIRE - PHQ9
SUM OF ALL RESPONSES TO PHQ QUESTIONS 1-9: 15
SUM OF ALL RESPONSES TO PHQ QUESTIONS 1-9: 15
10. IF YOU CHECKED OFF ANY PROBLEMS, HOW DIFFICULT HAVE THESE PROBLEMS MADE IT FOR YOU TO DO YOUR WORK, TAKE CARE OF THINGS AT HOME, OR GET ALONG WITH OTHER PEOPLE: VERY DIFFICULT

## 2025-06-02 NOTE — PROGRESS NOTES
"  Assessment & Plan     Chronic bilateral low back pain with right-sided sciatica  On going pain. Was seen a year ago and at that time was doing physical therapy without improvement. MRI ordered as she has history of herniated disk but it was declined by insurance. Still need imaging as pain is no better. X-ray obtained today and MRI ordered again.  She has days when the pain is worse and working can be difficult. She reports it has been hard to find a job that allows her to use accommodations.  - MR Lumbar Spine w/o Contrast; Future  - XR Lumbar Spine 2/3 Views; Future    Moderate persistent asthma without complication  Stable. Refills provided.  - fluticasone-salmeterol (ADVAIR DISKUS) 500-50 MCG/ACT inhaler; Inhale 1 puff into the lungs every 12 hours.    Hypermobility syndrome  Likely contributes to her pain noted above.    Mild episode of recurrent major depressive disorder  Has been in therapy in the past. Reports therapist \"ghosted\". No safety concerns or thoughts of self harm or suicide.    Administrative encounter   Paperwork for [employer] completed today. For now, no working until imaging has been obtained.    Depression Screening Follow Up        6/2/2025     7:26 AM   PHQ   PHQ-9 Total Score 15    Q9: Thoughts of better off dead/self-harm past 2 weeks Several days   F/U: Thoughts of suicide or self-harm No   F/U: Safety concerns No       Patient-reported       Follow Up Actions Taken  Crisis resource information provided in the After Visit Summary      Subjective   Annalisa is a 25 year old, presenting for the following health issues:  Forms (Sn[employer] forms )        6/2/2025     8:42 AM   Additional Questions   Roomed by Prachi   Accompanied by Tj - Partner         6/2/2025   Forms   Any forms needing to be completed Yes     History of Present Illness       Reason for visit:  [employer] Medical Opinion Annalisa is missing 3 dose(s) of medications per week.  Annalisa is not taking prescribed medications regularly due to remembering " "to take.        Patient is a 25 year old female who presents today for follow up regarding mental health and chronic back pain. She has history of herniated disk L5/S1 and pain into the right leg. She has previously seen PT but it was not helping. Last imaging was in 2022 with MRI.  She would like paperwork completed so she is able to get food stamps as she has a difficult time keeping a job due to her mental health and her chronic back pain.        Objective    /77 (BP Location: Left arm, Patient Position: Sitting, Cuff Size: Adult Regular)   Pulse 84   Temp 97.2  F (36.2  C) (Temporal)   Resp 20   Ht 1.702 m (5' 7\")   Wt 88.7 kg (195 lb 9.6 oz)   LMP 05/26/2025   SpO2 98%   BMI 30.64 kg/m    Body mass index is 30.64 kg/m .  Physical Exam   GENERAL: No acute distress  HEENT: Normocephalic  NEURO: Alert and non-focal  PSYCH: Tearful at times          Signed Electronically by: Shahrzad Miranda PA-C    "

## 2025-06-03 ENCOUNTER — RESULTS FOLLOW-UP (OUTPATIENT)
Dept: FAMILY MEDICINE | Facility: CLINIC | Age: 25
End: 2025-06-03

## 2025-06-03 DIAGNOSIS — M54.41 CHRONIC BILATERAL LOW BACK PAIN WITH RIGHT-SIDED SCIATICA: Primary | ICD-10-CM

## 2025-06-03 DIAGNOSIS — M35.7 HYPERMOBILITY SYNDROME: ICD-10-CM

## 2025-06-03 DIAGNOSIS — G89.29 CHRONIC BILATERAL LOW BACK PAIN WITH RIGHT-SIDED SCIATICA: Primary | ICD-10-CM

## 2025-06-25 ENCOUNTER — NURSE TRIAGE (OUTPATIENT)
Dept: FAMILY MEDICINE | Facility: CLINIC | Age: 25
End: 2025-06-25
Payer: COMMERCIAL

## 2025-06-25 NOTE — TELEPHONE ENCOUNTER
Pt calls.    She is having a hard time breathing and she can't find her inhalers.  She has been moving a lot and she thinks that is what is triggering it.      She is looking for her albuterol inhaler.    She is crying.  She is SOB.  She was wheezing earlier.  She says she is light headed and has cough.  She woke with this and feels it is constant.  She is ranking the severity of SOB high moderate.  She has chest pain with every breath, feels like someone sitting on her chest.  She has a runny nose.      Per protocol, advised to go to the ER now.  Advised to have someone else drive her.  Call 911 if needed.        Reason for Disposition   MODERATE difficulty breathing (e.g., speaks in phrases, SOB even at rest, pulse 100-120) of new-onset or worse than normal    Additional Information   Negative: SEVERE difficulty breathing (e.g., struggling for each breath, speaks in single words, pulse > 120)   Negative: Breathing stopped and hasn't returned   Negative: Choking on something   Negative: Bluish (or gray) lips or face   Negative: Difficult to awaken or acting confused (e.g., disoriented, slurred speech)   Negative: Passed out (e.g., fainted, lost consciousness, blacked out and was not responding)   Negative: Wheezing started suddenly after medicine, an allergic food, or bee sting   Negative: Stridor (harsh sound while breathing in)   Negative: Slow, shallow and weak breathing   Negative: Sounds like a life-threatening emergency to the triager   Negative: Chest pain   Negative: Wheezing (high pitched whistling sound) and previous asthma attacks or use of asthma medicines   Negative: Breathing difficulty and within 14 days of COVID-19 EXPOSURE (close contact) with someone diagnosed with COVID-19 (e.g., COVID test positive)   Negative: Breathing difficulty and COVID-19 is widespread in the community   Negative: Breathing diffculty and only present when coughing   Negative: Breathing difficulty and only from stuffy  "nose   Negative: Breathing diffculty and only from stuffy nose or runny nose from common cold    Answer Assessment - Initial Assessment Questions  1. RESPIRATORY STATUS: \"Describe your breathing?\" (e.g., wheezing, shortness of breath, unable to speak, severe coughing)       Wheezing, SOB, cough  2. ONSET: \"When did this breathing problem begin?\"       This started 15 minutes ago   3. PATTERN \"Does the difficult breathing come and go, or has it been constant since it started?\"       She just woke up and it feels really bad and it feels constant   4. SEVERITY: \"How bad is your breathing?\" (e.g., mild, moderate, severe)       High moderate   5. RECURRENT SYMPTOM: \"Have you had difficulty breathing before?\" If Yes, ask: \"When was the last time?\" and \"What happened that time?\"       Yes, but not this bad   6. CARDIAC HISTORY: \"Do you have any history of heart disease?\" (e.g., heart attack, angina, bypass surgery, angioplasty)       No   7. LUNG HISTORY: \"Do you have any history of lung disease?\"  (e.g., pulmonary embolus, asthma, emphysema)      asthma  8. CAUSE: \"What do you think is causing the breathing problem?\"       Normally if she would take her inhaler she would be ok.  The inhaler usually tones this down to be able to breathe again   9. OTHER SYMPTOMS: \"Do you have any other symptoms?\" (e.g., chest pain, cough, dizziness, fever, runny nose)      Cough, chest pain with every breath - feels like something sitting on top it, light headedness, runny nose   10. O2 SATURATION MONITOR:  \"Do you use an oxygen saturation monitor (pulse oximeter) at home?\" If Yes, ask: \"What is your reading (oxygen level) today?\" \"What is your usual oxygen saturation reading?\" (e.g., 95%)        Unsure   11. PREGNANCY: \"Is there any chance you are pregnant?\" \"When was your last menstrual period?\"        No   12. TRAVEL: \"Have you traveled out of the country in the last month?\" (e.g., travel history, exposures)        no    Protocols used: " Breathing Difficulty-A-[place]

## 2025-07-29 ENCOUNTER — TELEPHONE (OUTPATIENT)
Dept: FAMILY MEDICINE | Facility: CLINIC | Age: 25
End: 2025-07-29
Payer: COMMERCIAL

## 2025-07-29 NOTE — TELEPHONE ENCOUNTER
Patient Quality Outreach    Patient is due for the following:   Asthma  -  ACT needed    Action(s) Taken:   Patient has upcoming appointment, these items will be addressed at that time.    Type of outreach:    Chart review performed, no outreach needed.    Questions for provider review:    None         Maritza Guallpa, [facility]  Chart routed to None.

## 2025-07-30 ENCOUNTER — HOSPITAL ENCOUNTER (OUTPATIENT)
Dept: MRI IMAGING | Facility: CLINIC | Age: 25
Discharge: HOME | End: 2025-07-30
Attending: PHYSICIAN ASSISTANT
Payer: COMMERCIAL

## 2025-07-30 DIAGNOSIS — M54.41 CHRONIC BILATERAL LOW BACK PAIN WITH RIGHT-SIDED SCIATICA: ICD-10-CM

## 2025-07-30 DIAGNOSIS — G89.29 CHRONIC BILATERAL LOW BACK PAIN WITH RIGHT-SIDED SCIATICA: ICD-10-CM

## 2025-07-30 PROCEDURE — 72148 MRI LUMBAR SPINE W/O DYE: CPT

## 2025-08-04 ENCOUNTER — PATIENT OUTREACH (OUTPATIENT)
Dept: CARE COORDINATION | Facility: CLINIC | Age: 25
End: 2025-08-04
Payer: COMMERCIAL

## 2025-08-20 ENCOUNTER — PRE VISIT (OUTPATIENT)
Dept: NEUROSURGERY | Facility: CLINIC | Age: 25
End: 2025-08-20

## 2025-08-20 ENCOUNTER — OFFICE VISIT (OUTPATIENT)
Dept: NEUROSURGERY | Facility: CLINIC | Age: 25
End: 2025-08-20
Attending: PHYSICIAN ASSISTANT
Payer: COMMERCIAL

## 2025-08-20 VITALS — OXYGEN SATURATION: 97 % | SYSTOLIC BLOOD PRESSURE: 141 MMHG | HEART RATE: 84 BPM | DIASTOLIC BLOOD PRESSURE: 85 MMHG

## 2025-08-20 DIAGNOSIS — M54.41 CHRONIC BILATERAL LOW BACK PAIN WITH RIGHT-SIDED SCIATICA: ICD-10-CM

## 2025-08-20 DIAGNOSIS — M35.7 HYPERMOBILITY SYNDROME: ICD-10-CM

## 2025-08-20 DIAGNOSIS — G89.29 CHRONIC BILATERAL LOW BACK PAIN WITH RIGHT-SIDED SCIATICA: ICD-10-CM

## 2025-08-20 ASSESSMENT — PAIN SCALES - GENERAL: PAINLEVEL_OUTOF10: MODERATE PAIN (6)

## 2025-09-03 ENCOUNTER — PATIENT OUTREACH (OUTPATIENT)
Dept: CARE COORDINATION | Facility: CLINIC | Age: 25
End: 2025-09-03

## 2025-09-03 ENCOUNTER — HOSPITAL ENCOUNTER (EMERGENCY)
Facility: CLINIC | Age: 25
Discharge: HOME | End: 2025-09-03
Payer: COMMERCIAL

## 2025-09-03 VITALS
OXYGEN SATURATION: 100 % | HEART RATE: 82 BPM | SYSTOLIC BLOOD PRESSURE: 145 MMHG | DIASTOLIC BLOOD PRESSURE: 84 MMHG | TEMPERATURE: 98.6 F | RESPIRATION RATE: 26 BRPM

## 2025-09-03 DIAGNOSIS — J98.4 CALCIFIED GRANULOMA OF LUNG: ICD-10-CM

## 2025-09-03 DIAGNOSIS — J45.901 ACUTE ASTHMA EXACERBATION: Primary | ICD-10-CM

## 2025-09-03 LAB
ANION GAP SERPL CALCULATED.3IONS-SCNC: 11 MMOL/L (ref 7–15)
ATRIAL RATE - MUSE: 81 BPM
BUN SERPL-MCNC: 8.7 MG/DL (ref 6–20)
CALCIUM SERPL-MCNC: 9 MG/DL (ref 8.8–10.4)
CHLORIDE SERPL-SCNC: 108 MMOL/L (ref 98–107)
CREAT SERPL-MCNC: 0.73 MG/DL (ref 0.51–1.17)
DIASTOLIC BLOOD PRESSURE - MUSE: NORMAL MMHG
EGFRCR SERPLBLD CKD-EPI 2021: >90 ML/MIN/1.73M2
ERYTHROCYTE [DISTWIDTH] IN BLOOD BY AUTOMATED COUNT: 12.3 % (ref 10–15)
GLUCOSE SERPL-MCNC: 98 MG/DL (ref 70–99)
HCO3 BLDV-SCNC: 23 MMOL/L (ref 21–28)
HCO3 SERPL-SCNC: 22 MMOL/L (ref 22–29)
HCT VFR BLD AUTO: 44.6 % (ref 35–53)
HGB BLD-MCNC: 15.4 G/DL (ref 11.7–17.7)
INTERPRETATION ECG - MUSE: NORMAL
LACTATE BLD-SCNC: 0.8 MMOL/L (ref 0.7–2)
MCH RBC QN AUTO: 30.2 PG (ref 26.5–33)
MCHC RBC AUTO-ENTMCNC: 34.5 G/DL (ref 31.5–36.5)
MCV RBC AUTO: 87.5 FL (ref 78–100)
P AXIS - MUSE: 49 DEGREES
PCO2 BLDV: 39 MM HG (ref 40–50)
PH BLDV: 7.38 [PH] (ref 7.32–7.43)
PLATELET # BLD AUTO: 254 10E3/UL (ref 150–450)
PO2 BLDV: 24 MM HG (ref 25–47)
POTASSIUM SERPL-SCNC: 3.9 MMOL/L (ref 3.4–5.3)
PR INTERVAL - MUSE: 134 MS
QRS DURATION - MUSE: 78 MS
QT - MUSE: 380 MS
QTC - MUSE: 441 MS
R AXIS - MUSE: 17 DEGREES
RBC # BLD AUTO: 5.1 10E6/UL (ref 3.8–5.9)
SAO2 % BLDV: 41 % (ref 70–75)
SODIUM SERPL-SCNC: 141 MMOL/L (ref 135–145)
SYSTOLIC BLOOD PRESSURE - MUSE: NORMAL MMHG
T AXIS - MUSE: -11 DEGREES
VENTRICULAR RATE- MUSE: 81 BPM
WBC # BLD AUTO: 6.07 10E3/UL (ref 4–11)

## 2025-09-03 PROCEDURE — 99285 EMERGENCY DEPT VISIT HI MDM: CPT | Mod: 25

## 2025-09-03 PROCEDURE — 36415 COLL VENOUS BLD VENIPUNCTURE: CPT

## 2025-09-03 PROCEDURE — 250N000012 HC RX MED GY IP 250 OP 636 PS 637

## 2025-09-03 PROCEDURE — 94664 DEMO&/EVAL PT USE INHALER: CPT

## 2025-09-03 PROCEDURE — 83605 ASSAY OF LACTIC ACID: CPT

## 2025-09-03 PROCEDURE — 80048 BASIC METABOLIC PNL TOTAL CA: CPT

## 2025-09-03 PROCEDURE — 258N000003 HC RX IP 258 OP 636

## 2025-09-03 PROCEDURE — 250N000011 HC RX IP 250 OP 636

## 2025-09-03 PROCEDURE — 85041 AUTOMATED RBC COUNT: CPT

## 2025-09-03 PROCEDURE — 250N000009 HC RX 250

## 2025-09-03 RX ORDER — MAGNESIUM SULFATE HEPTAHYDRATE 40 MG/ML
2 INJECTION, SOLUTION INTRAVENOUS ONCE
Status: DISCONTINUED | OUTPATIENT
Start: 2025-09-03 | End: 2025-09-03

## 2025-09-03 RX ORDER — PREDNISONE 20 MG/1
60 TABLET ORAL ONCE
Status: COMPLETED | OUTPATIENT
Start: 2025-09-03 | End: 2025-09-03

## 2025-09-03 RX ORDER — MAGNESIUM SULFATE HEPTAHYDRATE 40 MG/ML
2 INJECTION, SOLUTION INTRAVENOUS ONCE
Status: COMPLETED | OUTPATIENT
Start: 2025-09-03 | End: 2025-09-03

## 2025-09-03 RX ORDER — IPRATROPIUM BROMIDE AND ALBUTEROL SULFATE 2.5; .5 MG/3ML; MG/3ML
3 SOLUTION RESPIRATORY (INHALATION)
Status: COMPLETED | OUTPATIENT
Start: 2025-09-03 | End: 2025-09-03

## 2025-09-03 RX ORDER — ALBUTEROL SULFATE 0.83 MG/ML
2.5 SOLUTION RESPIRATORY (INHALATION) EVERY 4 HOURS PRN
Qty: 75 ML | Refills: 0 | Status: SHIPPED | OUTPATIENT
Start: 2025-09-03

## 2025-09-03 RX ORDER — PREDNISONE 20 MG/1
60 TABLET ORAL DAILY
Qty: 12 TABLET | Refills: 0 | Status: SHIPPED | OUTPATIENT
Start: 2025-09-04 | End: 2025-09-08

## 2025-09-03 RX ADMIN — IPRATROPIUM BROMIDE AND ALBUTEROL SULFATE 3 ML: .5; 3 SOLUTION RESPIRATORY (INHALATION) at 12:42

## 2025-09-03 RX ADMIN — SODIUM CHLORIDE 1000 ML: 0.9 INJECTION, SOLUTION INTRAVENOUS at 12:43

## 2025-09-03 RX ADMIN — IPRATROPIUM BROMIDE AND ALBUTEROL SULFATE 3 ML: .5; 3 SOLUTION RESPIRATORY (INHALATION) at 14:08

## 2025-09-03 RX ADMIN — PREDNISONE 60 MG: 20 TABLET ORAL at 12:39

## 2025-09-03 RX ADMIN — IPRATROPIUM BROMIDE AND ALBUTEROL SULFATE 3 ML: .5; 3 SOLUTION RESPIRATORY (INHALATION) at 14:07

## 2025-09-03 RX ADMIN — MAGNESIUM SULFATE HEPTAHYDRATE 2 G: 40 INJECTION, SOLUTION INTRAVENOUS at 13:54

## 2025-09-03 ASSESSMENT — COLUMBIA-SUICIDE SEVERITY RATING SCALE - C-SSRS
6. HAVE YOU EVER DONE ANYTHING, STARTED TO DO ANYTHING, OR PREPARED TO DO ANYTHING TO END YOUR LIFE?: NO
2. HAVE YOU ACTUALLY HAD ANY THOUGHTS OF KILLING YOURSELF IN THE PAST MONTH?: NO
1. IN THE PAST MONTH, HAVE YOU WISHED YOU WERE DEAD OR WISHED YOU COULD GO TO SLEEP AND NOT WAKE UP?: NO

## 2025-09-03 ASSESSMENT — ACTIVITIES OF DAILY LIVING (ADL)
ADLS_ACUITY_SCORE: 41